# Patient Record
Sex: MALE | Race: WHITE | Employment: OTHER | URBAN - METROPOLITAN AREA
[De-identification: names, ages, dates, MRNs, and addresses within clinical notes are randomized per-mention and may not be internally consistent; named-entity substitution may affect disease eponyms.]

---

## 2017-01-23 ENCOUNTER — OFFICE VISIT (OUTPATIENT)
Dept: INTERNAL MEDICINE CLINIC | Age: 72
End: 2017-01-23

## 2017-01-23 VITALS
WEIGHT: 229 LBS | BODY MASS INDEX: 32.06 KG/M2 | SYSTOLIC BLOOD PRESSURE: 110 MMHG | HEART RATE: 70 BPM | RESPIRATION RATE: 18 BRPM | DIASTOLIC BLOOD PRESSURE: 75 MMHG | HEIGHT: 71 IN

## 2017-01-23 DIAGNOSIS — R07.89 OTHER CHEST PAIN: ICD-10-CM

## 2017-01-23 DIAGNOSIS — I10 ESSENTIAL HYPERTENSION: ICD-10-CM

## 2017-01-23 DIAGNOSIS — I25.10 CORONARY ARTERY DISEASE INVOLVING NATIVE CORONARY ARTERY OF NATIVE HEART WITHOUT ANGINA PECTORIS: ICD-10-CM

## 2017-01-23 DIAGNOSIS — E11.8 TYPE 2 DIABETES MELLITUS WITH COMPLICATION, WITHOUT LONG-TERM CURRENT USE OF INSULIN (HCC): Primary | ICD-10-CM

## 2017-01-23 DIAGNOSIS — E78.2 MIXED HYPERLIPIDEMIA: ICD-10-CM

## 2017-01-23 DIAGNOSIS — E11.8 TYPE 2 DIABETES MELLITUS WITH COMPLICATION, WITHOUT LONG-TERM CURRENT USE OF INSULIN (HCC): ICD-10-CM

## 2017-01-23 DIAGNOSIS — E11.29 TYPE 2 DIABETES MELLITUS WITH MICROALBUMINURIA, WITHOUT LONG-TERM CURRENT USE OF INSULIN (HCC): ICD-10-CM

## 2017-01-23 DIAGNOSIS — R80.9 TYPE 2 DIABETES MELLITUS WITH MICROALBUMINURIA, WITHOUT LONG-TERM CURRENT USE OF INSULIN (HCC): ICD-10-CM

## 2017-01-23 LAB
CHOLESTEROL, TOTAL: 161 MG/DL (ref 0–199)
HDLC SERPL-MCNC: 30 MG/DL (ref 40–60)
LDL CHOLESTEROL CALCULATED: 86 MG/DL
TRIGL SERPL-MCNC: 226 MG/DL (ref 0–150)
VLDLC SERPL CALC-MCNC: 45 MG/DL

## 2017-01-23 PROCEDURE — 99214 OFFICE O/P EST MOD 30 MIN: CPT | Performed by: INTERNAL MEDICINE

## 2017-01-23 PROCEDURE — 3017F COLORECTAL CA SCREEN DOC REV: CPT | Performed by: INTERNAL MEDICINE

## 2017-01-23 PROCEDURE — G8484 FLU IMMUNIZE NO ADMIN: HCPCS | Performed by: INTERNAL MEDICINE

## 2017-01-23 PROCEDURE — 1123F ACP DISCUSS/DSCN MKR DOCD: CPT | Performed by: INTERNAL MEDICINE

## 2017-01-23 PROCEDURE — G8598 ASA/ANTIPLAT THER USED: HCPCS | Performed by: INTERNAL MEDICINE

## 2017-01-23 PROCEDURE — 3045F PR MOST RECENT HEMOGLOBIN A1C LEVEL 7.0-9.0%: CPT | Performed by: INTERNAL MEDICINE

## 2017-01-23 PROCEDURE — G8419 CALC BMI OUT NRM PARAM NOF/U: HCPCS | Performed by: INTERNAL MEDICINE

## 2017-01-23 PROCEDURE — G8427 DOCREV CUR MEDS BY ELIG CLIN: HCPCS | Performed by: INTERNAL MEDICINE

## 2017-01-23 PROCEDURE — 4040F PNEUMOC VAC/ADMIN/RCVD: CPT | Performed by: INTERNAL MEDICINE

## 2017-01-23 PROCEDURE — 4004F PT TOBACCO SCREEN RCVD TLK: CPT | Performed by: INTERNAL MEDICINE

## 2017-01-23 RX ORDER — MELOXICAM 15 MG/1
15 TABLET ORAL DAILY
Qty: 90 TABLET | Refills: 0 | Status: SHIPPED | OUTPATIENT
Start: 2017-01-23 | End: 2017-08-15

## 2017-01-24 LAB
ESTIMATED AVERAGE GLUCOSE: 151.3 MG/DL
HBA1C MFR BLD: 6.9 %

## 2017-01-30 ENCOUNTER — TELEPHONE (OUTPATIENT)
Dept: INTERNAL MEDICINE CLINIC | Age: 72
End: 2017-01-30

## 2017-01-30 RX ORDER — DOXYCYCLINE HYCLATE 100 MG
100 TABLET ORAL 2 TIMES DAILY
Qty: 10 TABLET | Refills: 0 | Status: SHIPPED | OUTPATIENT
Start: 2017-01-30 | End: 2017-02-14 | Stop reason: ALTCHOICE

## 2017-02-04 PROBLEM — I50.9 ACUTE CONGESTIVE HEART FAILURE (HCC): Status: ACTIVE | Noted: 2017-02-04

## 2017-02-05 PROBLEM — I71.40 ABDOMINAL AORTIC ANEURYSM (AAA) WITHOUT RUPTURE: Status: ACTIVE | Noted: 2017-02-05

## 2017-02-07 ENCOUNTER — CARE COORDINATION (OUTPATIENT)
Dept: CARE COORDINATION | Age: 72
End: 2017-02-07

## 2017-02-07 ENCOUNTER — TELEPHONE (OUTPATIENT)
Dept: INTERNAL MEDICINE CLINIC | Age: 72
End: 2017-02-07

## 2017-02-14 ENCOUNTER — HOSPITAL ENCOUNTER (OUTPATIENT)
Dept: NUCLEAR MEDICINE | Age: 72
Discharge: HOME | End: 2017-02-14
Attending: INTERNAL MEDICINE | Admitting: INTERNAL MEDICINE

## 2017-02-14 ENCOUNTER — TELEPHONE (OUTPATIENT)
Dept: INTERNAL MEDICINE CLINIC | Age: 72
End: 2017-02-14

## 2017-02-14 VITALS
HEART RATE: 71 BPM | RESPIRATION RATE: 18 BRPM | DIASTOLIC BLOOD PRESSURE: 89 MMHG | TEMPERATURE: 98.1 F | SYSTOLIC BLOOD PRESSURE: 167 MMHG

## 2017-02-14 DIAGNOSIS — R07.89 OTHER CHEST PAIN: ICD-10-CM

## 2017-02-14 DIAGNOSIS — I10 ESSENTIAL HYPERTENSION: Primary | ICD-10-CM

## 2017-02-14 LAB
LV EF: 30 %
LVEF MODALITY: NORMAL

## 2017-02-14 RX ORDER — FUROSEMIDE 40 MG/1
40 TABLET ORAL DAILY
Qty: 30 TABLET | Refills: 0 | Status: SHIPPED | OUTPATIENT
Start: 2017-02-14 | End: 2017-03-11 | Stop reason: SDUPTHER

## 2017-02-14 RX ORDER — POTASSIUM CHLORIDE 750 MG/1
10 TABLET, FILM COATED, EXTENDED RELEASE ORAL DAILY
Qty: 30 TABLET | Refills: 0 | Status: SHIPPED | OUTPATIENT
Start: 2017-02-14 | End: 2017-03-13 | Stop reason: SDUPTHER

## 2017-02-14 ASSESSMENT — PAIN DESCRIPTION - DESCRIPTORS: DESCRIPTORS: DISCOMFORT

## 2017-02-14 ASSESSMENT — PAIN - FUNCTIONAL ASSESSMENT: PAIN_FUNCTIONAL_ASSESSMENT: 0-10

## 2017-02-21 ENCOUNTER — HOSPITAL ENCOUNTER (OUTPATIENT)
Dept: OTHER | Age: 72
Discharge: HOME | End: 2017-02-21
Attending: INTERNAL MEDICINE | Admitting: INTERNAL MEDICINE

## 2017-02-21 DIAGNOSIS — N40.0 ENLARGED PROSTATE: Primary | ICD-10-CM

## 2017-02-21 LAB
ANION GAP SERPL CALCULATED.3IONS-SCNC: 15 MMOL/L (ref 3–16)
BUN BLDV-MCNC: 10 MG/DL (ref 7–20)
CALCIUM SERPL-MCNC: 9.3 MG/DL (ref 8.3–10.6)
CHLORIDE BLD-SCNC: 97 MMOL/L (ref 99–110)
CO2: 29 MMOL/L (ref 21–32)
CREAT SERPL-MCNC: 0.9 MG/DL (ref 0.8–1.3)
GFR AFRICAN AMERICAN: >60
GFR NON-AFRICAN AMERICAN: >60
GLUCOSE BLD-MCNC: 145 MG/DL (ref 70–99)
POTASSIUM SERPL-SCNC: 4 MMOL/L (ref 3.5–5.1)
SODIUM BLD-SCNC: 141 MMOL/L (ref 136–145)

## 2017-02-27 ENCOUNTER — TELEPHONE (OUTPATIENT)
Dept: INTERNAL MEDICINE CLINIC | Age: 72
End: 2017-02-27

## 2017-02-28 ENCOUNTER — TELEPHONE (OUTPATIENT)
Dept: INTERNAL MEDICINE CLINIC | Age: 72
End: 2017-02-28

## 2017-03-13 RX ORDER — FUROSEMIDE 40 MG/1
TABLET ORAL
Qty: 30 TABLET | Refills: 0 | Status: SHIPPED | OUTPATIENT
Start: 2017-03-13 | End: 2017-04-11 | Stop reason: SDUPTHER

## 2017-03-13 RX ORDER — POTASSIUM CHLORIDE 750 MG/1
TABLET, FILM COATED, EXTENDED RELEASE ORAL
Qty: 30 TABLET | Refills: 0 | Status: SHIPPED | OUTPATIENT
Start: 2017-03-13 | End: 2017-03-31 | Stop reason: SDUPTHER

## 2017-03-17 RX ORDER — ZOLPIDEM TARTRATE 10 MG/1
TABLET ORAL
Qty: 90 TABLET | Refills: 0 | Status: SHIPPED | OUTPATIENT
Start: 2017-03-17 | End: 2017-06-12 | Stop reason: SDUPTHER

## 2017-03-31 RX ORDER — POTASSIUM CHLORIDE 750 MG/1
TABLET, FILM COATED, EXTENDED RELEASE ORAL
Qty: 30 TABLET | Refills: 0 | Status: SHIPPED | OUTPATIENT
Start: 2017-03-31 | End: 2017-05-13

## 2017-04-03 RX ORDER — METOPROLOL SUCCINATE 50 MG/1
TABLET, EXTENDED RELEASE ORAL
Qty: 180 TABLET | Refills: 0 | Status: SHIPPED | OUTPATIENT
Start: 2017-04-03 | End: 2017-07-03 | Stop reason: SDUPTHER

## 2017-04-12 RX ORDER — POTASSIUM CHLORIDE 750 MG/1
TABLET, FILM COATED, EXTENDED RELEASE ORAL
Qty: 30 TABLET | Refills: 0 | Status: SHIPPED | OUTPATIENT
Start: 2017-04-12 | End: 2017-05-13 | Stop reason: SDUPTHER

## 2017-04-12 RX ORDER — FUROSEMIDE 40 MG/1
TABLET ORAL
Qty: 30 TABLET | Refills: 0 | Status: SHIPPED | OUTPATIENT
Start: 2017-04-12 | End: 2017-05-13 | Stop reason: SDUPTHER

## 2017-05-10 ENCOUNTER — HOSPITAL ENCOUNTER (OUTPATIENT)
Dept: OTHER | Age: 72
Discharge: HOME | End: 2017-05-10
Attending: INTERNAL MEDICINE | Admitting: INTERNAL MEDICINE

## 2017-05-10 ENCOUNTER — OFFICE VISIT (OUTPATIENT)
Dept: INTERNAL MEDICINE CLINIC | Age: 72
End: 2017-05-10

## 2017-05-10 VITALS
SYSTOLIC BLOOD PRESSURE: 140 MMHG | RESPIRATION RATE: 18 BRPM | DIASTOLIC BLOOD PRESSURE: 75 MMHG | WEIGHT: 228 LBS | BODY MASS INDEX: 31.92 KG/M2 | HEIGHT: 71 IN | HEART RATE: 75 BPM

## 2017-05-10 DIAGNOSIS — R30.0 DYSURIA: Primary | ICD-10-CM

## 2017-05-10 DIAGNOSIS — I42.9 CARDIOMYOPATHY (HCC): ICD-10-CM

## 2017-05-10 DIAGNOSIS — R10.9 LEFT FLANK PAIN: ICD-10-CM

## 2017-05-10 DIAGNOSIS — R30.0 DYSURIA: ICD-10-CM

## 2017-05-10 DIAGNOSIS — I50.22 CHF NYHA CLASS II, CHRONIC, SYSTOLIC (HCC): ICD-10-CM

## 2017-05-10 LAB
BILIRUBIN, POC: NORMAL
BLOOD URINE, POC: NORMAL
CLARITY, POC: NORMAL
COLOR, POC: NORMAL
GLUCOSE URINE, POC: NORMAL
KETONES, POC: NORMAL
LEUKOCYTE EST, POC: NORMAL
NITRITE, POC: NORMAL
PH, POC: NORMAL
PROTEIN, POC: NORMAL
SPECIFIC GRAVITY, POC: NORMAL
UROBILINOGEN, POC: NORMAL

## 2017-05-10 PROCEDURE — G8417 CALC BMI ABV UP PARAM F/U: HCPCS | Performed by: INTERNAL MEDICINE

## 2017-05-10 PROCEDURE — 4004F PT TOBACCO SCREEN RCVD TLK: CPT | Performed by: INTERNAL MEDICINE

## 2017-05-10 PROCEDURE — 1123F ACP DISCUSS/DSCN MKR DOCD: CPT | Performed by: INTERNAL MEDICINE

## 2017-05-10 PROCEDURE — 99213 OFFICE O/P EST LOW 20 MIN: CPT | Performed by: INTERNAL MEDICINE

## 2017-05-10 PROCEDURE — G8598 ASA/ANTIPLAT THER USED: HCPCS | Performed by: INTERNAL MEDICINE

## 2017-05-10 PROCEDURE — 81002 URINALYSIS NONAUTO W/O SCOPE: CPT | Performed by: INTERNAL MEDICINE

## 2017-05-10 PROCEDURE — 4040F PNEUMOC VAC/ADMIN/RCVD: CPT | Performed by: INTERNAL MEDICINE

## 2017-05-10 PROCEDURE — G8427 DOCREV CUR MEDS BY ELIG CLIN: HCPCS | Performed by: INTERNAL MEDICINE

## 2017-05-10 PROCEDURE — 3017F COLORECTAL CA SCREEN DOC REV: CPT | Performed by: INTERNAL MEDICINE

## 2017-05-10 RX ORDER — VALACYCLOVIR HYDROCHLORIDE 500 MG/1
500 TABLET, FILM COATED ORAL 2 TIMES DAILY
Qty: 10 TABLET | Refills: 0 | Status: SHIPPED | OUTPATIENT
Start: 2017-05-10 | End: 2017-05-15

## 2017-05-10 RX ORDER — HYDROCODONE BITARTRATE AND ACETAMINOPHEN 5; 325 MG/1; MG/1
1 TABLET ORAL EVERY 6 HOURS PRN
Qty: 15 TABLET | Refills: 0 | Status: SHIPPED | OUTPATIENT
Start: 2017-05-10 | End: 2017-05-15 | Stop reason: SDUPTHER

## 2017-05-10 RX ORDER — CYCLOBENZAPRINE HCL 5 MG
5 TABLET ORAL EVERY 8 HOURS PRN
Qty: 30 TABLET | Refills: 0 | Status: SHIPPED | OUTPATIENT
Start: 2017-05-10 | End: 2017-05-24 | Stop reason: SDUPTHER

## 2017-05-12 LAB — URINE CULTURE, ROUTINE: NORMAL

## 2017-05-13 RX ORDER — POTASSIUM CHLORIDE 750 MG/1
10 TABLET, FILM COATED, EXTENDED RELEASE ORAL DAILY
Qty: 30 TABLET | Refills: 0 | Status: SHIPPED | OUTPATIENT
Start: 2017-05-13 | End: 2017-08-15 | Stop reason: SDUPTHER

## 2017-05-13 RX ORDER — FUROSEMIDE 40 MG/1
40 TABLET ORAL DAILY
Qty: 30 TABLET | Refills: 0 | Status: SHIPPED | OUTPATIENT
Start: 2017-05-13 | End: 2017-06-08 | Stop reason: SDUPTHER

## 2017-05-15 RX ORDER — HYDROCODONE BITARTRATE AND ACETAMINOPHEN 5; 325 MG/1; MG/1
1 TABLET ORAL EVERY 6 HOURS PRN
Qty: 15 TABLET | Refills: 0 | Status: SHIPPED | OUTPATIENT
Start: 2017-05-15 | End: 2018-06-21 | Stop reason: SDUPTHER

## 2017-05-16 DIAGNOSIS — N20.0 KIDNEY STONE: Primary | ICD-10-CM

## 2017-05-16 RX ORDER — OXYCODONE HYDROCHLORIDE AND ACETAMINOPHEN 5; 325 MG/1; MG/1
1 TABLET ORAL EVERY 6 HOURS PRN
Qty: 25 TABLET | Refills: 0 | Status: SHIPPED | OUTPATIENT
Start: 2017-05-16 | End: 2017-05-25 | Stop reason: SDUPTHER

## 2017-05-16 RX ORDER — CITALOPRAM 20 MG/1
TABLET ORAL
Qty: 90 TABLET | Refills: 0 | Status: SHIPPED | OUTPATIENT
Start: 2017-05-16 | End: 2017-08-15 | Stop reason: SDUPTHER

## 2017-05-17 ENCOUNTER — HOSPITAL ENCOUNTER (OUTPATIENT)
Dept: NON INVASIVE DIAGNOSTICS | Age: 72
Discharge: HOME | End: 2017-05-17
Attending: INTERNAL MEDICINE | Admitting: INTERNAL MEDICINE

## 2017-05-17 DIAGNOSIS — I42.9 CARDIOMYOPATHY (HCC): ICD-10-CM

## 2017-05-17 LAB
LV EF: 43 %
LVEF MODALITY: NORMAL

## 2017-05-18 ENCOUNTER — HOSPITAL ENCOUNTER (OUTPATIENT)
Dept: CT IMAGING | Age: 72
Discharge: HOME | End: 2017-05-18

## 2017-05-18 DIAGNOSIS — N20.0 KIDNEY STONE: ICD-10-CM

## 2017-05-19 RX ORDER — AMOXICILLIN 250 MG
1 CAPSULE ORAL 2 TIMES DAILY
Qty: 30 TABLET | Refills: 0 | Status: SHIPPED | OUTPATIENT
Start: 2017-05-19 | End: 2017-08-15

## 2017-05-22 ENCOUNTER — TELEPHONE (OUTPATIENT)
Dept: CARDIOLOGY CLINIC | Age: 72
End: 2017-05-22

## 2017-05-24 ENCOUNTER — OFFICE VISIT (OUTPATIENT)
Dept: CARDIOLOGY CLINIC | Age: 72
End: 2017-05-24

## 2017-05-24 VITALS
DIASTOLIC BLOOD PRESSURE: 72 MMHG | HEIGHT: 70 IN | BODY MASS INDEX: 32.64 KG/M2 | SYSTOLIC BLOOD PRESSURE: 126 MMHG | HEART RATE: 80 BPM | WEIGHT: 228 LBS | OXYGEN SATURATION: 96 %

## 2017-05-24 DIAGNOSIS — I25.10 CORONARY ARTERY DISEASE INVOLVING NATIVE CORONARY ARTERY OF NATIVE HEART WITHOUT ANGINA PECTORIS: Primary | ICD-10-CM

## 2017-05-24 DIAGNOSIS — E78.2 MIXED HYPERLIPIDEMIA: ICD-10-CM

## 2017-05-24 PROCEDURE — 99214 OFFICE O/P EST MOD 30 MIN: CPT | Performed by: INTERNAL MEDICINE

## 2017-05-24 PROCEDURE — G8417 CALC BMI ABV UP PARAM F/U: HCPCS | Performed by: INTERNAL MEDICINE

## 2017-05-24 PROCEDURE — G8427 DOCREV CUR MEDS BY ELIG CLIN: HCPCS | Performed by: INTERNAL MEDICINE

## 2017-05-24 PROCEDURE — 4040F PNEUMOC VAC/ADMIN/RCVD: CPT | Performed by: INTERNAL MEDICINE

## 2017-05-24 PROCEDURE — G8598 ASA/ANTIPLAT THER USED: HCPCS | Performed by: INTERNAL MEDICINE

## 2017-05-24 PROCEDURE — 4004F PT TOBACCO SCREEN RCVD TLK: CPT | Performed by: INTERNAL MEDICINE

## 2017-05-24 PROCEDURE — 1123F ACP DISCUSS/DSCN MKR DOCD: CPT | Performed by: INTERNAL MEDICINE

## 2017-05-24 PROCEDURE — 3017F COLORECTAL CA SCREEN DOC REV: CPT | Performed by: INTERNAL MEDICINE

## 2017-05-25 RX ORDER — OXYCODONE HYDROCHLORIDE AND ACETAMINOPHEN 5; 325 MG/1; MG/1
1 TABLET ORAL EVERY 6 HOURS PRN
Qty: 25 TABLET | Refills: 0 | Status: SHIPPED | OUTPATIENT
Start: 2017-05-25 | End: 2017-08-15

## 2017-06-08 RX ORDER — GLIMEPIRIDE 4 MG/1
TABLET ORAL
Qty: 90 TABLET | Refills: 0 | Status: SHIPPED | OUTPATIENT
Start: 2017-06-08 | End: 2017-09-08 | Stop reason: SDUPTHER

## 2017-06-08 RX ORDER — CYCLOBENZAPRINE HCL 5 MG
TABLET ORAL
Qty: 30 TABLET | Refills: 0 | Status: SHIPPED | OUTPATIENT
Start: 2017-06-08 | End: 2017-08-15

## 2017-06-13 RX ORDER — OMEPRAZOLE 20 MG/1
CAPSULE, DELAYED RELEASE ORAL
Qty: 90 CAPSULE | Refills: 0 | Status: SHIPPED | OUTPATIENT
Start: 2017-06-13 | End: 2017-09-08 | Stop reason: SDUPTHER

## 2017-06-13 RX ORDER — ZOLPIDEM TARTRATE 10 MG/1
TABLET ORAL
Qty: 90 TABLET | Refills: 0 | Status: SHIPPED | OUTPATIENT
Start: 2017-06-13 | End: 2017-09-01 | Stop reason: SDUPTHER

## 2017-06-26 RX ORDER — LISINOPRIL 40 MG/1
TABLET ORAL
Qty: 90 TABLET | Refills: 0 | Status: SHIPPED | OUTPATIENT
Start: 2017-06-26 | End: 2017-09-25 | Stop reason: SDUPTHER

## 2017-07-03 RX ORDER — PRAVASTATIN SODIUM 80 MG/1
TABLET ORAL
Qty: 90 TABLET | Refills: 0 | Status: SHIPPED | OUTPATIENT
Start: 2017-07-03 | End: 2017-10-03 | Stop reason: SDUPTHER

## 2017-07-03 RX ORDER — METOPROLOL SUCCINATE 50 MG/1
TABLET, EXTENDED RELEASE ORAL
Qty: 180 TABLET | Refills: 0 | Status: SHIPPED | OUTPATIENT
Start: 2017-07-03 | End: 2017-10-03 | Stop reason: SDUPTHER

## 2017-07-10 RX ORDER — POTASSIUM CHLORIDE 750 MG/1
TABLET, FILM COATED, EXTENDED RELEASE ORAL
Qty: 30 TABLET | Refills: 0 | Status: SHIPPED | OUTPATIENT
Start: 2017-07-10 | End: 2017-08-10 | Stop reason: SDUPTHER

## 2017-07-13 ENCOUNTER — TELEPHONE (OUTPATIENT)
Dept: INTERNAL MEDICINE CLINIC | Age: 72
End: 2017-07-13

## 2017-08-14 RX ORDER — POTASSIUM CHLORIDE 750 MG/1
TABLET, FILM COATED, EXTENDED RELEASE ORAL
Qty: 30 TABLET | Refills: 0 | Status: SHIPPED | OUTPATIENT
Start: 2017-08-14 | End: 2017-08-15 | Stop reason: SDUPTHER

## 2017-08-15 ENCOUNTER — OFFICE VISIT (OUTPATIENT)
Dept: INTERNAL MEDICINE CLINIC | Age: 72
End: 2017-08-15

## 2017-08-15 VITALS
SYSTOLIC BLOOD PRESSURE: 110 MMHG | RESPIRATION RATE: 18 BRPM | BODY MASS INDEX: 31.22 KG/M2 | HEIGHT: 71 IN | DIASTOLIC BLOOD PRESSURE: 75 MMHG | WEIGHT: 223 LBS | HEART RATE: 70 BPM

## 2017-08-15 DIAGNOSIS — E11.8 TYPE 2 DIABETES MELLITUS WITH COMPLICATION, WITHOUT LONG-TERM CURRENT USE OF INSULIN (HCC): ICD-10-CM

## 2017-08-15 DIAGNOSIS — N42.9 PROSTATE DISEASE: ICD-10-CM

## 2017-08-15 DIAGNOSIS — I10 ESSENTIAL HYPERTENSION: ICD-10-CM

## 2017-08-15 DIAGNOSIS — I71.40 ABDOMINAL AORTIC ANEURYSM (AAA) WITHOUT RUPTURE: ICD-10-CM

## 2017-08-15 DIAGNOSIS — I25.5 ISCHEMIC CARDIOMYOPATHY: ICD-10-CM

## 2017-08-15 DIAGNOSIS — Z12.11 COLON CANCER SCREENING: ICD-10-CM

## 2017-08-15 DIAGNOSIS — E78.2 MIXED HYPERLIPIDEMIA: ICD-10-CM

## 2017-08-15 DIAGNOSIS — I25.10 CORONARY ARTERY DISEASE INVOLVING NATIVE CORONARY ARTERY OF NATIVE HEART WITHOUT ANGINA PECTORIS: Primary | ICD-10-CM

## 2017-08-15 LAB
A/G RATIO: 2.5 (ref 1.1–2.2)
ALBUMIN SERPL-MCNC: 4.7 G/DL (ref 3.4–5)
ALP BLD-CCNC: 97 U/L (ref 40–129)
ALT SERPL-CCNC: 12 U/L (ref 10–40)
ANION GAP SERPL CALCULATED.3IONS-SCNC: 16 MMOL/L (ref 3–16)
AST SERPL-CCNC: 17 U/L (ref 15–37)
BASOPHILS ABSOLUTE: 0 K/UL (ref 0–0.2)
BASOPHILS RELATIVE PERCENT: 0.5 %
BILIRUB SERPL-MCNC: 1.1 MG/DL (ref 0–1)
BILIRUBIN, POC: NORMAL
BLOOD URINE, POC: NORMAL
BUN BLDV-MCNC: 10 MG/DL (ref 7–20)
CALCIUM SERPL-MCNC: 9.8 MG/DL (ref 8.3–10.6)
CHLORIDE BLD-SCNC: 97 MMOL/L (ref 99–110)
CHOLESTEROL, TOTAL: 157 MG/DL (ref 0–199)
CLARITY, POC: NORMAL
CO2: 24 MMOL/L (ref 21–32)
COLOR, POC: NORMAL
CREAT SERPL-MCNC: 0.8 MG/DL (ref 0.8–1.3)
EOSINOPHILS ABSOLUTE: 0.1 K/UL (ref 0–0.6)
EOSINOPHILS RELATIVE PERCENT: 1 %
GFR AFRICAN AMERICAN: >60
GFR NON-AFRICAN AMERICAN: >60
GLOBULIN: 1.9 G/DL
GLUCOSE BLD-MCNC: 148 MG/DL (ref 70–99)
GLUCOSE URINE, POC: NORMAL
HCT VFR BLD CALC: 43.9 % (ref 40.5–52.5)
HDLC SERPL-MCNC: 28 MG/DL (ref 40–60)
HEMOGLOBIN: 14.5 G/DL (ref 13.5–17.5)
KETONES, POC: NORMAL
LDL CHOLESTEROL CALCULATED: 81 MG/DL
LEUKOCYTE EST, POC: NORMAL
LYMPHOCYTES ABSOLUTE: 1.6 K/UL (ref 1–5.1)
LYMPHOCYTES RELATIVE PERCENT: 19.8 %
MCH RBC QN AUTO: 29.5 PG (ref 26–34)
MCHC RBC AUTO-ENTMCNC: 33 G/DL (ref 31–36)
MCV RBC AUTO: 89.5 FL (ref 80–100)
MONOCYTES ABSOLUTE: 0.5 K/UL (ref 0–1.3)
MONOCYTES RELATIVE PERCENT: 6.3 %
NEUTROPHILS ABSOLUTE: 5.8 K/UL (ref 1.7–7.7)
NEUTROPHILS RELATIVE PERCENT: 72.4 %
NITRITE, POC: NORMAL
PDW BLD-RTO: 14.7 % (ref 12.4–15.4)
PH, POC: NORMAL
PLATELET # BLD: 249 K/UL (ref 135–450)
PMV BLD AUTO: 8.2 FL (ref 5–10.5)
POTASSIUM SERPL-SCNC: 4.4 MMOL/L (ref 3.5–5.1)
PROSTATE SPECIFIC ANTIGEN: 0.65 NG/ML (ref 0–4)
PROTEIN, POC: NORMAL
RBC # BLD: 4.9 M/UL (ref 4.2–5.9)
SODIUM BLD-SCNC: 137 MMOL/L (ref 136–145)
SPECIFIC GRAVITY, POC: NORMAL
TOTAL PROTEIN: 6.6 G/DL (ref 6.4–8.2)
TRIGL SERPL-MCNC: 241 MG/DL (ref 0–150)
UROBILINOGEN, POC: NORMAL
VLDLC SERPL CALC-MCNC: 48 MG/DL
WBC # BLD: 7.9 K/UL (ref 4–11)

## 2017-08-15 PROCEDURE — 4004F PT TOBACCO SCREEN RCVD TLK: CPT | Performed by: INTERNAL MEDICINE

## 2017-08-15 PROCEDURE — 3017F COLORECTAL CA SCREEN DOC REV: CPT | Performed by: INTERNAL MEDICINE

## 2017-08-15 PROCEDURE — 1123F ACP DISCUSS/DSCN MKR DOCD: CPT | Performed by: INTERNAL MEDICINE

## 2017-08-15 PROCEDURE — 4040F PNEUMOC VAC/ADMIN/RCVD: CPT | Performed by: INTERNAL MEDICINE

## 2017-08-15 PROCEDURE — G8598 ASA/ANTIPLAT THER USED: HCPCS | Performed by: INTERNAL MEDICINE

## 2017-08-15 PROCEDURE — 99214 OFFICE O/P EST MOD 30 MIN: CPT | Performed by: INTERNAL MEDICINE

## 2017-08-15 PROCEDURE — G8417 CALC BMI ABV UP PARAM F/U: HCPCS | Performed by: INTERNAL MEDICINE

## 2017-08-15 PROCEDURE — 3045F PR MOST RECENT HEMOGLOBIN A1C LEVEL 7.0-9.0%: CPT | Performed by: INTERNAL MEDICINE

## 2017-08-15 PROCEDURE — 81002 URINALYSIS NONAUTO W/O SCOPE: CPT | Performed by: INTERNAL MEDICINE

## 2017-08-15 PROCEDURE — G8428 CUR MEDS NOT DOCUMENT: HCPCS | Performed by: INTERNAL MEDICINE

## 2017-08-15 RX ORDER — POTASSIUM CHLORIDE 750 MG/1
10 TABLET, FILM COATED, EXTENDED RELEASE ORAL DAILY
Qty: 90 TABLET | Refills: 0 | Status: SHIPPED | OUTPATIENT
Start: 2017-08-15 | End: 2017-11-13 | Stop reason: SDUPTHER

## 2017-08-15 RX ORDER — CITALOPRAM 20 MG/1
20 TABLET ORAL DAILY
Qty: 90 TABLET | Refills: 0 | Status: SHIPPED | OUTPATIENT
Start: 2017-08-15 | End: 2017-11-13 | Stop reason: SDUPTHER

## 2017-08-15 ASSESSMENT — PATIENT HEALTH QUESTIONNAIRE - PHQ9
SUM OF ALL RESPONSES TO PHQ QUESTIONS 1-9: 0
2. FEELING DOWN, DEPRESSED OR HOPELESS: 0
1. LITTLE INTEREST OR PLEASURE IN DOING THINGS: 0
SUM OF ALL RESPONSES TO PHQ9 QUESTIONS 1 & 2: 0

## 2017-08-15 NOTE — PROGRESS NOTES
There is a large size severe fixed defect involving the apex, apical    segments and mid anteroseptum consistent with prior infarct.    - There is no inducible ischemia.    - Left ventricular cavity is dilated.    - Anteroseptum, apical segments and apex are akinetic.    - Left ventricular systolic function is severely reduced with ejection    fraction of 30 %. Assessment:      1. Coronary artery disease involving native coronary artery of native heart without angina pectoris     2. Abdominal aortic aneurysm (AAA) without rupture ([address]     3. Mixed hyperlipidemia     4. Essential hypertension     5. Type 2 diabetes mellitus with complication, without long-term current use of insulin (HCC)  Diabetic Foot Exam    COMPREHENSIVE METABOLIC PANEL    CBC Auto Differential    Hemoglobin A1c    POCT urinalysis dipstick    Microalbumin / Creatinine Urine Ratio    Lipid panel   6. Ischemic cardiomyopathy     7. Prostate disease  PSA PROSTATIC SPECIFIC ANTIGEN           Plan:       DM- 2- well controlled with metformin and amaryl. Dropped amaryl 4 mg in am, 2 mg at night  No low sugars further. Last A1c at 6.9  Checks once daily      HTN- well controlled. On metoprolol, lisinopril and on lasix     Hyperlipidemia- stable on statins- recent increase in statin dose noted. Need repeat testing    CAD with h.o stent - equivocal stress 2017 . Continue ASA, statins      Ischemic cardiomyopathy , systolic CHF -   Currently on lasix , metoprolol, ACEI      COPD- stable, should start back  using oxygen   Continue albuterol , continues to smoke . Advised to quit    Iron def anemia- resolved. off supplements.       Cut down smoking , still smoking 1 pack per day       [facility] - s/p excision     Make appt with ophthal  For eye exam

## 2017-08-16 LAB
CREATININE URINE: 177.9 MG/DL (ref 39–259)
ESTIMATED AVERAGE GLUCOSE: 168.6 MG/DL
HBA1C MFR BLD: 7.5 %
MICROALBUMIN UR-MCNC: 6.7 MG/DL
MICROALBUMIN/CREAT UR-RTO: 37.7 MG/G (ref 0–30)

## 2017-08-21 ENCOUNTER — NURSE ONLY (OUTPATIENT)
Dept: INTERNAL MEDICINE CLINIC | Age: 72
End: 2017-08-21

## 2017-08-21 DIAGNOSIS — Z12.11 SCREENING FOR COLON CANCER: Primary | ICD-10-CM

## 2017-08-21 DIAGNOSIS — Z12.11 COLON CANCER SCREENING: ICD-10-CM

## 2017-08-21 LAB
CONTROL: NORMAL
HEMOCCULT STL QL: NORMAL

## 2017-08-21 PROCEDURE — 82274 ASSAY TEST FOR BLOOD FECAL: CPT | Performed by: INTERNAL MEDICINE

## 2017-09-01 RX ORDER — ZOLPIDEM TARTRATE 10 MG/1
TABLET ORAL
Qty: 90 TABLET | Refills: 0 | Status: SHIPPED | OUTPATIENT
Start: 2017-09-01 | End: 2017-12-04 | Stop reason: SDUPTHER

## 2017-09-08 RX ORDER — OMEPRAZOLE 20 MG/1
CAPSULE, DELAYED RELEASE ORAL
Qty: 90 CAPSULE | Refills: 0 | Status: SHIPPED | OUTPATIENT
Start: 2017-09-08 | End: 2017-11-28 | Stop reason: SDUPTHER

## 2017-09-08 RX ORDER — GLIMEPIRIDE 4 MG/1
TABLET ORAL
Qty: 90 TABLET | Refills: 0 | Status: SHIPPED | OUTPATIENT
Start: 2017-09-08 | End: 2017-11-28 | Stop reason: SDUPTHER

## 2017-10-04 RX ORDER — METOPROLOL SUCCINATE 50 MG/1
TABLET, EXTENDED RELEASE ORAL
Qty: 180 TABLET | Refills: 0 | Status: SHIPPED | OUTPATIENT
Start: 2017-10-04 | End: 2017-11-28 | Stop reason: SDUPTHER

## 2017-10-04 RX ORDER — LISINOPRIL 40 MG/1
TABLET ORAL
Qty: 90 TABLET | Refills: 0 | Status: SHIPPED | OUTPATIENT
Start: 2017-10-04 | End: 2017-11-28 | Stop reason: SDUPTHER

## 2017-10-04 RX ORDER — FUROSEMIDE 40 MG/1
TABLET ORAL
Qty: 90 TABLET | Refills: 0 | Status: SHIPPED | OUTPATIENT
Start: 2017-10-04 | End: 2017-11-28 | Stop reason: SDUPTHER

## 2017-10-04 RX ORDER — PRAVASTATIN SODIUM 80 MG/1
TABLET ORAL
Qty: 90 TABLET | Refills: 0 | Status: SHIPPED | OUTPATIENT
Start: 2017-10-04 | End: 2017-11-28 | Stop reason: SDUPTHER

## 2017-10-24 ENCOUNTER — NURSE ONLY (OUTPATIENT)
Dept: INTERNAL MEDICINE CLINIC | Age: 72
End: 2017-10-24

## 2017-10-24 DIAGNOSIS — Z23 NEED FOR INFLUENZA VACCINATION: Primary | ICD-10-CM

## 2017-10-24 PROCEDURE — G0008 ADMIN INFLUENZA VIRUS VAC: HCPCS | Performed by: INTERNAL MEDICINE

## 2017-10-24 PROCEDURE — 90662 IIV NO PRSV INCREASED AG IM: CPT | Performed by: INTERNAL MEDICINE

## 2017-11-13 RX ORDER — POTASSIUM CHLORIDE 750 MG/1
10 TABLET, FILM COATED, EXTENDED RELEASE ORAL DAILY
Qty: 90 TABLET | Refills: 0 | Status: SHIPPED | OUTPATIENT
Start: 2017-11-13 | End: 2018-02-16 | Stop reason: SDUPTHER

## 2017-11-13 RX ORDER — CITALOPRAM 20 MG/1
20 TABLET ORAL DAILY
Qty: 90 TABLET | Refills: 0 | Status: SHIPPED | OUTPATIENT
Start: 2017-11-13 | End: 2018-02-14 | Stop reason: SDUPTHER

## 2017-11-15 ENCOUNTER — TELEPHONE (OUTPATIENT)
Dept: INTERNAL MEDICINE CLINIC | Age: 72
End: 2017-11-15

## 2017-11-15 RX ORDER — CLOTRIMAZOLE AND BETAMETHASONE DIPROPIONATE 10; .64 MG/G; MG/G
CREAM TOPICAL
Qty: 45 G | Refills: 0 | Status: SHIPPED | OUTPATIENT
Start: 2017-11-15 | End: 2019-08-13

## 2017-11-28 ENCOUNTER — OFFICE VISIT (OUTPATIENT)
Dept: INTERNAL MEDICINE CLINIC | Age: 72
End: 2017-11-28

## 2017-11-28 VITALS
HEIGHT: 71 IN | SYSTOLIC BLOOD PRESSURE: 125 MMHG | RESPIRATION RATE: 18 BRPM | BODY MASS INDEX: 30.52 KG/M2 | HEART RATE: 70 BPM | WEIGHT: 218 LBS | DIASTOLIC BLOOD PRESSURE: 75 MMHG

## 2017-11-28 DIAGNOSIS — I25.10 CORONARY ARTERY DISEASE INVOLVING NATIVE CORONARY ARTERY OF NATIVE HEART WITHOUT ANGINA PECTORIS: ICD-10-CM

## 2017-11-28 DIAGNOSIS — E11.8 TYPE 2 DIABETES MELLITUS WITH COMPLICATION, WITHOUT LONG-TERM CURRENT USE OF INSULIN (HCC): Primary | ICD-10-CM

## 2017-11-28 DIAGNOSIS — I50.22 CHF (CONGESTIVE HEART FAILURE), NYHA CLASS II, CHRONIC, SYSTOLIC (HCC): ICD-10-CM

## 2017-11-28 DIAGNOSIS — Z11.59 ENCOUNTER FOR HEPATITIS C SCREENING TEST FOR LOW RISK PATIENT: ICD-10-CM

## 2017-11-28 DIAGNOSIS — I10 ESSENTIAL HYPERTENSION: ICD-10-CM

## 2017-11-28 DIAGNOSIS — E78.2 MIXED HYPERLIPIDEMIA: ICD-10-CM

## 2017-11-28 PROBLEM — I50.9 ACUTE CONGESTIVE HEART FAILURE (HCC): Status: RESOLVED | Noted: 2017-02-04 | Resolved: 2017-11-28

## 2017-11-28 PROCEDURE — 4004F PT TOBACCO SCREEN RCVD TLK: CPT | Performed by: INTERNAL MEDICINE

## 2017-11-28 PROCEDURE — 4040F PNEUMOC VAC/ADMIN/RCVD: CPT | Performed by: INTERNAL MEDICINE

## 2017-11-28 PROCEDURE — 1123F ACP DISCUSS/DSCN MKR DOCD: CPT | Performed by: INTERNAL MEDICINE

## 2017-11-28 PROCEDURE — 3045F PR MOST RECENT HEMOGLOBIN A1C LEVEL 7.0-9.0%: CPT | Performed by: INTERNAL MEDICINE

## 2017-11-28 PROCEDURE — G8598 ASA/ANTIPLAT THER USED: HCPCS | Performed by: INTERNAL MEDICINE

## 2017-11-28 PROCEDURE — 3017F COLORECTAL CA SCREEN DOC REV: CPT | Performed by: INTERNAL MEDICINE

## 2017-11-28 PROCEDURE — G8417 CALC BMI ABV UP PARAM F/U: HCPCS | Performed by: INTERNAL MEDICINE

## 2017-11-28 PROCEDURE — G8427 DOCREV CUR MEDS BY ELIG CLIN: HCPCS | Performed by: INTERNAL MEDICINE

## 2017-11-28 PROCEDURE — G8484 FLU IMMUNIZE NO ADMIN: HCPCS | Performed by: INTERNAL MEDICINE

## 2017-11-28 PROCEDURE — 99214 OFFICE O/P EST MOD 30 MIN: CPT | Performed by: INTERNAL MEDICINE

## 2017-11-28 RX ORDER — GLIMEPIRIDE 4 MG/1
4 TABLET ORAL
Qty: 90 TABLET | Refills: 0 | Status: SHIPPED | OUTPATIENT
Start: 2017-11-28 | End: 2018-03-12 | Stop reason: SDUPTHER

## 2017-11-28 RX ORDER — PRAVASTATIN SODIUM 80 MG/1
80 TABLET ORAL DAILY
Qty: 90 TABLET | Refills: 0 | Status: SHIPPED | OUTPATIENT
Start: 2017-11-28 | End: 2018-03-15 | Stop reason: SDUPTHER

## 2017-11-28 RX ORDER — OMEPRAZOLE 20 MG/1
20 CAPSULE, DELAYED RELEASE ORAL DAILY
Qty: 90 CAPSULE | Refills: 0 | Status: SHIPPED | OUTPATIENT
Start: 2017-11-28 | End: 2018-02-16 | Stop reason: SDUPTHER

## 2017-11-28 RX ORDER — METOPROLOL SUCCINATE 50 MG/1
50 TABLET, EXTENDED RELEASE ORAL DAILY
Qty: 90 TABLET | Refills: 0 | Status: SHIPPED | OUTPATIENT
Start: 2017-11-28 | End: 2018-03-12 | Stop reason: SDUPTHER

## 2017-11-28 RX ORDER — FUROSEMIDE 40 MG/1
40 TABLET ORAL DAILY
Qty: 90 TABLET | Refills: 0 | Status: SHIPPED | OUTPATIENT
Start: 2017-11-28 | End: 2018-03-15 | Stop reason: SDUPTHER

## 2017-11-28 RX ORDER — PREDNISONE 10 MG/1
20 TABLET ORAL DAILY
Qty: 20 TABLET | Refills: 0 | Status: SHIPPED | OUTPATIENT
Start: 2017-11-28 | End: 2017-12-08

## 2017-11-28 RX ORDER — LISINOPRIL 40 MG/1
40 TABLET ORAL DAILY
Qty: 90 TABLET | Refills: 0 | Status: SHIPPED | OUTPATIENT
Start: 2017-11-28 | End: 2018-03-15 | Stop reason: SDUPTHER

## 2017-11-28 NOTE — PROGRESS NOTES
Subjective:      Patient ID: Vana Councilman is a 81 y.o. male. HPI     81 y.o.  male with known history of DM Type 2, OA, CAD, htn, hyperlipidemia, CHF,  depression, COPD  Here for regular f/w    DM - Doing fairly well. Sugars stable , no more than 140's. Compliant with diet and meds mostly . No  low sugars noted     COPD - stable Continues to smoke . Uses Inhaler occasionally . CAD with no recent chest pain     CHF - stable on lasix and meds, seen Dr. Camilo Shah   Reports no symptoms , wt loss noted with lasix and feels better    Has low back pain     Nocturnal hypoxemia - unable to sleep at night , sleeps in  day time ( using oxygen at bed time). But wakes up frequently once every hr. Not using  ambien often     Reports palmar rash with skin breaking and itching. Used triamcilone with no benefit    gerd on ppi      Current Outpatient Prescriptions   Medication Sig Dispense Refill    predniSONE (DELTASONE) 10 MG tablet Take 2 tablets by mouth daily for 10 days 20 tablet 0    clotrimazole-betamethasone (LOTRISONE) 1-0.05 % cream Apply topically 2 times daily.  45 g 0    potassium chloride (KLOR-CON) 10 MEQ extended release tablet TAKE 1 TABLET BY MOUTH DAILY 90 tablet 0    citalopram (CELEXA) 20 MG tablet TAKE 1 TABLET BY MOUTH DAILY 90 tablet 0    lisinopril (PRINIVIL;ZESTRIL) 40 MG tablet TAKE ONE TABLET BY MOUTH DAILY 90 tablet 0    metoprolol succinate (TOPROL XL) 50 MG extended release tablet TAKE ONE TABLET BY MOUTH 2 TIMES DAILY 180 tablet 0    pravastatin (PRAVACHOL) 80 MG tablet TAKE ONE TABLET BY MOUTH DAILY 90 tablet 0    furosemide (LASIX) 40 MG tablet TAKE 1 TABLET BY MOUTH DAILY 90 tablet 0    omeprazole (PRILOSEC) 20 MG delayed release capsule TAKE ONE CAPSULE BY MOUTH DAILY 90 capsule 0    glimepiride (AMARYL) 4 MG tablet TAKE 1 TABLET BY MOUTH EACH MORNING BEFORE BREAKFAST 90 tablet 0    zolpidem (AMBIEN) 10 MG tablet TAKE ONE TABLET BY MOUTH AT BEDTIME AS NEEDED FOR SLEEP 90 tablet

## 2017-12-05 RX ORDER — ZOLPIDEM TARTRATE 10 MG/1
TABLET ORAL
Qty: 90 TABLET | Refills: 0 | Status: SHIPPED | OUTPATIENT
Start: 2017-12-05 | End: 2018-03-15 | Stop reason: SDUPTHER

## 2017-12-13 ENCOUNTER — TELEPHONE (OUTPATIENT)
Dept: INTERNAL MEDICINE CLINIC | Age: 72
End: 2017-12-13

## 2017-12-13 RX ORDER — PREDNISONE 20 MG/1
20 TABLET ORAL DAILY
Qty: 5 TABLET | Refills: 0 | Status: SHIPPED | OUTPATIENT
Start: 2017-12-13 | End: 2017-12-18

## 2017-12-14 ENCOUNTER — TELEPHONE (OUTPATIENT)
Dept: INTERNAL MEDICINE CLINIC | Age: 72
End: 2017-12-14

## 2017-12-15 ENCOUNTER — TELEPHONE (OUTPATIENT)
Dept: INTERNAL MEDICINE CLINIC | Age: 72
End: 2017-12-15

## 2017-12-26 ENCOUNTER — TELEPHONE (OUTPATIENT)
Dept: INTERNAL MEDICINE CLINIC | Age: 72
End: 2017-12-26

## 2017-12-26 NOTE — TELEPHONE ENCOUNTER
----- Message from Edu Garcia MD sent at 12/26/2017 10:04 AM EST -----  Contact: pt wife # [phone]  I sent  ----- Message -----  From: Aly Patrick  Sent: 12/26/2017   9:39 AM  To: Edu Garcia MD    Bid #120 g?  ----- Message -----  From: Edu Garcia MD  Sent: 12/26/2017   7:42 AM  To: Aly Patrick    Send prescription    ----- Message -----  From: Aly Patrick  Sent: 12/15/2017   9:21 AM  To: Edu Garcia MD    Out of eucresa samples. ----- Message -----  From: Edu Garcia MD  Sent: 12/14/2017  10:34 PM  To: Aly Patrick    Yes still see her    Give samples of eucresa    ----- Message -----  From: Aly Patrick  Sent: 12/14/2017  12:46 PM  To: Edu Garcia MD    Pt spouse states Dr Jen Esparza said it was eczema, and there wasn't a whole lot they could do for that except keep him comfortable. Wanted to know if you wanted pt to go back and see her?  ----- Message -----  From: Edu Garcia MD  Sent: 12/14/2017  12:21 PM  To: [address] [place] [address] to see Dr Antelmo Walters    ----- Message -----  From: Aly Patrick  Sent: 12/14/2017   9:20 AM  To: Edu Garcia MD    Pt's wife states pt has already seen a skin dr Tiwari Members it was dr Jie Trevizo  ----- Message -----  From: Nikki Ramirez  Sent: 12/13/2017   3:17 PM  To: Aly Patrick        ----- Message -----  From: Edu Garcia MD  Sent: 12/13/2017   3:15 PM  To: [address] [facility] to repeat prednsione 20 mg daily x 5 days  Would recommend skin MD    ----- Message -----  From: Jared Francois  Sent: 12/13/2017  10:10 AM  To: Edu Garcia MD    Pt wife called and said pt is still having issues with the rash on his hand.  Pt wife said it was gone while pt was taking predniSONE (DELTASONE) 10 MG tablet, wanted to know if you could call pt in another script for it since it is flaring up again    Last Visit: 11/28/17  Future Visit:-------    Pharmacy: [place], [address] [phone] - F [phone]

## 2017-12-27 ENCOUNTER — TELEPHONE (OUTPATIENT)
Dept: INTERNAL MEDICINE CLINIC | Age: 72
End: 2017-12-27

## 2017-12-27 RX ORDER — FLUOCINONIDE 0.5 MG/G
OINTMENT TOPICAL
Qty: 120 G | Refills: 1 | Status: SHIPPED | OUTPATIENT
Start: 2017-12-27 | End: 2018-01-03

## 2018-01-16 ENCOUNTER — TELEPHONE (OUTPATIENT)
Dept: INTERNAL MEDICINE CLINIC | Age: 73
End: 2018-01-16

## 2018-01-16 RX ORDER — DOXYCYCLINE HYCLATE 100 MG
100 TABLET ORAL 2 TIMES DAILY
Qty: 10 TABLET | Refills: 0 | Status: SHIPPED | OUTPATIENT
Start: 2018-01-16 | End: 2018-01-21

## 2018-01-16 NOTE — TELEPHONE ENCOUNTER
----- Message from Andrew Castro MD sent at 1/16/2018 12:28 PM EST -----  Contact: Pt Wife Erendira Gomez [phone] also [phone]  Doxy 100 bid x 5 days  Is he having any sob    ----- Message -----  From: Walter Valderrama  Sent: 1/16/2018  10:11 AM  To: Andrew Castro MD    Has been coughing for a week now with mucus that's yellowish, sweating, achy, gets chilly then hot. Wants to know if there is something that you can call in for him to take.    [address]    Last Visit:11/28/17  Future Visit: N/A

## 2018-01-19 ENCOUNTER — HOSPITAL ENCOUNTER (OUTPATIENT)
Dept: OTHER | Age: 73
Discharge: HOME | End: 2018-01-19
Attending: INTERNAL MEDICINE | Admitting: INTERNAL MEDICINE

## 2018-01-19 ENCOUNTER — TELEPHONE (OUTPATIENT)
Dept: INTERNAL MEDICINE CLINIC | Age: 73
End: 2018-01-19

## 2018-01-19 DIAGNOSIS — R05.9 COUGH: ICD-10-CM

## 2018-01-19 DIAGNOSIS — R05.9 COUGH: Primary | ICD-10-CM

## 2018-01-22 ENCOUNTER — TELEPHONE (OUTPATIENT)
Dept: INTERNAL MEDICINE CLINIC | Age: 73
End: 2018-01-22

## 2018-01-22 RX ORDER — HYDROXYZINE HYDROCHLORIDE 10 MG/1
10 TABLET, FILM COATED ORAL 3 TIMES DAILY PRN
Qty: 15 TABLET | Refills: 0 | Status: SHIPPED | OUTPATIENT
Start: 2018-01-22 | End: 2018-03-15 | Stop reason: ALTCHOICE

## 2018-01-22 RX ORDER — DOXYCYCLINE HYCLATE 100 MG
100 TABLET ORAL 2 TIMES DAILY
Qty: 10 TABLET | Refills: 0 | Status: SHIPPED | OUTPATIENT
Start: 2018-01-22 | End: 2018-01-27

## 2018-02-15 RX ORDER — CITALOPRAM 20 MG/1
20 TABLET ORAL DAILY
Qty: 90 TABLET | Refills: 0 | Status: SHIPPED | OUTPATIENT
Start: 2018-02-15 | End: 2018-03-15 | Stop reason: SDUPTHER

## 2018-02-16 RX ORDER — OMEPRAZOLE 20 MG/1
20 CAPSULE, DELAYED RELEASE ORAL DAILY
Qty: 90 CAPSULE | Refills: 0 | Status: SHIPPED | OUTPATIENT
Start: 2018-02-16 | End: 2018-03-15 | Stop reason: SDUPTHER

## 2018-02-16 RX ORDER — POTASSIUM CHLORIDE 750 MG/1
10 TABLET, FILM COATED, EXTENDED RELEASE ORAL DAILY
Qty: 90 TABLET | Refills: 0 | Status: SHIPPED | OUTPATIENT
Start: 2018-02-16 | End: 2018-03-15 | Stop reason: SDUPTHER

## 2018-03-12 RX ORDER — GLIMEPIRIDE 4 MG/1
4 TABLET ORAL
Qty: 90 TABLET | Refills: 1 | Status: SHIPPED | OUTPATIENT
Start: 2018-03-12 | End: 2018-08-13 | Stop reason: SDUPTHER

## 2018-03-12 RX ORDER — METOPROLOL SUCCINATE 50 MG/1
50 TABLET, EXTENDED RELEASE ORAL DAILY
Qty: 90 TABLET | Refills: 1 | Status: SHIPPED | OUTPATIENT
Start: 2018-03-12 | End: 2018-08-13 | Stop reason: SDUPTHER

## 2018-03-15 ENCOUNTER — OFFICE VISIT (OUTPATIENT)
Dept: INTERNAL MEDICINE CLINIC | Age: 73
End: 2018-03-15

## 2018-03-15 VITALS
WEIGHT: 222 LBS | RESPIRATION RATE: 18 BRPM | DIASTOLIC BLOOD PRESSURE: 75 MMHG | SYSTOLIC BLOOD PRESSURE: 125 MMHG | HEIGHT: 71 IN | BODY MASS INDEX: 31.08 KG/M2 | HEART RATE: 70 BPM

## 2018-03-15 DIAGNOSIS — E78.2 MIXED HYPERLIPIDEMIA: ICD-10-CM

## 2018-03-15 DIAGNOSIS — E11.8 TYPE 2 DIABETES MELLITUS WITH COMPLICATION, WITHOUT LONG-TERM CURRENT USE OF INSULIN (HCC): ICD-10-CM

## 2018-03-15 DIAGNOSIS — I25.10 CORONARY ARTERY DISEASE INVOLVING NATIVE CORONARY ARTERY OF NATIVE HEART WITHOUT ANGINA PECTORIS: ICD-10-CM

## 2018-03-15 DIAGNOSIS — I71.40 ABDOMINAL AORTIC ANEURYSM (AAA) WITHOUT RUPTURE: ICD-10-CM

## 2018-03-15 DIAGNOSIS — E11.8 TYPE 2 DIABETES MELLITUS WITH COMPLICATION, WITHOUT LONG-TERM CURRENT USE OF INSULIN (HCC): Primary | ICD-10-CM

## 2018-03-15 DIAGNOSIS — I10 ESSENTIAL HYPERTENSION: ICD-10-CM

## 2018-03-15 DIAGNOSIS — Z11.59 ENCOUNTER FOR HEPATITIS C SCREENING TEST FOR LOW RISK PATIENT: ICD-10-CM

## 2018-03-15 PROCEDURE — G8417 CALC BMI ABV UP PARAM F/U: HCPCS | Performed by: INTERNAL MEDICINE

## 2018-03-15 PROCEDURE — 1123F ACP DISCUSS/DSCN MKR DOCD: CPT | Performed by: INTERNAL MEDICINE

## 2018-03-15 PROCEDURE — 4004F PT TOBACCO SCREEN RCVD TLK: CPT | Performed by: INTERNAL MEDICINE

## 2018-03-15 PROCEDURE — 81002 URINALYSIS NONAUTO W/O SCOPE: CPT | Performed by: INTERNAL MEDICINE

## 2018-03-15 PROCEDURE — G8427 DOCREV CUR MEDS BY ELIG CLIN: HCPCS | Performed by: INTERNAL MEDICINE

## 2018-03-15 PROCEDURE — G8598 ASA/ANTIPLAT THER USED: HCPCS | Performed by: INTERNAL MEDICINE

## 2018-03-15 PROCEDURE — 3017F COLORECTAL CA SCREEN DOC REV: CPT | Performed by: INTERNAL MEDICINE

## 2018-03-15 PROCEDURE — 99214 OFFICE O/P EST MOD 30 MIN: CPT | Performed by: INTERNAL MEDICINE

## 2018-03-15 PROCEDURE — 3046F HEMOGLOBIN A1C LEVEL >9.0%: CPT | Performed by: INTERNAL MEDICINE

## 2018-03-15 PROCEDURE — 4040F PNEUMOC VAC/ADMIN/RCVD: CPT | Performed by: INTERNAL MEDICINE

## 2018-03-15 PROCEDURE — G8482 FLU IMMUNIZE ORDER/ADMIN: HCPCS | Performed by: INTERNAL MEDICINE

## 2018-03-15 RX ORDER — POTASSIUM CHLORIDE 750 MG/1
10 TABLET, FILM COATED, EXTENDED RELEASE ORAL DAILY
Qty: 90 TABLET | Refills: 0 | Status: SHIPPED | OUTPATIENT
Start: 2018-03-15 | End: 2018-08-13 | Stop reason: SDUPTHER

## 2018-03-15 RX ORDER — CITALOPRAM 20 MG/1
20 TABLET ORAL DAILY
Qty: 90 TABLET | Refills: 0 | Status: ON HOLD | OUTPATIENT
Start: 2018-03-15 | End: 2018-07-31

## 2018-03-15 RX ORDER — LISINOPRIL 40 MG/1
40 TABLET ORAL DAILY
Qty: 90 TABLET | Refills: 0 | Status: SHIPPED | OUTPATIENT
Start: 2018-03-15 | End: 2018-06-27 | Stop reason: SDUPTHER

## 2018-03-15 RX ORDER — FUROSEMIDE 40 MG/1
40 TABLET ORAL DAILY
Qty: 90 TABLET | Refills: 0 | Status: SHIPPED | OUTPATIENT
Start: 2018-03-15 | End: 2018-12-04 | Stop reason: SDUPTHER

## 2018-03-15 RX ORDER — OMEPRAZOLE 20 MG/1
20 CAPSULE, DELAYED RELEASE ORAL DAILY
Qty: 90 CAPSULE | Refills: 0 | Status: SHIPPED | OUTPATIENT
Start: 2018-03-15 | End: 2018-08-02 | Stop reason: SDUPTHER

## 2018-03-15 RX ORDER — PRAVASTATIN SODIUM 80 MG/1
80 TABLET ORAL DAILY
Qty: 90 TABLET | Refills: 0 | Status: SHIPPED | OUTPATIENT
Start: 2018-03-15 | End: 2018-07-17 | Stop reason: SDUPTHER

## 2018-03-15 RX ORDER — ZOLPIDEM TARTRATE 10 MG/1
10 TABLET ORAL NIGHTLY PRN
Qty: 90 TABLET | Refills: 0 | Status: SHIPPED | OUTPATIENT
Start: 2018-03-15 | End: 2018-06-11 | Stop reason: SDUPTHER

## 2018-03-15 NOTE — PROGRESS NOTES
palms - suspect eczema ,improved with  local steroids  Add       [facility] - s/p excision     Make appt with ophthal  For eye exam

## 2018-03-16 LAB
A/G RATIO: 2.3 (ref 1.1–2.2)
ALBUMIN SERPL-MCNC: 4.8 G/DL (ref 3.4–5)
ALP BLD-CCNC: 95 U/L (ref 40–129)
ALT SERPL-CCNC: 12 U/L (ref 10–40)
ANION GAP SERPL CALCULATED.3IONS-SCNC: 15 MMOL/L (ref 3–16)
AST SERPL-CCNC: 19 U/L (ref 15–37)
BASOPHILS ABSOLUTE: 0.1 K/UL (ref 0–0.2)
BASOPHILS RELATIVE PERCENT: 0.5 %
BILIRUB SERPL-MCNC: 0.8 MG/DL (ref 0–1)
BUN BLDV-MCNC: 13 MG/DL (ref 7–20)
CALCIUM SERPL-MCNC: 9.6 MG/DL (ref 8.3–10.6)
CHLORIDE BLD-SCNC: 97 MMOL/L (ref 99–110)
CHOLESTEROL, TOTAL: 168 MG/DL (ref 0–199)
CO2: 24 MMOL/L (ref 21–32)
CREAT SERPL-MCNC: 0.9 MG/DL (ref 0.8–1.3)
CREATININE URINE: 88.1 MG/DL (ref 39–259)
EOSINOPHILS ABSOLUTE: 0.1 K/UL (ref 0–0.6)
EOSINOPHILS RELATIVE PERCENT: 0.9 %
ESTIMATED AVERAGE GLUCOSE: 142.7 MG/DL
GFR AFRICAN AMERICAN: >60
GFR NON-AFRICAN AMERICAN: >60
GLOBULIN: 2.1 G/DL
GLUCOSE BLD-MCNC: 130 MG/DL (ref 70–99)
HBA1C MFR BLD: 6.6 %
HCT VFR BLD CALC: 43.1 % (ref 40.5–52.5)
HDLC SERPL-MCNC: 30 MG/DL (ref 40–60)
HEMOGLOBIN: 14.9 G/DL (ref 13.5–17.5)
HEPATITIS C ANTIBODY INTERPRETATION: NORMAL
LDL CHOLESTEROL CALCULATED: ABNORMAL MG/DL
LDL CHOLESTEROL DIRECT: 97 MG/DL
LYMPHOCYTES ABSOLUTE: 2 K/UL (ref 1–5.1)
LYMPHOCYTES RELATIVE PERCENT: 19.2 %
MCH RBC QN AUTO: 30.4 PG (ref 26–34)
MCHC RBC AUTO-ENTMCNC: 34.5 G/DL (ref 31–36)
MCV RBC AUTO: 88.3 FL (ref 80–100)
MICROALBUMIN UR-MCNC: 2.4 MG/DL
MICROALBUMIN/CREAT UR-RTO: 27.2 MG/G (ref 0–30)
MONOCYTES ABSOLUTE: 0.6 K/UL (ref 0–1.3)
MONOCYTES RELATIVE PERCENT: 5.5 %
NEUTROPHILS ABSOLUTE: 7.8 K/UL (ref 1.7–7.7)
NEUTROPHILS RELATIVE PERCENT: 73.9 %
PDW BLD-RTO: 14.7 % (ref 12.4–15.4)
PLATELET # BLD: 284 K/UL (ref 135–450)
PMV BLD AUTO: 8.5 FL (ref 5–10.5)
POTASSIUM SERPL-SCNC: 4.1 MMOL/L (ref 3.5–5.1)
RBC # BLD: 4.88 M/UL (ref 4.2–5.9)
SODIUM BLD-SCNC: 136 MMOL/L (ref 136–145)
TOTAL PROTEIN: 6.9 G/DL (ref 6.4–8.2)
TRIGL SERPL-MCNC: 320 MG/DL (ref 0–150)
VLDLC SERPL CALC-MCNC: ABNORMAL MG/DL
WBC # BLD: 10.6 K/UL (ref 4–11)

## 2018-04-30 RX ORDER — TIZANIDINE HYDROCHLORIDE 2 MG/1
2 CAPSULE, GELATIN COATED ORAL 2 TIMES DAILY PRN
Qty: 20 CAPSULE | Refills: 0 | Status: SHIPPED | OUTPATIENT
Start: 2018-04-30 | End: 2018-05-04 | Stop reason: SDUPTHER

## 2018-05-04 RX ORDER — TIZANIDINE 2 MG/1
TABLET ORAL
Qty: 20 TABLET | Refills: 0 | Status: SHIPPED | OUTPATIENT
Start: 2018-05-04 | End: 2018-06-11 | Stop reason: SDUPTHER

## 2018-05-18 ENCOUNTER — OFFICE VISIT (OUTPATIENT)
Dept: INTERNAL MEDICINE CLINIC | Age: 73
End: 2018-05-18

## 2018-05-18 VITALS
RESPIRATION RATE: 18 BRPM | DIASTOLIC BLOOD PRESSURE: 75 MMHG | SYSTOLIC BLOOD PRESSURE: 125 MMHG | HEIGHT: 71 IN | HEART RATE: 70 BPM | WEIGHT: 215 LBS | BODY MASS INDEX: 30.1 KG/M2

## 2018-05-18 DIAGNOSIS — G89.29 CHRONIC MIDLINE LOW BACK PAIN WITHOUT SCIATICA: Primary | ICD-10-CM

## 2018-05-18 DIAGNOSIS — R10.12 LEFT UPPER QUADRANT PAIN: ICD-10-CM

## 2018-05-18 DIAGNOSIS — M54.50 CHRONIC MIDLINE LOW BACK PAIN WITHOUT SCIATICA: Primary | ICD-10-CM

## 2018-05-18 PROCEDURE — 4040F PNEUMOC VAC/ADMIN/RCVD: CPT | Performed by: INTERNAL MEDICINE

## 2018-05-18 PROCEDURE — 3017F COLORECTAL CA SCREEN DOC REV: CPT | Performed by: INTERNAL MEDICINE

## 2018-05-18 PROCEDURE — G8417 CALC BMI ABV UP PARAM F/U: HCPCS | Performed by: INTERNAL MEDICINE

## 2018-05-18 PROCEDURE — G8598 ASA/ANTIPLAT THER USED: HCPCS | Performed by: INTERNAL MEDICINE

## 2018-05-18 PROCEDURE — G8427 DOCREV CUR MEDS BY ELIG CLIN: HCPCS | Performed by: INTERNAL MEDICINE

## 2018-05-18 PROCEDURE — 1123F ACP DISCUSS/DSCN MKR DOCD: CPT | Performed by: INTERNAL MEDICINE

## 2018-05-18 PROCEDURE — 99213 OFFICE O/P EST LOW 20 MIN: CPT | Performed by: INTERNAL MEDICINE

## 2018-05-18 PROCEDURE — 4004F PT TOBACCO SCREEN RCVD TLK: CPT | Performed by: INTERNAL MEDICINE

## 2018-05-18 RX ORDER — MELOXICAM 15 MG/1
15 TABLET ORAL DAILY
Qty: 7 TABLET | Refills: 0 | Status: SHIPPED | OUTPATIENT
Start: 2018-05-18 | End: 2018-08-29

## 2018-05-18 ASSESSMENT — ENCOUNTER SYMPTOMS: BACK PAIN: 1

## 2018-05-25 ENCOUNTER — TELEPHONE (OUTPATIENT)
Dept: INTERNAL MEDICINE CLINIC | Age: 73
End: 2018-05-25

## 2018-05-25 DIAGNOSIS — M54.50 ACUTE MIDLINE LOW BACK PAIN WITHOUT SCIATICA: Primary | ICD-10-CM

## 2018-05-25 RX ORDER — TRAMADOL HYDROCHLORIDE 50 MG/1
50 TABLET ORAL EVERY 8 HOURS PRN
Qty: 12 TABLET | Refills: 0 | Status: SHIPPED | OUTPATIENT
Start: 2018-05-25 | End: 2018-05-30 | Stop reason: SDUPTHER

## 2018-05-29 ENCOUNTER — HOSPITAL ENCOUNTER (OUTPATIENT)
Dept: OTHER | Age: 73
Discharge: HOME | End: 2018-05-29
Attending: INTERNAL MEDICINE | Admitting: INTERNAL MEDICINE

## 2018-05-29 DIAGNOSIS — M54.50 ACUTE MIDLINE LOW BACK PAIN WITHOUT SCIATICA: ICD-10-CM

## 2018-05-30 DIAGNOSIS — M54.50 ACUTE MIDLINE LOW BACK PAIN WITHOUT SCIATICA: ICD-10-CM

## 2018-05-31 RX ORDER — TRAMADOL HYDROCHLORIDE 50 MG/1
50 TABLET ORAL EVERY 8 HOURS PRN
Qty: 20 TABLET | Refills: 0 | Status: SHIPPED | OUTPATIENT
Start: 2018-05-31 | End: 2018-06-14 | Stop reason: SDUPTHER

## 2018-06-11 DIAGNOSIS — F51.01 PRIMARY INSOMNIA: Primary | ICD-10-CM

## 2018-06-11 RX ORDER — METHOCARBAMOL 500 MG/1
500 TABLET, FILM COATED ORAL 3 TIMES DAILY
Qty: 15 TABLET | Refills: 0 | Status: SHIPPED | OUTPATIENT
Start: 2018-06-11 | End: 2018-06-16

## 2018-06-11 RX ORDER — ZOLPIDEM TARTRATE 10 MG/1
10 TABLET ORAL NIGHTLY PRN
Qty: 90 TABLET | Refills: 0 | Status: SHIPPED | OUTPATIENT
Start: 2018-06-11 | End: 2018-09-10 | Stop reason: SDUPTHER

## 2018-06-14 DIAGNOSIS — M54.50 ACUTE MIDLINE LOW BACK PAIN WITHOUT SCIATICA: ICD-10-CM

## 2018-06-14 RX ORDER — TRAMADOL HYDROCHLORIDE 50 MG/1
50 TABLET ORAL EVERY 8 HOURS PRN
Qty: 60 TABLET | Refills: 0 | OUTPATIENT
Start: 2018-06-14 | End: 2018-07-14

## 2018-06-18 ENCOUNTER — HOSPITAL ENCOUNTER (OUTPATIENT)
Dept: MRI IMAGING | Age: 73
Discharge: HOME | End: 2018-06-18
Attending: INTERNAL MEDICINE | Admitting: INTERNAL MEDICINE

## 2018-06-18 DIAGNOSIS — M54.50 LOW BACK PAIN: ICD-10-CM

## 2018-06-18 DIAGNOSIS — M54.50 ACUTE MIDLINE LOW BACK PAIN WITHOUT SCIATICA: ICD-10-CM

## 2018-06-19 ENCOUNTER — TELEPHONE (OUTPATIENT)
Dept: INTERNAL MEDICINE CLINIC | Age: 73
End: 2018-06-19

## 2018-06-22 ENCOUNTER — TELEPHONE (OUTPATIENT)
Dept: INTERNAL MEDICINE CLINIC | Age: 73
End: 2018-06-22

## 2018-06-28 DIAGNOSIS — M54.41 ACUTE MIDLINE LOW BACK PAIN WITH BILATERAL SCIATICA: ICD-10-CM

## 2018-06-28 DIAGNOSIS — M54.42 ACUTE MIDLINE LOW BACK PAIN WITH BILATERAL SCIATICA: ICD-10-CM

## 2018-06-28 RX ORDER — LISINOPRIL 40 MG/1
40 TABLET ORAL DAILY
Qty: 90 TABLET | Refills: 0 | Status: SHIPPED | OUTPATIENT
Start: 2018-06-28 | End: 2018-08-13 | Stop reason: SDUPTHER

## 2018-06-28 RX ORDER — HYDROCODONE BITARTRATE AND ACETAMINOPHEN 5; 325 MG/1; MG/1
1 TABLET ORAL EVERY 6 HOURS PRN
Qty: 28 TABLET | Refills: 0 | Status: SHIPPED | OUTPATIENT
Start: 2018-06-28 | End: 2018-07-05 | Stop reason: SDUPTHER

## 2018-07-05 DIAGNOSIS — M54.42 ACUTE MIDLINE LOW BACK PAIN WITH BILATERAL SCIATICA: ICD-10-CM

## 2018-07-05 DIAGNOSIS — M54.41 ACUTE MIDLINE LOW BACK PAIN WITH BILATERAL SCIATICA: ICD-10-CM

## 2018-07-05 RX ORDER — HYDROCODONE BITARTRATE AND ACETAMINOPHEN 5; 325 MG/1; MG/1
1 TABLET ORAL EVERY 6 HOURS PRN
Qty: 28 TABLET | Refills: 0 | Status: SHIPPED | OUTPATIENT
Start: 2018-07-05 | End: 2018-07-13 | Stop reason: SDUPTHER

## 2018-07-13 DIAGNOSIS — M54.41 ACUTE MIDLINE LOW BACK PAIN WITH BILATERAL SCIATICA: ICD-10-CM

## 2018-07-13 DIAGNOSIS — M54.42 ACUTE MIDLINE LOW BACK PAIN WITH BILATERAL SCIATICA: ICD-10-CM

## 2018-07-13 RX ORDER — HYDROCODONE BITARTRATE AND ACETAMINOPHEN 5; 325 MG/1; MG/1
1 TABLET ORAL EVERY 6 HOURS PRN
Qty: 28 TABLET | Refills: 0 | Status: SHIPPED | OUTPATIENT
Start: 2018-07-13 | End: 2018-07-20 | Stop reason: SDUPTHER

## 2018-07-16 ENCOUNTER — OFFICE VISIT (OUTPATIENT)
Dept: ORTHOPEDIC SURGERY | Age: 73
End: 2018-07-16

## 2018-07-16 ENCOUNTER — PRE-EVALUATION (OUTPATIENT)
Dept: ORTHOPEDIC SURGERY | Age: 73
End: 2018-07-16

## 2018-07-16 VITALS
WEIGHT: 215 LBS | SYSTOLIC BLOOD PRESSURE: 196 MMHG | BODY MASS INDEX: 30.1 KG/M2 | HEART RATE: 67 BPM | DIASTOLIC BLOOD PRESSURE: 94 MMHG | HEIGHT: 71 IN

## 2018-07-16 DIAGNOSIS — M51.36 LUMBAR DEGENERATIVE DISC DISEASE: Primary | ICD-10-CM

## 2018-07-16 DIAGNOSIS — M51.36 DDD (DEGENERATIVE DISC DISEASE), LUMBAR: ICD-10-CM

## 2018-07-16 PROCEDURE — 99213 OFFICE O/P EST LOW 20 MIN: CPT | Performed by: ORTHOPAEDIC SURGERY

## 2018-07-16 PROCEDURE — 3017F COLORECTAL CA SCREEN DOC REV: CPT | Performed by: ORTHOPAEDIC SURGERY

## 2018-07-16 PROCEDURE — 4004F PT TOBACCO SCREEN RCVD TLK: CPT | Performed by: ORTHOPAEDIC SURGERY

## 2018-07-16 PROCEDURE — 1101F PT FALLS ASSESS-DOCD LE1/YR: CPT | Performed by: ORTHOPAEDIC SURGERY

## 2018-07-16 PROCEDURE — 1123F ACP DISCUSS/DSCN MKR DOCD: CPT | Performed by: ORTHOPAEDIC SURGERY

## 2018-07-16 PROCEDURE — 4040F PNEUMOC VAC/ADMIN/RCVD: CPT | Performed by: ORTHOPAEDIC SURGERY

## 2018-07-16 PROCEDURE — APPNB30 APP NON BILLABLE TIME 0-30 MINS: Performed by: PHYSICIAN ASSISTANT

## 2018-07-16 PROCEDURE — G8417 CALC BMI ABV UP PARAM F/U: HCPCS | Performed by: ORTHOPAEDIC SURGERY

## 2018-07-16 PROCEDURE — G8427 DOCREV CUR MEDS BY ELIG CLIN: HCPCS | Performed by: ORTHOPAEDIC SURGERY

## 2018-07-16 PROCEDURE — G8598 ASA/ANTIPLAT THER USED: HCPCS | Performed by: ORTHOPAEDIC SURGERY

## 2018-07-16 NOTE — PROGRESS NOTES
History of present illness:   Mr. Prosper Bruno  is a pleasant 72 y.o. male with a PMH of diabetes, Rheumatic fever, HTN, heart disease, skin cancer, and CAD kindly referred by Dr. Almas Khanna for consultation regarding his LBP. He states his pain began insidiously many years ago. His pain has steadily increased since then, but worst over the past 1 month. He rates his back pain 9/10 and leg pain 7-8/10. The leg pain radiates down the anterior aspect of his leg to his right foot intermittently. His back pain is more severe and constant than his leg pain. He denies numbness and tingling in his legs. He denies weakness of his legs and denies bowel or bladder dysfunction. He states he can sit for a maximum of 30 minutes and stand for a maximum of 30 minutes. The pain moderately disrupts his sleep. He has tried physical therapy in the distant past.  He takes Norco.    Past medical history:  His past medical history has been reviewed and is significant for diabetes, Rheumatic fever, HTN, heart disease, skin cancer, and CAD. Past surgical history:  His past surgical history has been reviewed and is non-contributory to his present illness. His medications and allergies were reviewed. Social history:  His social history has been reviewed and he smokes 1 ppd, 40 pack year. Review of symptoms:  Patient's review of symptoms was reviewed and is significant for back pain and negative for recent weight loss, fatigue, chills, visual disturbances, blood in stool or urine, recent infection, chest pain, or shortness of breath. Physical examination:  Mr. Red Son most recent vitals:  Vitals  BP: (!) 196/94  Pulse: 67  Height: 5' 11\" (180.3 cm)  Weight: 215 lb (97.5 kg)  Body mass index is 29.99 kg/m². He is well-developed and well-nourished, is in obvious pain and alert and oriented to person, place, and time. He demonstrates appropriate mood and affect. His skin is warm and dry.  His gait is normal.

## 2018-07-17 RX ORDER — PRAVASTATIN SODIUM 80 MG/1
80 TABLET ORAL DAILY
Qty: 90 TABLET | Refills: 0 | Status: SHIPPED | OUTPATIENT
Start: 2018-07-17 | End: 2018-08-13 | Stop reason: SDUPTHER

## 2018-07-18 ENCOUNTER — TELEPHONE (OUTPATIENT)
Dept: ORTHOPEDIC SURGERY | Age: 73
End: 2018-07-18

## 2018-07-18 NOTE — TELEPHONE ENCOUNTER
Called and pt states that he has not been scheduled for BLAIR. He states he does not want and injection at all that he would like to have sx.

## 2018-07-20 DIAGNOSIS — M54.41 ACUTE MIDLINE LOW BACK PAIN WITH BILATERAL SCIATICA: ICD-10-CM

## 2018-07-20 DIAGNOSIS — M54.42 ACUTE MIDLINE LOW BACK PAIN WITH BILATERAL SCIATICA: ICD-10-CM

## 2018-07-20 RX ORDER — HYDROCODONE BITARTRATE AND ACETAMINOPHEN 5; 325 MG/1; MG/1
1 TABLET ORAL EVERY 6 HOURS PRN
Qty: 28 TABLET | Refills: 0 | Status: SHIPPED | OUTPATIENT
Start: 2018-07-20 | End: 2018-07-28 | Stop reason: SDUPTHER

## 2018-07-24 ENCOUNTER — TELEPHONE (OUTPATIENT)
Dept: ORTHOPEDIC SURGERY | Age: 73
End: 2018-07-24

## 2018-07-24 NOTE — TELEPHONE ENCOUNTER
Patients wife left a voicemail that he is not able to make the trip and now wants the sx. Please call to give her info and she has questions.

## 2018-07-25 ENCOUNTER — TELEPHONE (OUTPATIENT)
Dept: ORTHOPEDIC SURGERY | Age: 73
End: 2018-07-25

## 2018-07-25 NOTE — TELEPHONE ENCOUNTER
Patient spouse Joseph Shukla has some questions   Patient has a appt tomorrow to discuss sx, they were told he has to sign sx consent forms and then can be scheduled at tomorrow appt    Joseph Gayla wants to know how soon they would be able to schedule- will it be months out?      Pls call Joseph Shukla at [phone] or can try [phone]

## 2018-07-26 ENCOUNTER — OFFICE VISIT (OUTPATIENT)
Dept: ORTHOPEDIC SURGERY | Age: 73
End: 2018-07-26

## 2018-07-26 VITALS
DIASTOLIC BLOOD PRESSURE: 81 MMHG | SYSTOLIC BLOOD PRESSURE: 94 MMHG | BODY MASS INDEX: 30.1 KG/M2 | WEIGHT: 215 LBS | HEIGHT: 71 IN | HEART RATE: 69 BPM

## 2018-07-26 DIAGNOSIS — M51.16 LUMBAR DISC HERNIATION WITH RADICULOPATHY: Primary | ICD-10-CM

## 2018-07-26 PROCEDURE — 3017F COLORECTAL CA SCREEN DOC REV: CPT | Performed by: ORTHOPAEDIC SURGERY

## 2018-07-26 PROCEDURE — 99213 OFFICE O/P EST LOW 20 MIN: CPT | Performed by: ORTHOPAEDIC SURGERY

## 2018-07-26 PROCEDURE — 1101F PT FALLS ASSESS-DOCD LE1/YR: CPT | Performed by: ORTHOPAEDIC SURGERY

## 2018-07-26 PROCEDURE — G8598 ASA/ANTIPLAT THER USED: HCPCS | Performed by: ORTHOPAEDIC SURGERY

## 2018-07-26 PROCEDURE — G8427 DOCREV CUR MEDS BY ELIG CLIN: HCPCS | Performed by: ORTHOPAEDIC SURGERY

## 2018-07-26 PROCEDURE — 4040F PNEUMOC VAC/ADMIN/RCVD: CPT | Performed by: ORTHOPAEDIC SURGERY

## 2018-07-26 PROCEDURE — 4004F PT TOBACCO SCREEN RCVD TLK: CPT | Performed by: ORTHOPAEDIC SURGERY

## 2018-07-26 PROCEDURE — 1123F ACP DISCUSS/DSCN MKR DOCD: CPT | Performed by: ORTHOPAEDIC SURGERY

## 2018-07-26 PROCEDURE — G8417 CALC BMI ABV UP PARAM F/U: HCPCS | Performed by: ORTHOPAEDIC SURGERY

## 2018-07-26 RX ORDER — METHYLPREDNISOLONE 16 MG/1
TABLET ORAL
Qty: 12 TABLET | Refills: 0 | Status: ON HOLD | OUTPATIENT
Start: 2018-07-26 | End: 2018-07-31

## 2018-07-27 ENCOUNTER — TELEPHONE (OUTPATIENT)
Dept: ORTHOPEDIC SURGERY | Age: 73
End: 2018-07-27

## 2018-07-28 DIAGNOSIS — M54.41 ACUTE MIDLINE LOW BACK PAIN WITH BILATERAL SCIATICA: ICD-10-CM

## 2018-07-28 DIAGNOSIS — M54.42 ACUTE MIDLINE LOW BACK PAIN WITH BILATERAL SCIATICA: ICD-10-CM

## 2018-07-30 RX ORDER — HYDROCODONE BITARTRATE AND ACETAMINOPHEN 5; 325 MG/1; MG/1
1 TABLET ORAL EVERY 6 HOURS PRN
Qty: 28 TABLET | Refills: 0 | Status: SHIPPED | OUTPATIENT
Start: 2018-07-30 | End: 2018-08-07 | Stop reason: SDUPTHER

## 2018-07-30 NOTE — H&P
11/15/17   Marisela Black MD   glucose blood VI test strips (JORGE ALBERTO CONTOUR TEST) strip Once daily. DX:E11.9 12/10/15   Marisela Black MD   nitroGLYCERIN (NITROSTAT) 0.4 MG SL tablet Place 1 tablet under the tongue every 5 minutes as needed for Chest pain. 4/20/15   Marisela Black MD   albuterol (PROVENTIL HFA;VENTOLIN HFA) 108 (90 BASE) MCG/ACT inhaler Inhale 2 puffs into the lungs every 6 hours as needed for Wheezing. 4/1/15   Marisela Black MD   aspirin 81 MG EC tablet Take 81 mg by mouth daily. Indications: stopped for surgery    Historical Provider, MD       Vitals: Stable       PHYSICAL EXAM:  HENT: Airway patent and reviewed  Cardiovascular: Normal rate, regular rhythm, normal heart sounds. Pulmonary/Chest: No wheezes. No rhonchi. No rales. Abdominal: Soft. Bowel sounds are normal. No distension. ASA CLASS:         []   I. Normal, healthy adult           [x]   II.  Mild systemic disease            []   III. Severe systemic disease      Sedation plan:   [x]  Local              []  Minimal                  []  General anesthesia    Patient's condition acceptable for planned procedure/sedation. Post Procedure Plan   Return to same level of care   ______________________     The risks and benefits as well as alternatives to the procedure have been discussed with the patient and or family. The patient and or next of kin understands and agrees to proceed.     Lacey Kendall M.D.

## 2018-07-31 ENCOUNTER — HOSPITAL ENCOUNTER (OUTPATIENT)
Age: 73
Setting detail: OUTPATIENT SURGERY
Discharge: HOME | End: 2018-07-31
Attending: PHYSICAL MEDICINE & REHABILITATION | Admitting: PHYSICAL MEDICINE & REHABILITATION
Payer: MEDICARE

## 2018-07-31 ENCOUNTER — TELEPHONE (OUTPATIENT)
Dept: ORTHOPEDIC SURGERY | Age: 73
End: 2018-07-31

## 2018-07-31 VITALS
HEART RATE: 70 BPM | OXYGEN SATURATION: 98 % | RESPIRATION RATE: 16 BRPM | SYSTOLIC BLOOD PRESSURE: 159 MMHG | TEMPERATURE: 97 F | BODY MASS INDEX: 30.1 KG/M2 | WEIGHT: 215 LBS | HEIGHT: 71 IN | DIASTOLIC BLOOD PRESSURE: 78 MMHG

## 2018-07-31 LAB
GLUCOSE BLD-MCNC: 174 MG/DL (ref 70–99)
PERFORMED ON: ABNORMAL

## 2018-07-31 PROCEDURE — 3600000012 HC SURGERY LEVEL 2 ADDTL 15MIN: Performed by: PHYSICAL MEDICINE & REHABILITATION

## 2018-07-31 PROCEDURE — 6360000002 HC RX W HCPCS: Performed by: PHYSICAL MEDICINE & REHABILITATION

## 2018-07-31 PROCEDURE — 6360000004 HC RX CONTRAST MEDICATION: Performed by: PHYSICAL MEDICINE & REHABILITATION

## 2018-07-31 PROCEDURE — 7100000010 HC PHASE II RECOVERY - FIRST 15 MIN: Performed by: PHYSICAL MEDICINE & REHABILITATION

## 2018-07-31 PROCEDURE — 2500000003 HC RX 250 WO HCPCS: Performed by: PHYSICAL MEDICINE & REHABILITATION

## 2018-07-31 PROCEDURE — 2709999900 HC NON-CHARGEABLE SUPPLY: Performed by: PHYSICAL MEDICINE & REHABILITATION

## 2018-07-31 PROCEDURE — 3600000002 HC SURGERY LEVEL 2 BASE: Performed by: PHYSICAL MEDICINE & REHABILITATION

## 2018-07-31 RX ORDER — ZOLPIDEM TARTRATE 10 MG/1
TABLET ORAL NIGHTLY PRN
COMMUNITY
End: 2018-12-04 | Stop reason: ALTCHOICE

## 2018-07-31 RX ORDER — LIDOCAINE HYDROCHLORIDE 10 MG/ML
INJECTION, SOLUTION EPIDURAL; INFILTRATION; INTRACAUDAL; PERINEURAL PRN
Status: DISCONTINUED | OUTPATIENT
Start: 2018-07-31 | End: 2018-07-31 | Stop reason: HOSPADM

## 2018-07-31 RX ORDER — METHYLPREDNISOLONE ACETATE 40 MG/ML
INJECTION, SUSPENSION INTRA-ARTICULAR; INTRALESIONAL; INTRAMUSCULAR; SOFT TISSUE PRN
Status: DISCONTINUED | OUTPATIENT
Start: 2018-07-31 | End: 2018-07-31 | Stop reason: HOSPADM

## 2018-07-31 ASSESSMENT — PAIN - FUNCTIONAL ASSESSMENT: PAIN_FUNCTIONAL_ASSESSMENT: 0-10

## 2018-07-31 ASSESSMENT — PAIN SCALES - GENERAL: PAINLEVEL_OUTOF10: 0

## 2018-07-31 ASSESSMENT — PAIN DESCRIPTION - DESCRIPTORS: DESCRIPTORS: STABBING

## 2018-08-02 RX ORDER — OMEPRAZOLE 20 MG/1
20 CAPSULE, DELAYED RELEASE ORAL DAILY
Qty: 90 CAPSULE | Refills: 0 | Status: SHIPPED | OUTPATIENT
Start: 2018-08-02 | End: 2018-08-13 | Stop reason: SDUPTHER

## 2018-08-06 ENCOUNTER — TELEPHONE (OUTPATIENT)
Dept: ORTHOPEDIC SURGERY | Age: 73
End: 2018-08-06

## 2018-08-06 NOTE — TELEPHONE ENCOUNTER
Called and spoke to patient's wife. BLAIR and oral steroids did not help. They would like to schedule for surgery. I scheduled patient on 9/7/18. Dr. Kirk Ely you give me surgery name and levels?

## 2018-08-07 DIAGNOSIS — M54.41 ACUTE MIDLINE LOW BACK PAIN WITH BILATERAL SCIATICA: ICD-10-CM

## 2018-08-07 DIAGNOSIS — M54.42 ACUTE MIDLINE LOW BACK PAIN WITH BILATERAL SCIATICA: ICD-10-CM

## 2018-08-08 ENCOUNTER — TELEPHONE (OUTPATIENT)
Dept: ORTHOPEDIC SURGERY | Age: 73
End: 2018-08-08

## 2018-08-09 DIAGNOSIS — M54.42 ACUTE MIDLINE LOW BACK PAIN WITH BILATERAL SCIATICA: ICD-10-CM

## 2018-08-09 DIAGNOSIS — M54.41 ACUTE MIDLINE LOW BACK PAIN WITH BILATERAL SCIATICA: ICD-10-CM

## 2018-08-09 RX ORDER — HYDROCODONE BITARTRATE AND ACETAMINOPHEN 5; 325 MG/1; MG/1
TABLET ORAL
Qty: 28 TABLET | Refills: 0 | Status: SHIPPED | OUTPATIENT
Start: 2018-08-09 | End: 2018-08-20 | Stop reason: SDUPTHER

## 2018-08-09 RX ORDER — HYDROCODONE BITARTRATE AND ACETAMINOPHEN 5; 325 MG/1; MG/1
TABLET ORAL
Qty: 120 TABLET | Refills: 0 | Status: CANCELLED | OUTPATIENT
Start: 2018-08-09 | End: 2018-09-08

## 2018-08-09 NOTE — TELEPHONE ENCOUNTER
----- Message from Swathi Mike MD sent at 8/9/2018 12:13 PM EDT -----  Contact: wife  Check oarrs and give  ----- Message -----  From: Erendira Figueroa  Sent: 8/9/2018  10:16 AM  To: Swathi Mike MD    You have been refilling the norco for only 7 days at a time. Do you want to refill for 30 days, that will given enough till his surgery.    ----- Message -----  From: Kerri Fraire  Sent: 8/9/2018   9:34 AM  To: Swathi Mike MD    Requesting refill of hydrocodone-has none left  [facility] [phone]  Critical access hospital Surgery scheduled for 9/7 and H&P 8/31

## 2018-08-13 DIAGNOSIS — M54.41 ACUTE MIDLINE LOW BACK PAIN WITH BILATERAL SCIATICA: ICD-10-CM

## 2018-08-13 DIAGNOSIS — F51.01 PRIMARY INSOMNIA: ICD-10-CM

## 2018-08-13 DIAGNOSIS — M54.42 ACUTE MIDLINE LOW BACK PAIN WITH BILATERAL SCIATICA: ICD-10-CM

## 2018-08-13 RX ORDER — CITALOPRAM 20 MG/1
20 TABLET ORAL DAILY
Qty: 90 TABLET | Refills: 0 | Status: ON HOLD | OUTPATIENT
Start: 2018-08-13 | End: 2018-10-08

## 2018-08-13 RX ORDER — OMEPRAZOLE 20 MG/1
20 CAPSULE, DELAYED RELEASE ORAL DAILY
Qty: 90 CAPSULE | Refills: 0 | Status: SHIPPED | OUTPATIENT
Start: 2018-08-13 | End: 2018-08-29 | Stop reason: SDUPTHER

## 2018-08-13 RX ORDER — POTASSIUM CHLORIDE 750 MG/1
10 TABLET, FILM COATED, EXTENDED RELEASE ORAL DAILY
Qty: 90 TABLET | Refills: 0 | Status: SHIPPED | OUTPATIENT
Start: 2018-08-13 | End: 2018-11-07 | Stop reason: SDUPTHER

## 2018-08-13 RX ORDER — OMEPRAZOLE 20 MG/1
20 CAPSULE, DELAYED RELEASE ORAL DAILY
Qty: 90 CAPSULE | Refills: 0 | Status: SHIPPED | OUTPATIENT
Start: 2018-08-13 | End: 2019-01-31 | Stop reason: SDUPTHER

## 2018-08-13 RX ORDER — GLIMEPIRIDE 4 MG/1
4 TABLET ORAL
Qty: 90 TABLET | Refills: 0 | Status: SHIPPED | OUTPATIENT
Start: 2018-08-13 | End: 2018-12-07 | Stop reason: SDUPTHER

## 2018-08-13 RX ORDER — PRAVASTATIN SODIUM 80 MG/1
80 TABLET ORAL DAILY
Qty: 90 TABLET | Refills: 0 | Status: SHIPPED | OUTPATIENT
Start: 2018-08-13 | End: 2018-08-30 | Stop reason: SDUPTHER

## 2018-08-13 RX ORDER — METOPROLOL SUCCINATE 50 MG/1
50 TABLET, EXTENDED RELEASE ORAL DAILY
Qty: 90 TABLET | Refills: 0 | Status: SHIPPED | OUTPATIENT
Start: 2018-08-13 | End: 2018-08-30 | Stop reason: SDUPTHER

## 2018-08-13 RX ORDER — LISINOPRIL 40 MG/1
40 TABLET ORAL DAILY
Qty: 90 TABLET | Refills: 0 | Status: SHIPPED | OUTPATIENT
Start: 2018-08-13 | End: 2018-08-30 | Stop reason: SDUPTHER

## 2018-08-13 RX ORDER — HYDROCODONE BITARTRATE AND ACETAMINOPHEN 5; 325 MG/1; MG/1
TABLET ORAL
Qty: 28 TABLET | Refills: 0 | OUTPATIENT
Start: 2018-08-13

## 2018-08-13 RX ORDER — ZOLPIDEM TARTRATE 10 MG/1
TABLET ORAL
Qty: 90 TABLET | Refills: 0 | OUTPATIENT
Start: 2018-08-13

## 2018-08-20 DIAGNOSIS — M54.42 ACUTE MIDLINE LOW BACK PAIN WITH BILATERAL SCIATICA: ICD-10-CM

## 2018-08-20 DIAGNOSIS — M54.41 ACUTE MIDLINE LOW BACK PAIN WITH BILATERAL SCIATICA: ICD-10-CM

## 2018-08-22 RX ORDER — HYDROCODONE BITARTRATE AND ACETAMINOPHEN 5; 325 MG/1; MG/1
TABLET ORAL
Qty: 28 TABLET | Refills: 0 | Status: SHIPPED | OUTPATIENT
Start: 2018-08-22 | End: 2018-08-30 | Stop reason: SDUPTHER

## 2018-08-29 PROBLEM — Z01.810 PREOPERATIVE CARDIOVASCULAR EXAMINATION: Status: ACTIVE | Noted: 2018-08-29

## 2018-08-30 ENCOUNTER — OFFICE VISIT (OUTPATIENT)
Dept: CARDIOLOGY CLINIC | Age: 73
End: 2018-08-30

## 2018-08-30 ENCOUNTER — OFFICE VISIT (OUTPATIENT)
Dept: INTERNAL MEDICINE CLINIC | Age: 73
End: 2018-08-30

## 2018-08-30 VITALS
RESPIRATION RATE: 18 BRPM | DIASTOLIC BLOOD PRESSURE: 75 MMHG | HEART RATE: 70 BPM | BODY MASS INDEX: 30.52 KG/M2 | SYSTOLIC BLOOD PRESSURE: 115 MMHG | HEIGHT: 71 IN | WEIGHT: 218 LBS

## 2018-08-30 VITALS
WEIGHT: 218 LBS | OXYGEN SATURATION: 96 % | HEIGHT: 70 IN | SYSTOLIC BLOOD PRESSURE: 112 MMHG | HEART RATE: 78 BPM | DIASTOLIC BLOOD PRESSURE: 62 MMHG | BODY MASS INDEX: 31.21 KG/M2

## 2018-08-30 DIAGNOSIS — M54.41 ACUTE MIDLINE LOW BACK PAIN WITH BILATERAL SCIATICA: ICD-10-CM

## 2018-08-30 DIAGNOSIS — E11.8 TYPE 2 DIABETES MELLITUS WITH COMPLICATION, WITHOUT LONG-TERM CURRENT USE OF INSULIN (HCC): ICD-10-CM

## 2018-08-30 DIAGNOSIS — Z01.810 PREOPERATIVE CARDIOVASCULAR EXAMINATION: Primary | ICD-10-CM

## 2018-08-30 DIAGNOSIS — M54.42 ACUTE MIDLINE LOW BACK PAIN WITH BILATERAL SCIATICA: ICD-10-CM

## 2018-08-30 DIAGNOSIS — Z01.818 PRE-OP EXAM: Primary | ICD-10-CM

## 2018-08-30 DIAGNOSIS — I25.10 CORONARY ARTERY DISEASE INVOLVING NATIVE CORONARY ARTERY OF NATIVE HEART WITHOUT ANGINA PECTORIS: ICD-10-CM

## 2018-08-30 DIAGNOSIS — Z01.818 PRE-OP EXAM: ICD-10-CM

## 2018-08-30 DIAGNOSIS — E78.2 MIXED HYPERLIPIDEMIA: ICD-10-CM

## 2018-08-30 DIAGNOSIS — I10 ESSENTIAL HYPERTENSION: ICD-10-CM

## 2018-08-30 DIAGNOSIS — I71.40 ABDOMINAL AORTIC ANEURYSM (AAA) WITHOUT RUPTURE: ICD-10-CM

## 2018-08-30 DIAGNOSIS — M51.36 DDD (DEGENERATIVE DISC DISEASE), LUMBAR: ICD-10-CM

## 2018-08-30 PROCEDURE — 3044F HG A1C LEVEL LT 7.0%: CPT | Performed by: INTERNAL MEDICINE

## 2018-08-30 PROCEDURE — 4004F PT TOBACCO SCREEN RCVD TLK: CPT | Performed by: INTERNAL MEDICINE

## 2018-08-30 PROCEDURE — 99214 OFFICE O/P EST MOD 30 MIN: CPT | Performed by: INTERNAL MEDICINE

## 2018-08-30 PROCEDURE — G8598 ASA/ANTIPLAT THER USED: HCPCS | Performed by: INTERNAL MEDICINE

## 2018-08-30 PROCEDURE — G8427 DOCREV CUR MEDS BY ELIG CLIN: HCPCS | Performed by: INTERNAL MEDICINE

## 2018-08-30 PROCEDURE — 1101F PT FALLS ASSESS-DOCD LE1/YR: CPT | Performed by: INTERNAL MEDICINE

## 2018-08-30 PROCEDURE — G8417 CALC BMI ABV UP PARAM F/U: HCPCS | Performed by: INTERNAL MEDICINE

## 2018-08-30 PROCEDURE — 3017F COLORECTAL CA SCREEN DOC REV: CPT | Performed by: INTERNAL MEDICINE

## 2018-08-30 PROCEDURE — 4040F PNEUMOC VAC/ADMIN/RCVD: CPT | Performed by: INTERNAL MEDICINE

## 2018-08-30 PROCEDURE — 2022F DILAT RTA XM EVC RTNOPTHY: CPT | Performed by: INTERNAL MEDICINE

## 2018-08-30 PROCEDURE — 1123F ACP DISCUSS/DSCN MKR DOCD: CPT | Performed by: INTERNAL MEDICINE

## 2018-08-30 PROCEDURE — 93000 ELECTROCARDIOGRAM COMPLETE: CPT | Performed by: INTERNAL MEDICINE

## 2018-08-30 RX ORDER — PRAVASTATIN SODIUM 80 MG/1
80 TABLET ORAL DAILY
Qty: 90 TABLET | Refills: 3 | Status: SHIPPED | OUTPATIENT
Start: 2018-08-30 | End: 2019-10-03 | Stop reason: SDUPTHER

## 2018-08-30 RX ORDER — LISINOPRIL 40 MG/1
40 TABLET ORAL DAILY
Qty: 90 TABLET | Refills: 3 | Status: SHIPPED | OUTPATIENT
Start: 2018-08-30 | End: 2019-09-24 | Stop reason: SDUPTHER

## 2018-08-30 RX ORDER — METOPROLOL SUCCINATE 50 MG/1
50 TABLET, EXTENDED RELEASE ORAL DAILY
Qty: 90 TABLET | Refills: 3 | Status: SHIPPED | OUTPATIENT
Start: 2018-08-30 | End: 2019-09-09 | Stop reason: SDUPTHER

## 2018-08-30 RX ORDER — NICOTINE 21 MG/24HR
1 PATCH, TRANSDERMAL 24 HOURS TRANSDERMAL EVERY 24 HOURS
Qty: 15 PATCH | Refills: 0 | Status: ON HOLD | OUTPATIENT
Start: 2018-08-30 | End: 2018-10-08

## 2018-08-30 RX ORDER — HYDROCODONE BITARTRATE AND ACETAMINOPHEN 5; 325 MG/1; MG/1
TABLET ORAL
Qty: 28 TABLET | Refills: 0 | Status: SHIPPED | OUTPATIENT
Start: 2018-08-30 | End: 2018-09-11 | Stop reason: SDUPTHER

## 2018-08-30 ASSESSMENT — PATIENT HEALTH QUESTIONNAIRE - PHQ9
SUM OF ALL RESPONSES TO PHQ QUESTIONS 1-9: 0
SUM OF ALL RESPONSES TO PHQ9 QUESTIONS 1 & 2: 0
2. FEELING DOWN, DEPRESSED OR HOPELESS: 0
1. LITTLE INTEREST OR PLEASURE IN DOING THINGS: 0
SUM OF ALL RESPONSES TO PHQ QUESTIONS 1-9: 0

## 2018-08-30 NOTE — PROGRESS NOTES
[facility]   Cardiac Consultation    Referring Provider:  Mary Jo Walker MD     Chief Complaint   Patient presents with    Cardiac Clearance     back surgery on 09/07/2018 by Dr. Shama Rush Coronary Artery Disease    Hyperlipidemia    Hypertension    Discuss Labs     03/15/2018    Fatigue     pain meds make tired      Subjective: Mr. Michelle Vee presents today for cardiology follow up of CAD, HTN, HLD and preoperative evaluation for upcoming back surgery; only c/o back pain    History of Present Illness:   Mr. Michelle Vee is a 79yo male with a history of CAD s/p MI with 4 stents placed in 2001, HTN, HLD, DM and COPD. He remains 1 ppd smoker >40 years no desire to quit. Note ECHO 5/6/14 with EF of 55%. Most recent EKG 2/4/17 showed NSR; anteroseptal infarct' left axis; LAFB; LVH; and nonspecific ST change (no change from 11/16 study). Most recent ECHO 5/17/17 showed EF of 40-45%; AK of the apical-septal segment; mild AI; grade I DD with normal filling pressures. Most recent [place] 2/14/17 showed large size severe fixed defect involving the apex, apical  segments and mid anteroseptum c/w prior infarct; no ischemia; LV cavity is dilated. - Anteroseptum, apical segments and apex are akinetic; LVEF=30% (no major change from 5/14). Most recent EKG 8/30/18 NSR anterior and inferior infarct pattern; IVCD; nonspecific ST change (no change since 4/15). Today he reports he is feeling OK. States he does get MCALLISTER at times but overall does fine in daily activities. He currently denies any active chest pain, SOB, palpitations, dizziness, edema, or orthopnea/PND. He is schedule to undergo back surgery with Dr Stefanie Hernandez 9/7/18. His wife is present at exam today. He has not been taking his lasix due to frequent bathroom trips. Past Medical History:   has a past medical history of Anxiety; Back pain; CAD (coronary artery disease);  Cancer [facility]); CHF (congestive heart failure) ([facility] COPD (chronic obstructive pulmonary disease) ([facility] Depression; Diabetes mellitus ([facility] Heart disease; Hernia; History of blood transfusion; Hyperlipidemia; Hypertension; Osteoarthritis; Prolonged emergence from general anesthesia; Rheumatic fever; and Type II or unspecified type diabetes mellitus without mention of complication, not stated as uncontrolled. Surgical History:   has a past surgical history that includes Circumcision; Cardiac surgery; Dental surgery; and pr njx dx/ther sbst intrlmnr crv/thrc w/img gdn (Right, 7/31/2018). Social History:  He is  and lives with his wife. They have 1 son and 5 grandchildren. He  reports that he has been smoking Cigarettes for 40+ years. He has a 60 pack-year smoking history. He does not have any smokeless tobacco history on file. He reports that he does not drink alcohol or use illicit drugs. Family History:  family history includes Diabetes in his father; Heart Disease in his mother. Home Medications:  Prior to Admission medications    Medication Sig Start Date End Date Taking?  Authorizing Provider   Multiple Vitamins-Minerals (THERAPEUTIC MULTIVITAMIN-MINERALS) tablet Take 1 tablet by mouth daily   Yes Historical Provider, MD   mupirocin (BACTROBAN) 2 % ointment Apply 2cm to each nostril twice a day for 5 days prior to surgery 8/24/18 8/31/18 Yes RAGHU Hamilton   potassium chloride (KLOR-CON) 10 MEQ extended release tablet TAKE 1 TABLET BY MOUTH DAILY 8/13/18  Yes Jose Antonio Cevallos MD   omeprazole (PRILOSEC) 20 MG delayed release capsule TAKE 1 CAPSULE BY MOUTH DAILY 8/13/18  Yes Jose Antonio Cevallos MD   pravastatin (PRAVACHOL) 80 MG tablet TAKE 1 TABLET BY MOUTH DAILY 8/13/18  Yes Jose Antonio Cevallos MD   lisinopril (PRINIVIL;ZESTRIL) 40 MG tablet TAKE 1 TABLET BY MOUTH DAILY 8/13/18  Yes Jose Antonio Cevallos MD   glimepiride (AMARYL) 4 MG tablet Take 1 tablet by mouth every morning (before breakfast) 8/13/18  Yes Jose Antonio Cevallos MD   metoprolol succinate (L) 03/15/2018    HDL 28 (L) 08/15/2017    HDL 30 (L) 01/23/2017     Lab Results   Component Value Date    LDLCALC see below 03/15/2018    [address] 08/15/2017    LDLCALC 86 01/23/2017     Lab Results   Component Value Date    LABVLDL see below 03/15/2018    LABVLDL 48 08/15/2017    LABVLDL 45 01/23/2017     Lab Results   Component Value Date    CHOLHDLRATIO 6.0 (H) 05/12/2016    CHOLHDLRATIO 5.2 (H) 04/20/2015       Assessment:     1. Hypertension: Stable and well controlled (112/62mmHg) and will continue present medical regimen. 2. Hyperlipidemia:  Last lipids  3/15/18 results I personally reviewed in epic. Overall good numbers except for baseline low HDL and higher TG. Has admitted to poor diet. Recheck now and adjust accordingly. 3. CAD (coronary artery disease) Stable and will continue present medical regimen. There are no concerning symptoms for angina currently. Most recent ECHO 5/17/17 showed EF of 40-45%. There is akinesis of the apical-septal segment; mild AI; grade I DD with normal filling pressures. Most recent [facility] 2/14/17 showed large size severe fixed defect involving the apex, apical  segments and mid anteroseptum c/w prior infarct; no ischemia; LV cavity is dilated. - Anteroseptum, apical segments and apex are akinetic; LVEF=30% (no major change from 5/14). Plan:  1. Discussed importance of smoking cessation but no desire to quit. 2. He is clinically intermediate cardiac risk for intermediate risk type of surgery. He is stable clinically. OK to proceed as planned from cardiac standpoint. OK to stop aspirin 1 week prior to surgery. 3 .check lipids fasting  4. Follow up with me in 6 months    Cost of prescription medications and patient compliance have been reviewed with patient. All questions answered. Thank you for allowing me to participate in the care of this individual.    Christiano Lin.  Jimi Ramires M.D., [address] [facility]

## 2018-08-30 NOTE — PROGRESS NOTES
Surgical History:   Procedure Laterality Date    CARDIAC SURGERY      stents x4    CIRCUMCISION      DENTAL SURGERY      [place] NJX DX/THER SBST INTRLMNR CRV/THRC W/IMG GDN Right 7/31/2018    RIGHT LUMBAR FIVE SIX EPIDURAL STEROID INJECTION SITE CONFIRMED BY FLUOROSCOPY performed by Yenifer Delaney MD at [place] [address]     Family History   Problem Relation Age of Onset    Heart Disease Mother     Diabetes Father      Social History     Social History    Marital status:      Spouse name: N/A    Number of children: N/A    Years of education: N/A     Social History Main Topics    Smoking status: Current Every Day Smoker     Packs/day: 1.00     Years: 40.00     Types: Cigarettes    Smokeless tobacco: Never Used    Alcohol use No    Drug use: No    Sexual activity: Yes     Partners: Female     Other Topics Concern    Not on file     Social History Narrative    No narrative on file     Current Outpatient Prescriptions   Medication Sig Dispense Refill    nicotine (NICODERM CQ) 14 MG/24HR Place 1 patch onto the skin every 24 hours 15 patch 0    pravastatin (PRAVACHOL) 80 MG tablet Take 1 tablet by mouth daily 90 tablet 3    lisinopril (PRINIVIL;ZESTRIL) 40 MG tablet Take 1 tablet by mouth daily 90 tablet 3    metoprolol succinate (TOPROL XL) 50 MG extended release tablet Take 1 tablet by mouth daily 90 tablet 3    Multiple Vitamins-Minerals (THERAPEUTIC MULTIVITAMIN-MINERALS) tablet Take 1 tablet by mouth daily      mupirocin (BACTROBAN) 2 % ointment Apply 2cm to each nostril twice a day for 5 days prior to surgery 1 Tube 0    potassium chloride (KLOR-CON) 10 MEQ extended release tablet TAKE 1 TABLET BY MOUTH DAILY 90 tablet 0    omeprazole (PRILOSEC) 20 MG delayed release capsule TAKE 1 CAPSULE BY MOUTH DAILY 90 capsule 0    glimepiride (AMARYL) 4 MG tablet Take 1 tablet by mouth every morning (before breakfast) 90 tablet 0    citalopram (CELEXA) 20 MG tablet TAKE 1 TABLET BY MOUTH DAILY

## 2018-08-30 NOTE — PATIENT INSTRUCTIONS
He is clinically Intermediate cardiac risk for low risk procedure. He is stable clinically. OK to proceed as planned from cardiac standpoint.   OK to stop aspirin 1 week prior to surgery

## 2018-08-31 LAB
A/G RATIO: 2.5 (ref 1.1–2.2)
ALBUMIN SERPL-MCNC: 4.8 G/DL (ref 3.4–5)
ALP BLD-CCNC: 95 U/L (ref 40–129)
ALT SERPL-CCNC: 12 U/L (ref 10–40)
ANION GAP SERPL CALCULATED.3IONS-SCNC: 16 MMOL/L (ref 3–16)
AST SERPL-CCNC: 15 U/L (ref 15–37)
BASOPHILS ABSOLUTE: 0 K/UL (ref 0–0.2)
BASOPHILS RELATIVE PERCENT: 0.3 %
BILIRUB SERPL-MCNC: 1.1 MG/DL (ref 0–1)
BUN BLDV-MCNC: 9 MG/DL (ref 7–20)
CALCIUM SERPL-MCNC: 10 MG/DL (ref 8.3–10.6)
CHLORIDE BLD-SCNC: 96 MMOL/L (ref 99–110)
CO2: 25 MMOL/L (ref 21–32)
CREAT SERPL-MCNC: 0.7 MG/DL (ref 0.8–1.3)
EOSINOPHILS ABSOLUTE: 0.1 K/UL (ref 0–0.6)
EOSINOPHILS RELATIVE PERCENT: 1.3 %
ESTIMATED AVERAGE GLUCOSE: 145.6 MG/DL
GFR AFRICAN AMERICAN: >60
GFR NON-AFRICAN AMERICAN: >60
GLOBULIN: 1.9 G/DL
GLUCOSE BLD-MCNC: 88 MG/DL (ref 70–99)
HBA1C MFR BLD: 6.7 %
HCT VFR BLD CALC: 41.9 % (ref 40.5–52.5)
HEMOGLOBIN: 14.4 G/DL (ref 13.5–17.5)
LYMPHOCYTES ABSOLUTE: 1.7 K/UL (ref 1–5.1)
LYMPHOCYTES RELATIVE PERCENT: 17.6 %
MCH RBC QN AUTO: 30.4 PG (ref 26–34)
MCHC RBC AUTO-ENTMCNC: 34.2 G/DL (ref 31–36)
MCV RBC AUTO: 88.9 FL (ref 80–100)
MONOCYTES ABSOLUTE: 0.6 K/UL (ref 0–1.3)
MONOCYTES RELATIVE PERCENT: 6.7 %
NEUTROPHILS ABSOLUTE: 7.2 K/UL (ref 1.7–7.7)
NEUTROPHILS RELATIVE PERCENT: 74.1 %
PDW BLD-RTO: 15.5 % (ref 12.4–15.4)
PLATELET # BLD: 272 K/UL (ref 135–450)
PMV BLD AUTO: 7.7 FL (ref 5–10.5)
POTASSIUM SERPL-SCNC: 4.7 MMOL/L (ref 3.5–5.1)
RBC # BLD: 4.72 M/UL (ref 4.2–5.9)
SODIUM BLD-SCNC: 137 MMOL/L (ref 136–145)
TOTAL PROTEIN: 6.7 G/DL (ref 6.4–8.2)
WBC # BLD: 9.7 K/UL (ref 4–11)

## 2018-09-04 DIAGNOSIS — F51.01 PRIMARY INSOMNIA: ICD-10-CM

## 2018-09-04 RX ORDER — ZOLPIDEM TARTRATE 10 MG/1
TABLET ORAL
Qty: 90 TABLET | Refills: 0 | OUTPATIENT
Start: 2018-09-04

## 2018-09-04 NOTE — TELEPHONE ENCOUNTER
----- Message from Radha Fraire sent at 9/4/2018  1:12 PM EDT -----  Contact: daniel Contreras called requesting his h&p be faxed over  [phone] appt- 8-30-18-lr

## 2018-09-06 ENCOUNTER — ANESTHESIA EVENT (OUTPATIENT)
Dept: OPERATING ROOM | Age: 73
End: 2018-09-06
Payer: MEDICARE

## 2018-09-07 ENCOUNTER — ANESTHESIA (OUTPATIENT)
Dept: OPERATING ROOM | Age: 73
End: 2018-09-07
Payer: MEDICARE

## 2018-09-07 ENCOUNTER — TELEPHONE (OUTPATIENT)
Dept: ORTHOPEDIC SURGERY | Age: 73
End: 2018-09-07

## 2018-09-07 DIAGNOSIS — M54.42 ACUTE MIDLINE LOW BACK PAIN WITH BILATERAL SCIATICA: ICD-10-CM

## 2018-09-07 DIAGNOSIS — M54.41 ACUTE MIDLINE LOW BACK PAIN WITH BILATERAL SCIATICA: ICD-10-CM

## 2018-09-07 NOTE — TELEPHONE ENCOUNTER
----- Message from Gianni Fraire sent at 9/7/2018  2:14 PM EDT -----  Contact: pt- [phone]  He needs a refill on his hydrocodone called into [facility] at [phone] appt - [phone]

## 2018-09-07 NOTE — TELEPHONE ENCOUNTER
----- Message from Victor Manuel Pink MD sent at 9/7/2018 11:28 AM EDT -----  Please schedule  Jason Pulling for medial branch blocks with [facility]. I cancelled his surgery.  thanks

## 2018-09-10 DIAGNOSIS — F51.01 PRIMARY INSOMNIA: ICD-10-CM

## 2018-09-10 RX ORDER — ZOLPIDEM TARTRATE 10 MG/1
TABLET ORAL
Qty: 90 TABLET | Refills: 0 | Status: SHIPPED | OUTPATIENT
Start: 2018-09-10 | End: 2018-12-04 | Stop reason: SDUPTHER

## 2018-09-11 DIAGNOSIS — M54.42 ACUTE MIDLINE LOW BACK PAIN WITH BILATERAL SCIATICA: ICD-10-CM

## 2018-09-11 DIAGNOSIS — M54.41 ACUTE MIDLINE LOW BACK PAIN WITH BILATERAL SCIATICA: ICD-10-CM

## 2018-09-11 RX ORDER — HYDROCODONE BITARTRATE AND ACETAMINOPHEN 5; 325 MG/1; MG/1
1 TABLET ORAL EVERY 6 HOURS PRN
Qty: 28 TABLET | Refills: 0 | OUTPATIENT
Start: 2018-09-11 | End: 2018-09-18

## 2018-09-13 ENCOUNTER — OFFICE VISIT (OUTPATIENT)
Dept: ORTHOPEDIC SURGERY | Age: 73
End: 2018-09-13

## 2018-09-13 VITALS
DIASTOLIC BLOOD PRESSURE: 80 MMHG | HEIGHT: 71 IN | HEART RATE: 90 BPM | SYSTOLIC BLOOD PRESSURE: 144 MMHG | WEIGHT: 215 LBS | BODY MASS INDEX: 30.1 KG/M2

## 2018-09-13 DIAGNOSIS — M47.816 LUMBAR FACET ARTHROPATHY: ICD-10-CM

## 2018-09-13 DIAGNOSIS — M47.816 LUMBAR SPONDYLOSIS: ICD-10-CM

## 2018-09-13 DIAGNOSIS — M51.36 DDD (DEGENERATIVE DISC DISEASE), LUMBAR: Primary | ICD-10-CM

## 2018-09-13 PROCEDURE — 1101F PT FALLS ASSESS-DOCD LE1/YR: CPT | Performed by: PHYSICIAN ASSISTANT

## 2018-09-13 PROCEDURE — 99214 OFFICE O/P EST MOD 30 MIN: CPT | Performed by: PHYSICIAN ASSISTANT

## 2018-09-13 PROCEDURE — G8417 CALC BMI ABV UP PARAM F/U: HCPCS | Performed by: PHYSICIAN ASSISTANT

## 2018-09-13 PROCEDURE — 3017F COLORECTAL CA SCREEN DOC REV: CPT | Performed by: PHYSICIAN ASSISTANT

## 2018-09-13 PROCEDURE — 1123F ACP DISCUSS/DSCN MKR DOCD: CPT | Performed by: PHYSICIAN ASSISTANT

## 2018-09-13 PROCEDURE — 4040F PNEUMOC VAC/ADMIN/RCVD: CPT | Performed by: PHYSICIAN ASSISTANT

## 2018-09-13 PROCEDURE — 4004F PT TOBACCO SCREEN RCVD TLK: CPT | Performed by: PHYSICIAN ASSISTANT

## 2018-09-13 PROCEDURE — G8427 DOCREV CUR MEDS BY ELIG CLIN: HCPCS | Performed by: PHYSICIAN ASSISTANT

## 2018-09-13 PROCEDURE — G8598 ASA/ANTIPLAT THER USED: HCPCS | Performed by: PHYSICIAN ASSISTANT

## 2018-09-13 RX ORDER — HYDROCODONE BITARTRATE AND ACETAMINOPHEN 5; 325 MG/1; MG/1
1 TABLET ORAL EVERY 6 HOURS PRN
Qty: 120 TABLET | Refills: 0 | Status: SHIPPED | OUTPATIENT
Start: 2018-09-13 | End: 2018-10-19 | Stop reason: SDUPTHER

## 2018-09-13 NOTE — PROGRESS NOTES
disease     Hernia     History of blood transfusion     Hyperlipidemia     Hypertension     Osteoarthritis     Prolonged emergence from general anesthesia     Rheumatic fever     Type II or unspecified type diabetes mellitus without mention of complication, not stated as uncontrolled         REVIEW OF SYSTEMS:   CONSTITUTIONAL: Denies unexplained weight loss, fevers, chills or fatigue  NEUROLOGIC: Denies tremors or seizures         PHYSICAL EXAM:    Vitals: Blood pressure (!) 144/80, pulse 90, height 5' 10.98\" (1.803 m), weight 215 lb (97.5 kg). GENERAL EXAM:  · General Apparence: Patient is adequately groomed with no evidence of malnutrition. · Orientation: The patient is oriented to time, place and person. · Mood & Affect:The patient's mood and affect are appropriate    · Lymphatic: The lymphatic examination bilaterally reveals all areas to be without enlargement or induration  · Sensation: Sensation is intact without deficit  LUMBAR/SACRAL EXAMINATION:  · Inspection: Local inspection shows no step-off or bruising. Lumbar alignment is normal.  Sagittal and Coronal balance is neutral.      · Palpation:   No evidence of tenderness at the midline. No tenderness bilaterally at the paraspinal or trochanters. There is no step-off or paraspinal spasm. · Range of Motion:   30° of flexion, 10° extension more pain with extension  · Strength:   Strength testing is 5/5 in all muscle groups tested. · Special Tests:   Straight leg raise and crossed SLR negative. Leg length and pelvis level.  0 out of 5 Paige's signs. · Skin: There are no rashes, ulcerations or lesions. · Reflexes: Reflexes are symmetrically 1+ at the patellar and ankle tendons. Clonus absent bilaterally at the feet. · Gait & station: Mildly antalgic unassisted  · Additional Examinations:   ·   · RIGHT LOWER EXTREMITY: Inspection/examination of the right lower extremity does not show any tenderness, deformity or injury.  Range of motion is full. There is no gross instability. There are no rashes, ulcerations or lesions. Strength and tone are normal.  · LEFT LOWER EXTREMITY:  Inspection/examination of the left lower extremity does not show any tenderness, deformity or injury. Range of motion is full. There is no gross instability. There are no rashes, ulcerations or lesions. Strength and tone are normal.    Diagnostic Testing:   Lumbar MRI scan report independently reviewed 6/18/2018 showing 6 lumbar vertebral bodies with multilevel DDD/spondylosis, multilevel facet arthropathy/synovitis. Moderate to severe right foraminal stenosis L4 5. Varying degrees of central and foraminal narrowing        Impression:  1) Subacute/chronic LBP--r/out facet pain  2) Multilevel DDD/spondylosis, facet arthropathy  3) improved right sciatica following BLAIR, right foraminal stenosis L4 5  4) Sx consult--Dr. Madhuri Bar rec: MBB  5) DM, dep, CHF, CAD      Plan:  1) Bilateral L3, L4, L5 MBB #1 to evaluate for contributing facet pain. Procedure risk and benefits discussed. 2) F/u after above w/pain diary.   We discussed subsequent radiofrequency neurotomy if concordant            Leni Hernandze [facility]

## 2018-09-13 NOTE — LETTER
[address] [place] [address] [address] and Sports Medicine    Please Schedule the following with: Dr. Kimball Spine    Date:  09/13/18     Patient: Rosa Marie     YOB: 1945    Patient Home Phone: [phone] (home)    Diagnosis:  Multilevel lumbar DDD M51.36, spondylosis M47.816, facet arthropathy M12.88, facet synovitis M65.9    []LT     [x]RT     []MEGHAN     []Midline    Levels:  L3, L4, L5 #1    []Cervical BLAIR 66790, 71354  [x]L-MBB 77112, 78755  []SI Joint 63282   []C-FACET 39183, 87103, 84964  []L-FACET 37057, 71775  []Interlaminar BLAIR 95767     []HIP 00173    []C-MBB 12266, 53955, 23839  []Transforaminal BLAIR [id number]  []Neurotomy 17364, H651175, 81808    Attending Physician: Vandana Carballo    Injection Schedule for: 9/18/18 AT 1245PM    At: [facility]    First Insurance:MEDICARE                           Pre-cert #:  Second Insurance:                 Pre-cert #:    Comments:  7/31/18 Right L5-S1 TX BLAIR #1--Relief of R sciatica      [x] Blood Thinner:  ASA            [x]Diabetic           []Antibiotic:               []Glaucoma:    [] Current Open Wounds, Lacerations or Sores     Allergies:    Allergies   Allergen Reactions    Latex Rash    Methadone Other (See Comments)     Pt collapsed and was hospitalized for 11 days; organ shutdown    Morphine Rash     Skin peeling off    Wellbutrin [Bupropion Hcl]      Headache; constant pins and needles sensation       Past Medical History:   Diagnosis Date    Anxiety     Back pain     CAD (coronary artery disease)     Cancer (HCC)     skin    CHF (congestive heart failure) (HCC)     [facility] [address]     Depression     Diabetes mellitus ([facility] [address]     Heart disease     Hernia     History of blood transfusion     Hyperlipidemia     Hypertension     Osteoarthritis     Prolonged emergence from general anesthesia     Rheumatic fever     Type II or unspecified type diabetes mellitus without mention of complication, not stated as uncontrolled           Current Outpatient Prescriptions:     HYDROcodone-acetaminophen (NORCO) 5-325 MG per tablet, Take 1 tablet by mouth every 6 hours as needed for Pain for up to 30 days. ., Disp: 120 tablet, Rfl: 0    zolpidem (AMBIEN) 10 MG tablet, Take 1 tablet by mouth nightly as needed for Sleep, Disp: 90 tablet, Rfl: 0

## 2018-09-18 ENCOUNTER — HOSPITAL ENCOUNTER (OUTPATIENT)
Age: 73
Setting detail: OUTPATIENT SURGERY
Discharge: HOME | End: 2018-09-18
Attending: PHYSICAL MEDICINE & REHABILITATION | Admitting: PHYSICAL MEDICINE & REHABILITATION
Payer: MEDICARE

## 2018-09-18 VITALS
HEIGHT: 71 IN | SYSTOLIC BLOOD PRESSURE: 121 MMHG | RESPIRATION RATE: 18 BRPM | WEIGHT: 215 LBS | DIASTOLIC BLOOD PRESSURE: 69 MMHG | TEMPERATURE: 97.6 F | OXYGEN SATURATION: 97 % | HEART RATE: 70 BPM | BODY MASS INDEX: 30.1 KG/M2

## 2018-09-18 LAB
GLUCOSE BLD-MCNC: 101 MG/DL (ref 70–99)
PERFORMED ON: ABNORMAL

## 2018-09-18 PROCEDURE — 2500000003 HC RX 250 WO HCPCS: Performed by: PHYSICAL MEDICINE & REHABILITATION

## 2018-09-18 PROCEDURE — 7100000011 HC PHASE II RECOVERY - ADDTL 15 MIN: Performed by: PHYSICAL MEDICINE & REHABILITATION

## 2018-09-18 PROCEDURE — 2709999900 HC NON-CHARGEABLE SUPPLY: Performed by: PHYSICAL MEDICINE & REHABILITATION

## 2018-09-18 PROCEDURE — 3600000002 HC SURGERY LEVEL 2 BASE: Performed by: PHYSICAL MEDICINE & REHABILITATION

## 2018-09-18 PROCEDURE — 7100000010 HC PHASE II RECOVERY - FIRST 15 MIN: Performed by: PHYSICAL MEDICINE & REHABILITATION

## 2018-09-18 PROCEDURE — 3600000012 HC SURGERY LEVEL 2 ADDTL 15MIN: Performed by: PHYSICAL MEDICINE & REHABILITATION

## 2018-09-18 RX ORDER — BUPIVACAINE HYDROCHLORIDE 7.5 MG/ML
INJECTION, SOLUTION EPIDURAL; RETROBULBAR PRN
Status: DISCONTINUED | OUTPATIENT
Start: 2018-09-18 | End: 2018-09-18 | Stop reason: HOSPADM

## 2018-09-18 ASSESSMENT — PAIN - FUNCTIONAL ASSESSMENT: PAIN_FUNCTIONAL_ASSESSMENT: 0-10

## 2018-09-18 ASSESSMENT — PAIN DESCRIPTION - DESCRIPTORS: DESCRIPTORS: ACHING;STABBING

## 2018-09-18 ASSESSMENT — PAIN SCALES - GENERAL: PAINLEVEL_OUTOF10: 0

## 2018-09-18 NOTE — PROGRESS NOTES
Discharge instructions/pain diary given to pt and verbalized understanding, states pain is at a tolerable level, no numbness or weakness noted, pt ambulated out to car without complications

## 2018-09-26 ENCOUNTER — TELEPHONE (OUTPATIENT)
Dept: ORTHOPEDIC SURGERY | Age: 73
End: 2018-09-26

## 2018-09-26 NOTE — LETTER
[address] [place] [address] [address] and Sports Medicine    Please Schedule the following with: Dr. Vicenta Kee    Date:  09/26/18     Patient: Salomon Torres     YOB: 1945    Patient Home Phone: [phone] (home)    Diagnosis:  Lumbar facet arthropathy M12.88, facet synovitis M65.9, lumbar spondylosis M47.816    []LT     []RT     [x]MEGHAN     []Midline    Levels: L3 L4 L5      []Cervical BLAIR 71768, 08498  []L-MBB [id number], 61867  []SI Joint 83530   []C-FACET 05469, 75087, 25272  []L-FACET [id number], [id number]  []Interlaminar BLAIR 65203     []HIP 19131    []C-MBB 73194, [id number], [id number]  []Transforaminal BLAIR B408954  [x]Neurotomy [id number], R2185254, 40340    Attending Physician: Sravanthi Huynh    Injection Schedule for: At: [facility]    First Insurance:  MEDICARE                          Pre-cert #:  Second Insurance:                 Pre-cert #:    Comments:IV sedation    8 Right L5-S1 TX BLAIR #1  9/18/18 Bilateral L3, L4, and L5 MBB #1-DX RELIEF       [x] Blood Thinner: ASA                [x]Diabetic           []Antibiotic:               []Glaucoma:    [] Current Open Wounds, Lacerations or Sores     Allergies:    Allergies   Allergen Reactions    Latex Rash    Methadone Other (See Comments)     Pt collapsed and was hospitalized for 11 days; organ shutdown    Morphine Rash     Skin peeling off    Wellbutrin [Bupropion Hcl]      Headache; constant pins and needles sensation       Past Medical History:   Diagnosis Date    Anxiety     Back pain     CAD (coronary artery disease)     Cancer (HCC)     skin    CHF (congestive heart failure) (HCC)     [facility] [facility]     Depression     Diabetes mellitus [facility] [facility]     Heart disease     Hernia     History of blood transfusion     Hyperlipidemia     Hypertension     Osteoarthritis     Prolonged emergence from general anesthesia     Rheumatic fever     Type II or unspecified type diabetes mellitus without mention of complication, not stated as uncontrolled           Current Outpatient Prescriptions:     HYDROcodone-acetaminophen (NORCO) 5-325 MG per tablet, Take 1 tablet by mouth every 6 hours as needed for Pain for up to 30 days. ., Disp: 120 tablet, Rfl: 0    zolpidem (AMBIEN) 10 MG tablet, Take 1 tablet by mouth nightly as needed for Sleep, Disp: 90 tablet, Rfl: 0    pravastatin (PRAVACHOL) 80 MG tablet, Take 1 tablet by mouth daily, Disp: 90 tablet, Rfl: 3    lisinopril (PRINIVIL;ZESTRIL) 40 MG tablet, Take 1 tablet by mouth daily, Disp: 90 tablet, Rfl: 3    metoprolol succinate (TOPROL XL) 50 MG extended release tablet, Take 1 tablet by mouth daily, Disp: 90 tablet, Rfl: 3    nicotine (NICODERM CQ) 14 MG/24HR, Place 1 patch onto the skin every 24 hours, Disp: 15 patch, Rfl: 0    Multiple Vitamins-Minerals (THERAPEUTIC MULTIVITAMIN-MINERALS) tablet, Take 1 tablet by mouth daily, Disp: , Rfl:     potassium chloride (KLOR-CON) 10 MEQ extended release tablet, TAKE 1 TABLET BY MOUTH DAILY, Disp: 90 tablet, Rfl: 0    omeprazole (PRILOSEC) 20 MG delayed release capsule, TAKE 1 CAPSULE BY MOUTH DAILY, Disp: 90 capsule, Rfl: 0    glimepiride (AMARYL) 4 MG tablet, Take 1 tablet by mouth every morning (before breakfast), Disp: 90 tablet, Rfl: 0    citalopram (CELEXA) 20 MG tablet, TAKE 1 TABLET BY MOUTH DAILY, Disp: 90 tablet, Rfl: 0    zolpidem (AMBIEN) 10 MG tablet, Take by mouth nightly as needed for Sleep. ., Disp: , Rfl:     diclofenac sodium 1 % GEL, Apply 2 g topically 3 times daily, Disp: 3 Tube, Rfl: 1    furosemide (LASIX) 40 MG tablet, Take 1 tablet by mouth daily, Disp: 90 tablet, Rfl: 0    metFORMIN (GLUCOPHAGE) 500 MG tablet, One tab in am, 2 at pm (Patient taking differently: One tab in am, 1 at pm), Disp: 360 tablet, Rfl: 0    clotrimazole-betamethasone (LOTRISONE) 1-0.05 % cream, Apply topically 2 times daily. , Disp: 45 g, Rfl: 0    glucose blood VI test strips (JORGE ALBERTO CONTOUR TEST) strip, Once daily. DX: E11.9, Disp: 100 each, Rfl: 11    nitroGLYCERIN (NITROSTAT) 0.4 MG SL tablet, Place 1 tablet under the tongue every 5 minutes as needed for Chest pain., Disp: 25 tablet, Rfl: 3    albuterol (PROVENTIL HFA;VENTOLIN HFA) 108 (90 BASE) MCG/ACT inhaler, Inhale 2 puffs into the lungs every 6 hours as needed for Wheezing., Disp: 1 Inhaler, Rfl: 2    aspirin 81 MG EC tablet, Take 81 mg by mouth daily.  Indications: stopped for surgery, Disp: , Rfl:

## 2018-09-28 PROBLEM — Z01.810 PREOPERATIVE CARDIOVASCULAR EXAMINATION: Status: RESOLVED | Noted: 2018-08-29 | Resolved: 2018-09-28

## 2018-10-01 ENCOUNTER — TELEPHONE (OUTPATIENT)
Dept: ORTHOPEDIC SURGERY | Age: 73
End: 2018-10-01

## 2018-10-01 NOTE — TELEPHONE ENCOUNTER
DOS  10/8/2018  CPT   58937.50   15160.50  65928   NPR   DX  M12.88    M65.9    M47.816  OP SX AUTH NPR FOR THIS PLAN  BILATERAL   LEVELS   L3  L4  L5   PROCEDURE   RFA NEUROTOMY  DR. Aicha Hernandez: MEDICARE

## 2018-10-08 ENCOUNTER — HOSPITAL ENCOUNTER (OUTPATIENT)
Age: 73
Setting detail: OUTPATIENT SURGERY
Discharge: HOME | End: 2018-10-08
Attending: PHYSICAL MEDICINE & REHABILITATION | Admitting: PHYSICAL MEDICINE & REHABILITATION
Payer: MEDICARE

## 2018-10-08 VITALS
BODY MASS INDEX: 30.1 KG/M2 | HEIGHT: 71 IN | WEIGHT: 215 LBS | DIASTOLIC BLOOD PRESSURE: 81 MMHG | TEMPERATURE: 97.7 F | RESPIRATION RATE: 13 BRPM | HEART RATE: 69 BPM | SYSTOLIC BLOOD PRESSURE: 152 MMHG | OXYGEN SATURATION: 96 %

## 2018-10-08 LAB
GLUCOSE BLD-MCNC: 108 MG/DL (ref 70–99)
PERFORMED ON: ABNORMAL

## 2018-10-08 PROCEDURE — 6360000002 HC RX W HCPCS: Performed by: PHYSICAL MEDICINE & REHABILITATION

## 2018-10-08 PROCEDURE — 2580000003 HC RX 258

## 2018-10-08 PROCEDURE — 7100000010 HC PHASE II RECOVERY - FIRST 15 MIN: Performed by: PHYSICAL MEDICINE & REHABILITATION

## 2018-10-08 PROCEDURE — 3600000012 HC SURGERY LEVEL 2 ADDTL 15MIN: Performed by: PHYSICAL MEDICINE & REHABILITATION

## 2018-10-08 PROCEDURE — 99153 MOD SED SAME PHYS/QHP EA: CPT | Performed by: PHYSICAL MEDICINE & REHABILITATION

## 2018-10-08 PROCEDURE — 2709999900 HC NON-CHARGEABLE SUPPLY: Performed by: PHYSICAL MEDICINE & REHABILITATION

## 2018-10-08 PROCEDURE — 99152 MOD SED SAME PHYS/QHP 5/>YRS: CPT | Performed by: PHYSICAL MEDICINE & REHABILITATION

## 2018-10-08 PROCEDURE — 3600000002 HC SURGERY LEVEL 2 BASE: Performed by: PHYSICAL MEDICINE & REHABILITATION

## 2018-10-08 PROCEDURE — 2500000003 HC RX 250 WO HCPCS

## 2018-10-08 PROCEDURE — 7100000011 HC PHASE II RECOVERY - ADDTL 15 MIN: Performed by: PHYSICAL MEDICINE & REHABILITATION

## 2018-10-08 RX ORDER — SODIUM CHLORIDE, SODIUM LACTATE, POTASSIUM CHLORIDE, CALCIUM CHLORIDE 600; 310; 30; 20 MG/100ML; MG/100ML; MG/100ML; MG/100ML
INJECTION, SOLUTION INTRAVENOUS CONTINUOUS
Status: DISCONTINUED | OUTPATIENT
Start: 2018-10-08 | End: 2018-10-08 | Stop reason: DRUGHIGH

## 2018-10-08 RX ORDER — FENTANYL CITRATE 50 UG/ML
INJECTION, SOLUTION INTRAMUSCULAR; INTRAVENOUS PRN
Status: DISCONTINUED | OUTPATIENT
Start: 2018-10-08 | End: 2018-10-08 | Stop reason: HOSPADM

## 2018-10-08 RX ORDER — FENTANYL CITRATE 50 UG/ML
INJECTION, SOLUTION INTRAMUSCULAR; INTRAVENOUS
Status: DISCONTINUED
Start: 2018-10-08 | End: 2018-10-08 | Stop reason: HOSPADM

## 2018-10-08 RX ORDER — MIDAZOLAM HYDROCHLORIDE 1 MG/ML
INJECTION INTRAMUSCULAR; INTRAVENOUS
Status: DISCONTINUED
Start: 2018-10-08 | End: 2018-10-08 | Stop reason: HOSPADM

## 2018-10-08 RX ORDER — MIDAZOLAM HYDROCHLORIDE 5 MG/ML
INJECTION INTRAMUSCULAR; INTRAVENOUS PRN
Status: DISCONTINUED | OUTPATIENT
Start: 2018-10-08 | End: 2018-10-08 | Stop reason: HOSPADM

## 2018-10-08 RX ORDER — LIDOCAINE HYDROCHLORIDE 10 MG/ML
INJECTION, SOLUTION EPIDURAL; INFILTRATION; INTRACAUDAL; PERINEURAL
Status: COMPLETED
Start: 2018-10-08 | End: 2018-10-08

## 2018-10-08 RX ORDER — SODIUM CHLORIDE, SODIUM LACTATE, POTASSIUM CHLORIDE, CALCIUM CHLORIDE 600; 310; 30; 20 MG/100ML; MG/100ML; MG/100ML; MG/100ML
INJECTION, SOLUTION INTRAVENOUS
Status: COMPLETED
Start: 2018-10-08 | End: 2018-10-08

## 2018-10-08 RX ORDER — SODIUM CHLORIDE, SODIUM LACTATE, POTASSIUM CHLORIDE, CALCIUM CHLORIDE 600; 310; 30; 20 MG/100ML; MG/100ML; MG/100ML; MG/100ML
INJECTION, SOLUTION INTRAVENOUS ONCE
Status: DISCONTINUED | OUTPATIENT
Start: 2018-10-08 | End: 2018-10-08 | Stop reason: HOSPADM

## 2018-10-08 RX ADMIN — LIDOCAINE HYDROCHLORIDE 0.1 ML: 10 INJECTION, SOLUTION EPIDURAL; INFILTRATION; INTRACAUDAL; PERINEURAL at 09:34

## 2018-10-08 RX ADMIN — SODIUM CHLORIDE, SODIUM LACTATE, POTASSIUM CHLORIDE, CALCIUM CHLORIDE: 600; 310; 30; 20 INJECTION, SOLUTION INTRAVENOUS at 09:35

## 2018-10-08 RX ADMIN — SODIUM CHLORIDE, POTASSIUM CHLORIDE, SODIUM LACTATE AND CALCIUM CHLORIDE: 600; 310; 30; 20 INJECTION, SOLUTION INTRAVENOUS at 09:35

## 2018-10-08 ASSESSMENT — PAIN - FUNCTIONAL ASSESSMENT: PAIN_FUNCTIONAL_ASSESSMENT: 0-10

## 2018-10-08 ASSESSMENT — PAIN DESCRIPTION - DESCRIPTORS: DESCRIPTORS: DULL;CONSTANT

## 2018-10-08 NOTE — H&P
Provider, MD   diclofenac sodium 1 % GEL Apply 2 g topically 3 times daily 5/18/18   Yolanda Ordonez MD   furosemide (LASIX) 40 MG tablet Take 1 tablet by mouth daily 3/15/18   Yolanda Ordonez MD   metFORMIN (GLUCOPHAGE) 500 MG tablet One tab in am, 2 at pm  Patient taking differently: One tab in am, 1 at pm 3/15/18   Yolanda Ordonez MD   clotrimazole-betamethasone (LOTRISONE) 1-0.05 % cream Apply topically 2 times daily. 11/15/17   Yolanda Ordonez MD   glucose blood VI test strips (JORGE ALBERTO CONTOUR TEST) strip Once daily. DX:E11.9 12/10/15   Yolanda Ordonez MD   nitroGLYCERIN (NITROSTAT) 0.4 MG SL tablet Place 1 tablet under the tongue every 5 minutes as needed for Chest pain. 4/20/15   Yolanda Ordonez MD   albuterol (PROVENTIL HFA;VENTOLIN HFA) 108 (90 BASE) MCG/ACT inhaler Inhale 2 puffs into the lungs every 6 hours as needed for Wheezing. 4/1/15   Yolanda Ordonez MD   aspirin 81 MG EC tablet Take 81 mg by mouth daily. Indications: stopped for surgery    Historical Provider, MD       Vitals: Stable     PHYSICAL EXAM:  HENT: Airway patent and reviewed  Cardiovascular: Normal rate, regular rhythm, normal heart sounds. Pulmonary/Chest: No wheezes. No rhonchi. No rales. Abdominal: Soft. Bowel sounds are normal. No distension. MALLAMPATI:           []   I. Complete visualization of the soft palate           [x]   II. Complete visualization of the uvula            []   III. Visualization of only the base of the uvula           []   IV. Soft palate is not visible     ASA CLASS:         []   I. Normal, healthy adult           [x]   II.  Mild systemic disease            []   III. Severe systemic disease      Sedation plan:   [x]  Local              []  Minimal                  []  General anesthesia    Patient's condition acceptable for planned procedure/sedation.    Post Procedure Plan   Return to same level of care   ______________________     The risks and benefits as well as

## 2018-10-19 DIAGNOSIS — M54.42 ACUTE MIDLINE LOW BACK PAIN WITH BILATERAL SCIATICA: ICD-10-CM

## 2018-10-19 DIAGNOSIS — M54.41 ACUTE MIDLINE LOW BACK PAIN WITH BILATERAL SCIATICA: ICD-10-CM

## 2018-10-19 RX ORDER — HYDROCODONE BITARTRATE AND ACETAMINOPHEN 5; 325 MG/1; MG/1
1 TABLET ORAL EVERY 6 HOURS PRN
Qty: 120 TABLET | Refills: 0 | Status: SHIPPED | OUTPATIENT
Start: 2018-10-19 | End: 2018-11-18

## 2018-11-09 RX ORDER — CITALOPRAM 20 MG/1
20 TABLET ORAL DAILY
Qty: 90 TABLET | Refills: 0 | Status: SHIPPED | OUTPATIENT
Start: 2018-11-09 | End: 2018-12-04 | Stop reason: SDUPTHER

## 2018-11-09 RX ORDER — POTASSIUM CHLORIDE 750 MG/1
10 TABLET, FILM COATED, EXTENDED RELEASE ORAL DAILY
Qty: 90 TABLET | Refills: 0 | Status: SHIPPED | OUTPATIENT
Start: 2018-11-09 | End: 2019-01-17 | Stop reason: SDUPTHER

## 2018-11-26 DIAGNOSIS — M15.9 PRIMARY OSTEOARTHRITIS INVOLVING MULTIPLE JOINTS: ICD-10-CM

## 2018-11-26 DIAGNOSIS — M15.9 PRIMARY OSTEOARTHRITIS INVOLVING MULTIPLE JOINTS: Primary | ICD-10-CM

## 2018-11-26 RX ORDER — HYDROCODONE BITARTRATE AND ACETAMINOPHEN 5; 325 MG/1; MG/1
TABLET ORAL
Qty: 120 TABLET | Refills: 0 | Status: SHIPPED | OUTPATIENT
Start: 2018-11-26 | End: 2018-11-26 | Stop reason: SDUPTHER

## 2018-11-26 RX ORDER — HYDROCODONE BITARTRATE AND ACETAMINOPHEN 5; 325 MG/1; MG/1
1 TABLET ORAL EVERY 6 HOURS PRN
Qty: 120 TABLET | Refills: 0 | Status: SHIPPED | OUTPATIENT
Start: 2018-11-26 | End: 2018-12-04 | Stop reason: ALTCHOICE

## 2018-11-26 NOTE — TELEPHONE ENCOUNTER
----- Message from Cesar Garcia sent at 11/26/2018  2:51 PM EST -----  Contact: Patient spouse [phone]  Patient needs a refill on HYDROcodone-acetaminophen ([address]) 5-325 MG per tablet sent to [address] [place] [address]. Last seen 9/10/18, next appt 12/4/18.

## 2018-12-04 ENCOUNTER — OFFICE VISIT (OUTPATIENT)
Dept: INTERNAL MEDICINE CLINIC | Age: 73
End: 2018-12-04

## 2018-12-04 VITALS — BODY MASS INDEX: 32.06 KG/M2 | HEIGHT: 71 IN | WEIGHT: 229 LBS

## 2018-12-04 DIAGNOSIS — Z12.11 SCREENING FOR COLORECTAL CANCER: ICD-10-CM

## 2018-12-04 DIAGNOSIS — I71.40 ABDOMINAL AORTIC ANEURYSM (AAA) WITHOUT RUPTURE: ICD-10-CM

## 2018-12-04 DIAGNOSIS — I10 ESSENTIAL HYPERTENSION: ICD-10-CM

## 2018-12-04 DIAGNOSIS — F17.200 TOBACCO USE DISORDER: ICD-10-CM

## 2018-12-04 DIAGNOSIS — F51.01 PRIMARY INSOMNIA: ICD-10-CM

## 2018-12-04 DIAGNOSIS — Z00.00 MEDICARE ANNUAL WELLNESS VISIT, INITIAL: Primary | ICD-10-CM

## 2018-12-04 DIAGNOSIS — Z12.12 SCREENING FOR COLORECTAL CANCER: ICD-10-CM

## 2018-12-04 DIAGNOSIS — Z13.6 SCREENING FOR CARDIOVASCULAR CONDITION: ICD-10-CM

## 2018-12-04 DIAGNOSIS — M15.9 PRIMARY OSTEOARTHRITIS INVOLVING MULTIPLE JOINTS: ICD-10-CM

## 2018-12-04 DIAGNOSIS — Z00.00 ROUTINE GENERAL MEDICAL EXAMINATION AT A HEALTH CARE FACILITY: ICD-10-CM

## 2018-12-04 DIAGNOSIS — Z00.00 MEDICARE ANNUAL WELLNESS VISIT, INITIAL: ICD-10-CM

## 2018-12-04 DIAGNOSIS — L89.310: ICD-10-CM

## 2018-12-04 DIAGNOSIS — I25.10 CORONARY ARTERY DISEASE INVOLVING NATIVE CORONARY ARTERY OF NATIVE HEART WITHOUT ANGINA PECTORIS: ICD-10-CM

## 2018-12-04 DIAGNOSIS — Z23 NEED FOR INFLUENZA VACCINATION: ICD-10-CM

## 2018-12-04 DIAGNOSIS — Z87.891 PERSONAL HISTORY OF TOBACCO USE: ICD-10-CM

## 2018-12-04 DIAGNOSIS — E11.8 TYPE 2 DIABETES MELLITUS WITH COMPLICATION, WITHOUT LONG-TERM CURRENT USE OF INSULIN (HCC): ICD-10-CM

## 2018-12-04 DIAGNOSIS — E78.2 MIXED HYPERLIPIDEMIA: ICD-10-CM

## 2018-12-04 LAB
A/G RATIO: 2.8 (ref 1.1–2.2)
ALBUMIN SERPL-MCNC: 4.7 G/DL (ref 3.4–5)
ALP BLD-CCNC: 87 U/L (ref 40–129)
ALT SERPL-CCNC: 10 U/L (ref 10–40)
ANION GAP SERPL CALCULATED.3IONS-SCNC: 15 MMOL/L (ref 3–16)
AST SERPL-CCNC: 13 U/L (ref 15–37)
BASOPHILS ABSOLUTE: 0 K/UL (ref 0–0.2)
BASOPHILS RELATIVE PERCENT: 0.5 %
BILIRUB SERPL-MCNC: 0.5 MG/DL (ref 0–1)
BILIRUBIN, POC: NORMAL
BLOOD URINE, POC: NORMAL
BUN BLDV-MCNC: 11 MG/DL (ref 7–20)
CALCIUM SERPL-MCNC: 9.7 MG/DL (ref 8.3–10.6)
CHLORIDE BLD-SCNC: 99 MMOL/L (ref 99–110)
CHOLESTEROL, TOTAL: 147 MG/DL (ref 0–199)
CLARITY, POC: NORMAL
CO2: 25 MMOL/L (ref 21–32)
COLOR, POC: NORMAL
CREAT SERPL-MCNC: 0.7 MG/DL (ref 0.8–1.3)
CREATININE URINE: 63 MG/DL (ref 39–259)
EOSINOPHILS ABSOLUTE: 0.2 K/UL (ref 0–0.6)
EOSINOPHILS RELATIVE PERCENT: 2 %
GFR AFRICAN AMERICAN: >60
GFR NON-AFRICAN AMERICAN: >60
GLOBULIN: 1.7 G/DL
GLUCOSE BLD-MCNC: 130 MG/DL (ref 70–99)
GLUCOSE URINE, POC: NORMAL
HCT VFR BLD CALC: 41.5 % (ref 40.5–52.5)
HDLC SERPL-MCNC: 32 MG/DL (ref 40–60)
HEMOGLOBIN: 13.7 G/DL (ref 13.5–17.5)
KETONES, POC: NORMAL
LDL CHOLESTEROL CALCULATED: 72 MG/DL
LEUKOCYTE EST, POC: NORMAL
LYMPHOCYTES ABSOLUTE: 1.8 K/UL (ref 1–5.1)
LYMPHOCYTES RELATIVE PERCENT: 22.9 %
MCH RBC QN AUTO: 29 PG (ref 26–34)
MCHC RBC AUTO-ENTMCNC: 33 G/DL (ref 31–36)
MCV RBC AUTO: 88 FL (ref 80–100)
MICROALBUMIN UR-MCNC: 1.8 MG/DL
MICROALBUMIN/CREAT UR-RTO: 28.6 MG/G (ref 0–30)
MONOCYTES ABSOLUTE: 0.5 K/UL (ref 0–1.3)
MONOCYTES RELATIVE PERCENT: 7 %
NEUTROPHILS ABSOLUTE: 5.3 K/UL (ref 1.7–7.7)
NEUTROPHILS RELATIVE PERCENT: 67.6 %
NITRITE, POC: NORMAL
PDW BLD-RTO: 14.1 % (ref 12.4–15.4)
PH, POC: NORMAL
PLATELET # BLD: 255 K/UL (ref 135–450)
PMV BLD AUTO: 8.1 FL (ref 5–10.5)
POTASSIUM SERPL-SCNC: 4.7 MMOL/L (ref 3.5–5.1)
PROSTATE SPECIFIC ANTIGEN: 0.72 NG/ML (ref 0–4)
PROTEIN, POC: NORMAL
RBC # BLD: 4.71 M/UL (ref 4.2–5.9)
SODIUM BLD-SCNC: 139 MMOL/L (ref 136–145)
SPECIFIC GRAVITY, POC: NORMAL
TOTAL PROTEIN: 6.4 G/DL (ref 6.4–8.2)
TRIGL SERPL-MCNC: 213 MG/DL (ref 0–150)
TSH REFLEX: 0.33 UIU/ML (ref 0.27–4.2)
URIC ACID, SERUM: 5.7 MG/DL (ref 3.5–7.2)
UROBILINOGEN, POC: NORMAL
VLDLC SERPL CALC-MCNC: 43 MG/DL
WBC # BLD: 7.8 K/UL (ref 4–11)

## 2018-12-04 PROCEDURE — G8598 ASA/ANTIPLAT THER USED: HCPCS | Performed by: INTERNAL MEDICINE

## 2018-12-04 PROCEDURE — 3044F HG A1C LEVEL LT 7.0%: CPT | Performed by: INTERNAL MEDICINE

## 2018-12-04 PROCEDURE — G0446 INTENS BEHAVE THER CARDIO DX: HCPCS | Performed by: INTERNAL MEDICINE

## 2018-12-04 PROCEDURE — G0008 ADMIN INFLUENZA VIRUS VAC: HCPCS | Performed by: INTERNAL MEDICINE

## 2018-12-04 PROCEDURE — 4040F PNEUMOC VAC/ADMIN/RCVD: CPT | Performed by: INTERNAL MEDICINE

## 2018-12-04 PROCEDURE — 90662 IIV NO PRSV INCREASED AG IM: CPT | Performed by: INTERNAL MEDICINE

## 2018-12-04 PROCEDURE — G0438 PPPS, INITIAL VISIT: HCPCS | Performed by: INTERNAL MEDICINE

## 2018-12-04 PROCEDURE — G8482 FLU IMMUNIZE ORDER/ADMIN: HCPCS | Performed by: INTERNAL MEDICINE

## 2018-12-04 PROCEDURE — 81002 URINALYSIS NONAUTO W/O SCOPE: CPT | Performed by: INTERNAL MEDICINE

## 2018-12-04 RX ORDER — ZOLPIDEM TARTRATE 10 MG/1
10 TABLET ORAL NIGHTLY PRN
Qty: 90 TABLET | Refills: 0 | Status: SHIPPED | OUTPATIENT
Start: 2018-12-04 | End: 2019-03-01 | Stop reason: SDUPTHER

## 2018-12-04 RX ORDER — CITALOPRAM 20 MG/1
20 TABLET ORAL DAILY
Qty: 90 TABLET | Refills: 0 | Status: SHIPPED | OUTPATIENT
Start: 2018-12-04 | End: 2019-02-11 | Stop reason: SDUPTHER

## 2018-12-04 RX ORDER — FUROSEMIDE 40 MG/1
40 TABLET ORAL DAILY
Qty: 90 TABLET | Refills: 1 | Status: SHIPPED | OUTPATIENT
Start: 2018-12-04 | End: 2019-04-10 | Stop reason: SDUPTHER

## 2018-12-04 ASSESSMENT — PATIENT HEALTH QUESTIONNAIRE - PHQ9
SUM OF ALL RESPONSES TO PHQ QUESTIONS 1-9: 0
SUM OF ALL RESPONSES TO PHQ QUESTIONS 1-9: 0
SUM OF ALL RESPONSES TO PHQ QUESTIONS 1-9: 1
SUM OF ALL RESPONSES TO PHQ QUESTIONS 1-9: 1

## 2018-12-04 ASSESSMENT — ANXIETY QUESTIONNAIRES
GAD7 TOTAL SCORE: 0
GAD7 TOTAL SCORE: 0

## 2018-12-04 ASSESSMENT — LIFESTYLE VARIABLES: HOW OFTEN DO YOU HAVE A DRINK CONTAINING ALCOHOL: 0

## 2018-12-04 NOTE — PROGRESS NOTES
Medicare Annual Wellness Visit  Name: Alex Treadwell Date: 2018   MRN: [id number] Sex: Male   Age: 72 y.o. Ethnicity: Non-/Non    : 1945 Race: [place] is here for Medicare AWV    Screenings for behavioral, psychosocial and functional/safety risks, and cognitive dysfunction are all negative except as indicated below. These results, as well as other patient data from the [address] form, are documented in Flowsheets linked to this Encounter. Since last time, pt activity is limited with back issues. Sp epidural     Allergies   Allergen Reactions    Latex Rash    Methadone Other (See Comments)     Pt collapsed and was hospitalized for 11 days; organ shutdown    Morphine Rash     Skin peeling off    Wellbutrin [Bupropion Hcl]      Headache; constant pins and needles sensation       Prior to Visit Medications    Medication Sig Taking? Authorizing Provider   furosemide (LASIX) 40 MG tablet Take 1 tablet by mouth daily Yes Matty Adhikari MD   metFORMIN (GLUCOPHAGE) 500 MG tablet One tab in am, 1 at pm Yes Matty Adhikari MD   HYDROcodone-acetaminophen (NORCO) 5-325 MG per tablet Take 1 tablet by mouth every 6 hours as needed for Pain for up to 30 days. Manoj Horn MD   citalopram (CELEXA) 20 MG tablet TAKE 1 TABLET BY MOUTH DAILY  Matty Adhikari MD   potassium chloride (KLOR-CON) 10 MEQ extended release tablet TAKE 1 TABLET BY MOUTH DAILY  Matty Adhikari MD   zolpidem (AMBIEN) 10 MG tablet Take 1 tablet by mouth nightly as needed for Sleep  Matty Adhikari MD   pravastatin (PRAVACHOL) 80 MG tablet Take 1 tablet by mouth daily  David Walker MD   lisinopril (PRINIVIL;ZESTRIL) 40 MG tablet Take 1 tablet by mouth daily  David Walker MD   metoprolol succinate (TOPROL XL) 50 MG extended release tablet Take 1 tablet by mouth daily  David Walker MD   Multiple Vitamins-Minerals (THERAPEUTIC time    Hearing/Vision:  Hearing/Vision  Do you or your family notice any trouble with your hearing?: No  Do you have difficulty driving, watching TV, or doing any of your daily activities because of your eyesight?: No  Have you had an eye exam within the past year?: (!) No  Hearing/Vision Interventions:  · Vision concerns:  patient encouraged to make appointment with his/her eye specialist    Personalized Preventive Plan   Current Health Maintenance Status  Immunization History   Administered Date(s) Administered    Influenza Virus Vaccine 10/01/2013, 12/11/2014    Influenza, High Dose (Fluzone 65 yrs and older) 10/24/2017    Pneumococcal 13-valent Conjugate (Yuezelc48) 05/12/2016    Pneumococcal Polysaccharide (Hpaatxblb25) 10/15/2011    Tdap (Boostrix, Adacel) 07/31/2012        Health Maintenance   Topic Date Due    Diabetic retinal exam  01/03/1957    Shingles Vaccine (1 of 2 - 2 Dose Series) 01/03/1997    Low dose CT lung screening  05/06/2015    Pneumococcal low/med risk (2 of 2 - PPSV23) 05/12/2017    Colon Cancer Screen FIT/FOBT  08/21/2018    Flu vaccine (1) 09/01/2018    Diabetic foot exam  03/15/2019    Diabetic microalbuminuria test  03/15/2019    Lipid screen  03/15/2019    A1C test (Diabetic or Prediabetic)  08/30/2019    Potassium monitoring  08/30/2019    Creatinine monitoring  08/30/2019    DTaP/Tdap/Td vaccine (2 - Td) 07/31/2022    Hepatitis C screen  Completed     Recommendations for Preventive Services Due: see orders and patient instructions/AVS.  .   Recommended screening schedule for the next 5-10 years is provided to the patient in written form: see Patient Instructions/AVS.

## 2018-12-04 NOTE — PATIENT INSTRUCTIONS
Learning About Healthy Weight  What is a healthy weight? A healthy weight is the weight at which you feel good about yourself and have energy for work and play. It's also one that lowers your risk for health problems. What can you do to stay at a healthy weight? It can be hard to stay at a healthy weight, especially when fast food, vending-machine snacks, and processed foods are so easy to find. And with your busy lifestyle, activity may be low on your list of things to do. But staying at a healthy weight may be easier than you think. Here are some dos and don'ts for staying at a healthy weight:  Do eat healthy foods  The kinds of foods you eat have a big impact on both your weight and your health. Reaching and staying at a healthy weight is not about going on a diet. It's about making healthier food choices every day and changing your diet for good. Healthy eating means eating a variety of foods so that you get all the nutrients you need. Your body needs protein, carbohydrate, and fats for energy. They keep your heart beating, your brain active, and your muscles working. On most days, try to eat from each food group. This means eating a variety of:  · Whole grains, such as whole wheat breads and pastas. · Fruits and vegetables. · Dairy products, such as low-fat milk, yogurt, and cheese. · Lean proteins, such as all types of fish, chicken without the skin, and beans. Don't have too much or too little of one thing. All foods, if eaten in moderation, can be part of healthy eating. Even sweets can be okay. If your favorite foods are high in fat, salt, sugar, or calories, limit how often you eat them. Eat smaller servings, or look for healthy substitutes. Do watch what you eat  Many people eat more than their bodies need. Part of staying at a healthy weight means learning how much food you really need from day to day and not eating more than that.  Even with healthy foods, eating too much can make you stop dieting, you also may overeat to make up for what you missed. Most people who diet end up gaining back the pounds they lost--and more. Remember that healthy bodies come in lots of shapes and sizes. Everyone can get healthier by eating better and being more active. Where can you learn more? Go to https://chpepiceweb.Wishdates. org and sign in to your [employer] account. Enter 960 [id number] in the AVIA box to learn more about \"Learning About Healthy Weight. \"     If you do not have an account, please click on the \"Sign Up Now\" link. Current as of: September 10, 2017  Content Version: 11.8  © 3840-0768 [employer]. Care instructions adapted under license by [facility] ([facility]). If you have questions about a medical condition or this instruction, always ask your healthcare professional. [place] [address] any warranty or liability for your use of this information. Abnormal Weight Gain: Care Instructions  Your Care Instructions    There are two types of weight gain--normal and abnormal. Normal weight gain is usually caused by eating too much or exercising too little. It can also happen as you get older. But abnormal weight gain has other causes. It can be caused by a problem with your thyroid gland, called hypothyroidism. Or it can be caused by a problem with your adrenal glands, called Cushing's syndrome. Or your body could be holding too much fluid because of kidney, liver, or heart problems. In some cases, a medicine you take can cause you to gain weight. You can work with your doctor to find out the cause of your weight gain. You will probably need tests to do this. Follow-up care is a key part of your treatment and safety. Be sure to make and go to all appointments, and call your doctor if you are having problems. It's also a good idea to know your test results and keep a list of the medicines you take. How can you care for yourself at home?   · Weigh yourself at the same time every day. It's best to do it first thing in the morning after you empty your bladder. Be sure to always wear the same amount of clothing. · Write down any changes in your weight and the possible causes. Discuss these with your doctor. · Your doctor may want you to change your diet and exercise habits. A good way to lose weight is to reduce calories and increase exercise. · Walking is an easy way to get exercise. Try to walk a little longer every day. You also may want to swim, bike, or do other activities. · Ask your doctor if you should see a dietitian. This is a person who can help you plan meals that work best for your lifestyle. · If your doctor prescribed medicines, take them exactly as prescribed. Call your doctor if you think you are having a problem with your medicine. You will get more details on the specific medicines your doctor prescribes. When should you call for help? Watch closely for changes in your health, and be sure to contact your doctor if:    · You do not get better as expected.     · You continue to gain weight. Where can you learn more? Go to https://Puuilo.enModus. org and sign in to your [employer] account. Enter A175 in the [employer] box to learn more about \"Abnormal Weight Gain: Care Instructions. \"     If you do not have an account, please click on the \"Sign Up Now\" link. Current as of: June 26, 2018  Content Version: 11.8  © 0429-8218 [employer]. Care instructions adapted under license by [facility] ([facility]). If you have questions about a medical condition or this instruction, always ask your healthcare professional. [address] any warranty or liability for your use of this information. Deciding About Using Medicines To Quit Smoking  Deciding About Using Medicines To Quit Smoking  What are the medicines you can use? Your doctor may prescribe varenicline (Chantix) or bupropion (Zyban). These medicines can help you cope with cravings for tobacco. They are pills that don't contain nicotine. You also can use nicotine replacement products. These do contain nicotine. There are many types. · Gum and lozenges slowly release nicotine into your mouth. · Patches stick to your skin. They slowly release nicotine into your bloodstream.  · An inhaler has a carrion that contains nicotine. You breathe in a puff of nicotine vapor through your mouth and throat. · Nasal spray releases a mist that contains nicotine. What are key points about this decision? · Using medicines can double your chances of quitting smoking. They can ease cravings and withdrawal symptoms. · Getting counseling along with using medicine can raise your chances of quitting even more. · If you smoke fewer than 5 cigarettes a day, you may not need medicines to help you quit smoking. · These medicines have less nicotine than cigarettes. And by itself, nicotine is not nearly as harmful as smoking. The tars, carbon monoxide, and other toxic chemicals in tobacco cause the harmful effects. · The side effects of nicotine replacement products depend on the type of product. For example, a patch can make your skin red and itchy. Medicines in pill form can make you sick to your stomach. They can also cause dry mouth and trouble sleeping. For most people, the side effects are not bad enough to make them stop using the products. Why might you choose to use medicines to quit smoking? · You have tried on your own to stop smoking, but you were not able to stop. · You smoke more than 5 cigarettes a day. · You want to increase your chances of quitting smoking. · You want to reduce your cravings and withdrawal symptoms. · You feel the benefits of medicine outweigh the side effects. Why might you choose not to use medicine? · You want to try quitting on your own by stopping all at once (\"cold turkey\").   · You want to cut back slowly on the number

## 2018-12-05 LAB
ESTIMATED AVERAGE GLUCOSE: 148.5 MG/DL
HBA1C MFR BLD: 6.8 %

## 2018-12-07 ENCOUNTER — TELEPHONE (OUTPATIENT)
Dept: INTERNAL MEDICINE CLINIC | Age: 73
End: 2018-12-07

## 2018-12-07 RX ORDER — GLIMEPIRIDE 4 MG/1
TABLET ORAL
Qty: 90 TABLET | Refills: 0 | Status: SHIPPED | OUTPATIENT
Start: 2018-12-07 | End: 2019-03-01 | Stop reason: SDUPTHER

## 2018-12-13 ENCOUNTER — OFFICE VISIT (OUTPATIENT)
Dept: ORTHOPEDIC SURGERY | Age: 73
End: 2018-12-13
Payer: MEDICARE

## 2018-12-13 VITALS
HEIGHT: 71 IN | SYSTOLIC BLOOD PRESSURE: 143 MMHG | DIASTOLIC BLOOD PRESSURE: 76 MMHG | HEART RATE: 75 BPM | WEIGHT: 229.06 LBS | BODY MASS INDEX: 32.07 KG/M2

## 2018-12-13 DIAGNOSIS — M51.36 DDD (DEGENERATIVE DISC DISEASE), LUMBAR: Primary | ICD-10-CM

## 2018-12-13 DIAGNOSIS — M47.816 LUMBAR FACET ARTHROPATHY: ICD-10-CM

## 2018-12-13 PROCEDURE — 1123F ACP DISCUSS/DSCN MKR DOCD: CPT | Performed by: PHYSICAL MEDICINE & REHABILITATION

## 2018-12-13 PROCEDURE — G8598 ASA/ANTIPLAT THER USED: HCPCS | Performed by: PHYSICAL MEDICINE & REHABILITATION

## 2018-12-13 PROCEDURE — 99214 OFFICE O/P EST MOD 30 MIN: CPT | Performed by: PHYSICAL MEDICINE & REHABILITATION

## 2018-12-13 PROCEDURE — G8427 DOCREV CUR MEDS BY ELIG CLIN: HCPCS | Performed by: PHYSICAL MEDICINE & REHABILITATION

## 2018-12-13 PROCEDURE — 1101F PT FALLS ASSESS-DOCD LE1/YR: CPT | Performed by: PHYSICAL MEDICINE & REHABILITATION

## 2018-12-13 PROCEDURE — G8482 FLU IMMUNIZE ORDER/ADMIN: HCPCS | Performed by: PHYSICAL MEDICINE & REHABILITATION

## 2018-12-13 PROCEDURE — 3017F COLORECTAL CA SCREEN DOC REV: CPT | Performed by: PHYSICAL MEDICINE & REHABILITATION

## 2018-12-13 PROCEDURE — 4040F PNEUMOC VAC/ADMIN/RCVD: CPT | Performed by: PHYSICAL MEDICINE & REHABILITATION

## 2018-12-13 PROCEDURE — G8417 CALC BMI ABV UP PARAM F/U: HCPCS | Performed by: PHYSICAL MEDICINE & REHABILITATION

## 2018-12-13 PROCEDURE — 4004F PT TOBACCO SCREEN RCVD TLK: CPT | Performed by: PHYSICAL MEDICINE & REHABILITATION

## 2018-12-13 NOTE — LETTER
 Type II or unspecified type diabetes mellitus without mention of complication, not stated as uncontrolled           Current Outpatient Prescriptions:     glimepiride (AMARYL) 4 MG tablet, TAKE 1 TABLET BY MOUTH EVERY MORNING BEFORE BREAKFAST, Disp: 90 tablet, Rfl: 0    furosemide (LASIX) 40 MG tablet, Take 1 tablet by mouth daily, Disp: 90 tablet, Rfl: 1    metFORMIN (GLUCOPHAGE) 500 MG tablet, One tab in am, 1 at pm, Disp: 180 tablet, Rfl: 1    zolpidem (AMBIEN) 10 MG tablet, Take 1 tablet by mouth nightly as needed for Sleep for up to 90 days. ., Disp: 90 tablet, Rfl: 0    citalopram (CELEXA) 20 MG tablet, Take 1 tablet by mouth daily, Disp: 90 tablet, Rfl: 0    potassium chloride (KLOR-CON) 10 MEQ extended release tablet, TAKE 1 TABLET BY MOUTH DAILY, Disp: 90 tablet, Rfl: 0    pravastatin (PRAVACHOL) 80 MG tablet, Take 1 tablet by mouth daily, Disp: 90 tablet, Rfl: 3    lisinopril (PRINIVIL;ZESTRIL) 40 MG tablet, Take 1 tablet by mouth daily, Disp: 90 tablet, Rfl: 3    metoprolol succinate (TOPROL XL) 50 MG extended release tablet, Take 1 tablet by mouth daily, Disp: 90 tablet, Rfl: 3    Multiple Vitamins-Minerals (THERAPEUTIC MULTIVITAMIN-MINERALS) tablet, Take 1 tablet by mouth daily, Disp: , Rfl:     omeprazole (PRILOSEC) 20 MG delayed release capsule, TAKE 1 CAPSULE BY MOUTH DAILY, Disp: 90 capsule, Rfl: 0    diclofenac sodium 1 % GEL, Apply 2 g topically 3 times daily, Disp: 3 Tube, Rfl: 1    clotrimazole-betamethasone (LOTRISONE) 1-0.05 % cream, Apply topically 2 times daily. , Disp: 45 g, Rfl: 0    glucose blood VI test strips (JORGE ALBERTO CONTOUR TEST) strip, Once daily.   DX:E11.9, Disp: 100 each, Rfl: 11    nitroGLYCERIN (NITROSTAT) 0.4 MG SL tablet, Place 1 tablet under the tongue every 5 minutes as needed for Chest pain., Disp: 25 tablet, Rfl: 3    albuterol (PROVENTIL HFA;VENTOLIN HFA) 108 (90 BASE) MCG/ACT inhaler, Inhale 2 puffs into the lungs every 6 hours as needed for Wheezing., Disp:

## 2018-12-13 NOTE — PROGRESS NOTES
There are no rashes, ulcerations or lesions. Strength and tone are normal.    Diagnostic Testing:     December 2018 hemoglobin A1c 6.8; normal CBC and comprehensive panel    Lumbar MRI scan report independently reviewed 6/18/2018 showing 6 lumbar vertebral bodies with multilevel DDD/spondylosis, multilevel facet arthropathy/synovitis. He has Modic changes with moderate to severe narrowing L2-3 followed by L3 4      Impression:    Lumbar stenosis and chronic axial back pain ×1  Secondary myofascial     DM, dep, CHF, CAD      Plan: Overall not improved    ) Pain is predominantly axial, may be from his stenosis a little higher L2-3 and L3 4.   I suggest therapeutic bilateral L3 4 transforaminal epidurals, #2    He has more residual right myofascial pain after the epidural is improving we can consider trigger point injections on the right side with Esther Palm M.D.

## 2018-12-14 ENCOUNTER — TELEPHONE (OUTPATIENT)
Dept: ORTHOPEDIC SURGERY | Age: 73
End: 2018-12-14

## 2018-12-17 ENCOUNTER — HOSPITAL ENCOUNTER (OUTPATIENT)
Age: 73
Setting detail: OUTPATIENT SURGERY
Discharge: HOME HEALTH | End: 2018-12-17
Attending: PHYSICAL MEDICINE & REHABILITATION | Admitting: PHYSICAL MEDICINE & REHABILITATION
Payer: MEDICARE

## 2018-12-17 VITALS
DIASTOLIC BLOOD PRESSURE: 98 MMHG | HEART RATE: 72 BPM | TEMPERATURE: 98.1 F | RESPIRATION RATE: 16 BRPM | BODY MASS INDEX: 31.5 KG/M2 | SYSTOLIC BLOOD PRESSURE: 145 MMHG | HEIGHT: 71 IN | OXYGEN SATURATION: 98 % | WEIGHT: 225 LBS

## 2018-12-17 LAB
GLUCOSE BLD-MCNC: 126 MG/DL (ref 70–99)
PERFORMED ON: ABNORMAL

## 2018-12-17 PROCEDURE — 3600000002 HC SURGERY LEVEL 2 BASE: Performed by: PHYSICAL MEDICINE & REHABILITATION

## 2018-12-17 PROCEDURE — 7100000010 HC PHASE II RECOVERY - FIRST 15 MIN: Performed by: PHYSICAL MEDICINE & REHABILITATION

## 2018-12-17 PROCEDURE — 7100000011 HC PHASE II RECOVERY - ADDTL 15 MIN: Performed by: PHYSICAL MEDICINE & REHABILITATION

## 2018-12-17 PROCEDURE — 2709999900 HC NON-CHARGEABLE SUPPLY: Performed by: PHYSICAL MEDICINE & REHABILITATION

## 2018-12-17 PROCEDURE — 6360000004 HC RX CONTRAST MEDICATION: Performed by: PHYSICAL MEDICINE & REHABILITATION

## 2018-12-17 PROCEDURE — 3600000012 HC SURGERY LEVEL 2 ADDTL 15MIN: Performed by: PHYSICAL MEDICINE & REHABILITATION

## 2018-12-17 PROCEDURE — 6360000002 HC RX W HCPCS: Performed by: PHYSICAL MEDICINE & REHABILITATION

## 2018-12-17 PROCEDURE — 2500000003 HC RX 250 WO HCPCS: Performed by: PHYSICAL MEDICINE & REHABILITATION

## 2018-12-17 RX ORDER — LIDOCAINE HYDROCHLORIDE 10 MG/ML
INJECTION, SOLUTION EPIDURAL; INFILTRATION; INTRACAUDAL; PERINEURAL PRN
Status: DISCONTINUED | OUTPATIENT
Start: 2018-12-17 | End: 2018-12-17 | Stop reason: HOSPADM

## 2018-12-17 ASSESSMENT — PAIN DESCRIPTION - DESCRIPTORS: DESCRIPTORS: ACHING;SHARP

## 2018-12-17 ASSESSMENT — ACTIVITIES OF DAILY LIVING (ADL): EFFECT OF PAIN ON DAILY ACTIVITIES: ANY ACTIVITY

## 2018-12-17 ASSESSMENT — PAIN - FUNCTIONAL ASSESSMENT: PAIN_FUNCTIONAL_ASSESSMENT: 0-10

## 2018-12-17 NOTE — H&P
daily. 11/15/17   Christie John MD   glucose blood VI test strips (JORGE ALBERTO CONTOUR TEST) strip Once daily. DX:E11.9 12/10/15   Christie John MD   nitroGLYCERIN (NITROSTAT) 0.4 MG SL tablet Place 1 tablet under the tongue every 5 minutes as needed for Chest pain. 4/20/15   Christie John MD   albuterol (PROVENTIL HFA;VENTOLIN HFA) 108 (90 BASE) MCG/ACT inhaler Inhale 2 puffs into the lungs every 6 hours as needed for Wheezing. 4/1/15   Christie John MD   aspirin 81 MG EC tablet Take 81 mg by mouth daily. Indications: stopped for surgery    Historical Provider, MD       Vitals: Stable     PHYSICAL EXAM:  HENT: Airway patent and reviewed  Cardiovascular: Normal rate, regular rhythm, normal heart sounds. Pulmonary/Chest: No wheezes. No rhonchi. No rales. Abdominal: Soft. Bowel sounds are normal. No distension. MALLAMPATI:           []   I. Complete visualization of the soft palate           [x]   II. Complete visualization of the uvula            []   III. Visualization of only the base of the uvula           []   IV. Soft palate is not visible     ASA CLASS:         []   I. Normal, healthy adult           [x]   II.  Mild systemic disease            []   III. Severe systemic disease      Sedation plan:   [x]  Local              []  Minimal                  []  General anesthesia    Patient's condition acceptable for planned procedure/sedation. Post Procedure Plan   Return to same level of care   ______________________     The risks and benefits as well as alternatives to the procedure have been discussed with the patient and or family. The patient and or next of kin understands and agrees to proceed.     Bonnie Law M.D.

## 2019-01-02 ENCOUNTER — OFFICE VISIT (OUTPATIENT)
Dept: INTERNAL MEDICINE CLINIC | Age: 74
End: 2019-01-02

## 2019-01-02 VITALS
HEART RATE: 80 BPM | SYSTOLIC BLOOD PRESSURE: 160 MMHG | BODY MASS INDEX: 30.94 KG/M2 | HEIGHT: 71 IN | WEIGHT: 221 LBS | TEMPERATURE: 97.7 F | DIASTOLIC BLOOD PRESSURE: 80 MMHG

## 2019-01-02 DIAGNOSIS — M54.50 CHRONIC MIDLINE LOW BACK PAIN WITHOUT SCIATICA: Primary | ICD-10-CM

## 2019-01-02 DIAGNOSIS — J20.9 ACUTE BRONCHITIS, UNSPECIFIED ORGANISM: Primary | ICD-10-CM

## 2019-01-02 DIAGNOSIS — G89.29 CHRONIC MIDLINE LOW BACK PAIN WITHOUT SCIATICA: Primary | ICD-10-CM

## 2019-01-02 PROCEDURE — 1101F PT FALLS ASSESS-DOCD LE1/YR: CPT | Performed by: PHYSICIAN ASSISTANT

## 2019-01-02 PROCEDURE — 1123F ACP DISCUSS/DSCN MKR DOCD: CPT | Performed by: PHYSICIAN ASSISTANT

## 2019-01-02 PROCEDURE — 3017F COLORECTAL CA SCREEN DOC REV: CPT | Performed by: PHYSICIAN ASSISTANT

## 2019-01-02 PROCEDURE — G8427 DOCREV CUR MEDS BY ELIG CLIN: HCPCS | Performed by: PHYSICIAN ASSISTANT

## 2019-01-02 PROCEDURE — G8598 ASA/ANTIPLAT THER USED: HCPCS | Performed by: PHYSICIAN ASSISTANT

## 2019-01-02 PROCEDURE — G8417 CALC BMI ABV UP PARAM F/U: HCPCS | Performed by: PHYSICIAN ASSISTANT

## 2019-01-02 PROCEDURE — G8482 FLU IMMUNIZE ORDER/ADMIN: HCPCS | Performed by: PHYSICIAN ASSISTANT

## 2019-01-02 PROCEDURE — 4004F PT TOBACCO SCREEN RCVD TLK: CPT | Performed by: PHYSICIAN ASSISTANT

## 2019-01-02 PROCEDURE — 99213 OFFICE O/P EST LOW 20 MIN: CPT | Performed by: PHYSICIAN ASSISTANT

## 2019-01-02 PROCEDURE — 4040F PNEUMOC VAC/ADMIN/RCVD: CPT | Performed by: PHYSICIAN ASSISTANT

## 2019-01-02 RX ORDER — HYDROCODONE BITARTRATE AND ACETAMINOPHEN 5; 325 MG/1; MG/1
1 TABLET ORAL EVERY 6 HOURS PRN
COMMUNITY
End: 2019-01-02 | Stop reason: SDUPTHER

## 2019-01-02 RX ORDER — DOXYCYCLINE HYCLATE 100 MG
100 TABLET ORAL 2 TIMES DAILY
Qty: 20 TABLET | Refills: 0 | Status: SHIPPED | OUTPATIENT
Start: 2019-01-02 | End: 2019-01-12

## 2019-01-02 RX ORDER — HYDROCODONE BITARTRATE AND ACETAMINOPHEN 5; 325 MG/1; MG/1
1 TABLET ORAL EVERY 6 HOURS PRN
Qty: 120 TABLET | Refills: 0 | Status: SHIPPED | OUTPATIENT
Start: 2019-01-02 | End: 2019-02-11 | Stop reason: SDUPTHER

## 2019-01-02 ASSESSMENT — ENCOUNTER SYMPTOMS
COUGH: 1
VOMITING: 0
ABDOMINAL PAIN: 0
SORE THROAT: 0
RHINORRHEA: 1
NAUSEA: 0
WHEEZING: 1
SHORTNESS OF BREATH: 0
DIARRHEA: 0

## 2019-01-04 DIAGNOSIS — J20.9 ACUTE BRONCHITIS, UNSPECIFIED ORGANISM: Primary | ICD-10-CM

## 2019-01-04 RX ORDER — BENZONATATE 200 MG/1
200 CAPSULE ORAL 3 TIMES DAILY PRN
Qty: 15 CAPSULE | Refills: 0 | Status: SHIPPED | OUTPATIENT
Start: 2019-01-04 | End: 2019-01-09

## 2019-01-14 ENCOUNTER — TELEPHONE (OUTPATIENT)
Dept: INTERNAL MEDICINE CLINIC | Age: 74
End: 2019-01-14

## 2019-01-14 RX ORDER — METHYLPREDNISOLONE 4 MG/1
TABLET ORAL
Qty: 1 KIT | Refills: 0 | Status: SHIPPED | OUTPATIENT
Start: 2019-01-14 | End: 2019-01-20

## 2019-01-17 RX ORDER — POTASSIUM CHLORIDE 750 MG/1
10 TABLET, FILM COATED, EXTENDED RELEASE ORAL DAILY
Qty: 90 TABLET | Refills: 0 | Status: SHIPPED | OUTPATIENT
Start: 2019-01-17 | End: 2019-02-07 | Stop reason: SDUPTHER

## 2019-01-31 RX ORDER — OMEPRAZOLE 20 MG/1
20 CAPSULE, DELAYED RELEASE ORAL DAILY
Qty: 90 CAPSULE | Refills: 0 | Status: SHIPPED | OUTPATIENT
Start: 2019-01-31 | End: 2019-04-18 | Stop reason: SDUPTHER

## 2019-02-07 RX ORDER — POTASSIUM CHLORIDE 750 MG/1
10 TABLET, FILM COATED, EXTENDED RELEASE ORAL DAILY
Qty: 90 TABLET | Refills: 0 | Status: SHIPPED | OUTPATIENT
Start: 2019-02-07 | End: 2019-04-18 | Stop reason: SDUPTHER

## 2019-02-07 RX ORDER — POTASSIUM CHLORIDE 750 MG/1
10 TABLET, FILM COATED, EXTENDED RELEASE ORAL DAILY
Qty: 90 TABLET | Refills: 0 | OUTPATIENT
Start: 2019-02-07

## 2019-02-11 DIAGNOSIS — Z00.00 MEDICARE ANNUAL WELLNESS VISIT, INITIAL: ICD-10-CM

## 2019-02-11 DIAGNOSIS — M54.50 CHRONIC MIDLINE LOW BACK PAIN WITHOUT SCIATICA: ICD-10-CM

## 2019-02-11 DIAGNOSIS — G89.29 CHRONIC MIDLINE LOW BACK PAIN WITHOUT SCIATICA: ICD-10-CM

## 2019-02-11 RX ORDER — HYDROCODONE BITARTRATE AND ACETAMINOPHEN 5; 325 MG/1; MG/1
TABLET ORAL
Qty: 120 TABLET | Refills: 0 | Status: SHIPPED | OUTPATIENT
Start: 2019-02-11 | End: 2019-03-28 | Stop reason: SDUPTHER

## 2019-02-11 RX ORDER — CITALOPRAM 20 MG/1
20 TABLET ORAL DAILY
Qty: 90 TABLET | Refills: 0 | Status: SHIPPED | OUTPATIENT
Start: 2019-02-11 | End: 2019-04-18 | Stop reason: SDUPTHER

## 2019-03-01 DIAGNOSIS — F51.01 PRIMARY INSOMNIA: ICD-10-CM

## 2019-03-05 RX ORDER — ZOLPIDEM TARTRATE 10 MG/1
10 TABLET ORAL NIGHTLY PRN
Qty: 90 TABLET | Refills: 0 | Status: SHIPPED | OUTPATIENT
Start: 2019-03-05 | End: 2019-03-07 | Stop reason: SDUPTHER

## 2019-03-05 RX ORDER — GLIMEPIRIDE 4 MG/1
TABLET ORAL
Qty: 90 TABLET | Refills: 0 | Status: SHIPPED | OUTPATIENT
Start: 2019-03-05 | End: 2019-06-04 | Stop reason: SDUPTHER

## 2019-03-06 DIAGNOSIS — F51.01 PRIMARY INSOMNIA: ICD-10-CM

## 2019-03-06 RX ORDER — ZOLPIDEM TARTRATE 10 MG/1
TABLET ORAL
Qty: 90 TABLET | Refills: 0 | OUTPATIENT
Start: 2019-03-06

## 2019-03-07 RX ORDER — ZOLPIDEM TARTRATE 10 MG/1
10 TABLET ORAL NIGHTLY PRN
Qty: 90 TABLET | Refills: 0 | Status: SHIPPED | OUTPATIENT
Start: 2019-03-07 | End: 2019-06-04 | Stop reason: SDUPTHER

## 2019-03-28 ENCOUNTER — OFFICE VISIT (OUTPATIENT)
Dept: CARDIOLOGY CLINIC | Age: 74
End: 2019-03-28
Payer: MEDICARE

## 2019-03-28 ENCOUNTER — TELEPHONE (OUTPATIENT)
Dept: INTERNAL MEDICINE CLINIC | Age: 74
End: 2019-03-28

## 2019-03-28 VITALS
HEIGHT: 70 IN | DIASTOLIC BLOOD PRESSURE: 74 MMHG | OXYGEN SATURATION: 95 % | HEART RATE: 75 BPM | SYSTOLIC BLOOD PRESSURE: 128 MMHG | WEIGHT: 229 LBS | BODY MASS INDEX: 32.78 KG/M2

## 2019-03-28 DIAGNOSIS — G89.29 CHRONIC MIDLINE LOW BACK PAIN WITHOUT SCIATICA: ICD-10-CM

## 2019-03-28 DIAGNOSIS — I71.40 ABDOMINAL AORTIC ANEURYSM (AAA) WITHOUT RUPTURE: ICD-10-CM

## 2019-03-28 DIAGNOSIS — M54.50 CHRONIC MIDLINE LOW BACK PAIN WITHOUT SCIATICA: ICD-10-CM

## 2019-03-28 DIAGNOSIS — E78.2 MIXED HYPERLIPIDEMIA: ICD-10-CM

## 2019-03-28 DIAGNOSIS — I10 ESSENTIAL HYPERTENSION: ICD-10-CM

## 2019-03-28 DIAGNOSIS — I25.10 CORONARY ARTERY DISEASE INVOLVING NATIVE CORONARY ARTERY OF NATIVE HEART WITHOUT ANGINA PECTORIS: Primary | ICD-10-CM

## 2019-03-28 PROCEDURE — G8482 FLU IMMUNIZE ORDER/ADMIN: HCPCS | Performed by: INTERNAL MEDICINE

## 2019-03-28 PROCEDURE — 4040F PNEUMOC VAC/ADMIN/RCVD: CPT | Performed by: INTERNAL MEDICINE

## 2019-03-28 PROCEDURE — 3017F COLORECTAL CA SCREEN DOC REV: CPT | Performed by: INTERNAL MEDICINE

## 2019-03-28 PROCEDURE — G8427 DOCREV CUR MEDS BY ELIG CLIN: HCPCS | Performed by: INTERNAL MEDICINE

## 2019-03-28 PROCEDURE — 1123F ACP DISCUSS/DSCN MKR DOCD: CPT | Performed by: INTERNAL MEDICINE

## 2019-03-28 PROCEDURE — 99214 OFFICE O/P EST MOD 30 MIN: CPT | Performed by: INTERNAL MEDICINE

## 2019-03-28 PROCEDURE — G8598 ASA/ANTIPLAT THER USED: HCPCS | Performed by: INTERNAL MEDICINE

## 2019-03-28 PROCEDURE — 4004F PT TOBACCO SCREEN RCVD TLK: CPT | Performed by: INTERNAL MEDICINE

## 2019-03-28 PROCEDURE — G8417 CALC BMI ABV UP PARAM F/U: HCPCS | Performed by: INTERNAL MEDICINE

## 2019-03-28 RX ORDER — HYDROCODONE BITARTRATE AND ACETAMINOPHEN 5; 325 MG/1; MG/1
1 TABLET ORAL EVERY 6 HOURS PRN
COMMUNITY
End: 2019-05-17 | Stop reason: ALTCHOICE

## 2019-03-29 ENCOUNTER — TELEPHONE (OUTPATIENT)
Dept: INTERNAL MEDICINE CLINIC | Age: 74
End: 2019-03-29

## 2019-03-29 RX ORDER — HYDROCODONE BITARTRATE AND ACETAMINOPHEN 5; 325 MG/1; MG/1
1 TABLET ORAL EVERY 6 HOURS PRN
Qty: 120 TABLET | Refills: 0 | Status: SHIPPED | OUTPATIENT
Start: 2019-03-29 | End: 2019-05-17 | Stop reason: SDUPTHER

## 2019-04-01 ENCOUNTER — TELEPHONE (OUTPATIENT)
Dept: INTERNAL MEDICINE CLINIC | Age: 74
End: 2019-04-01

## 2019-04-01 NOTE — TELEPHONE ENCOUNTER
----- Message from Roxana Downs sent at 3/29/2019  4:58 PM EDT -----  Contact: pt wife Karen Arroyo [phone]      ----- Message -----  From: Shanti Alfaro MD  Sent: 3/28/2019   3:59 PM  To: Corby Leyva MA, Gaye Fraire    No idea what it is  Its up to pt to get one     ----- Message -----  From: Zhao Fraire  Sent: 3/28/2019   1:33 PM  To: Shanti Alfaro MD    His wife stopped in -said they seen this on tv and wanted to know if it would be ok for him to get one- it is called Dr.Ho duffy up back brace -please let her know what you think -last appt- 1-2-19-next appt- 4-18-19-lr

## 2019-04-02 ENCOUNTER — TELEPHONE (OUTPATIENT)
Dept: INTERNAL MEDICINE CLINIC | Age: 74
End: 2019-04-02

## 2019-04-02 NOTE — TELEPHONE ENCOUNTER
----- Message from Alma Rojas MD sent at 2019  3:32 PM EDT -----  Contact: pt wife Xenia Acosta- [phone]  We can order it but insurance might not cover  Please do 2L    ----- Message -----  From: Page Moe  Sent: 2019   2:09 PM  To: Alma Rojas MD    Pt's spouse requesting a script for portable oxygen. States their previous script for oxygen was for 5 yrs and it is  now.  ----- Message -----  From: Sonya Waller MA  Sent: 3/29/2019   4:58 PM  To:  Page Moe        ----- Message -----  From: Alma Rojas MD  Sent: 3/28/2019   3:59 PM  To: Sonya Waller MA, Gaye Fraire    No idea what it is  Its up to pt to get one     ----- Message -----  From: Luz Fraire  Sent: 3/28/2019   1:33 PM  To: Alma Rojas MD    His wife stopped in -said they seen this on tv and wanted to know if it would be ok for him to get one- it is called Dr.Ho duffy up back brace -please let her know what you think -last appt- 19-next appt- 19-

## 2019-04-10 RX ORDER — FUROSEMIDE 40 MG/1
40 TABLET ORAL DAILY
Qty: 90 TABLET | Refills: 0 | Status: SHIPPED | OUTPATIENT
Start: 2019-04-10 | End: 2019-07-11

## 2019-04-18 ENCOUNTER — TELEPHONE (OUTPATIENT)
Dept: INTERNAL MEDICINE CLINIC | Age: 74
End: 2019-04-18

## 2019-04-18 ENCOUNTER — OFFICE VISIT (OUTPATIENT)
Dept: INTERNAL MEDICINE CLINIC | Age: 74
End: 2019-04-18

## 2019-04-18 VITALS
DIASTOLIC BLOOD PRESSURE: 75 MMHG | BODY MASS INDEX: 31.36 KG/M2 | HEART RATE: 70 BPM | HEIGHT: 71 IN | SYSTOLIC BLOOD PRESSURE: 130 MMHG | RESPIRATION RATE: 18 BRPM | WEIGHT: 224 LBS

## 2019-04-18 DIAGNOSIS — L89.310: ICD-10-CM

## 2019-04-18 DIAGNOSIS — I10 ESSENTIAL HYPERTENSION: ICD-10-CM

## 2019-04-18 DIAGNOSIS — E78.2 MIXED HYPERLIPIDEMIA: ICD-10-CM

## 2019-04-18 DIAGNOSIS — E11.8 TYPE 2 DIABETES MELLITUS WITH COMPLICATION, WITHOUT LONG-TERM CURRENT USE OF INSULIN (HCC): ICD-10-CM

## 2019-04-18 DIAGNOSIS — R06.83 SNORING: ICD-10-CM

## 2019-04-18 DIAGNOSIS — I25.10 CORONARY ARTERY DISEASE INVOLVING NATIVE CORONARY ARTERY OF NATIVE HEART WITHOUT ANGINA PECTORIS: ICD-10-CM

## 2019-04-18 DIAGNOSIS — E11.8 TYPE 2 DIABETES MELLITUS WITH COMPLICATION, WITHOUT LONG-TERM CURRENT USE OF INSULIN (HCC): Primary | ICD-10-CM

## 2019-04-18 PROCEDURE — 4004F PT TOBACCO SCREEN RCVD TLK: CPT | Performed by: INTERNAL MEDICINE

## 2019-04-18 PROCEDURE — 2022F DILAT RTA XM EVC RTNOPTHY: CPT | Performed by: INTERNAL MEDICINE

## 2019-04-18 PROCEDURE — 4040F PNEUMOC VAC/ADMIN/RCVD: CPT | Performed by: INTERNAL MEDICINE

## 2019-04-18 PROCEDURE — G8598 ASA/ANTIPLAT THER USED: HCPCS | Performed by: INTERNAL MEDICINE

## 2019-04-18 PROCEDURE — 99214 OFFICE O/P EST MOD 30 MIN: CPT | Performed by: INTERNAL MEDICINE

## 2019-04-18 PROCEDURE — G8427 DOCREV CUR MEDS BY ELIG CLIN: HCPCS | Performed by: INTERNAL MEDICINE

## 2019-04-18 PROCEDURE — 3017F COLORECTAL CA SCREEN DOC REV: CPT | Performed by: INTERNAL MEDICINE

## 2019-04-18 PROCEDURE — 1123F ACP DISCUSS/DSCN MKR DOCD: CPT | Performed by: INTERNAL MEDICINE

## 2019-04-18 PROCEDURE — G8417 CALC BMI ABV UP PARAM F/U: HCPCS | Performed by: INTERNAL MEDICINE

## 2019-04-18 PROCEDURE — 3046F HEMOGLOBIN A1C LEVEL >9.0%: CPT | Performed by: INTERNAL MEDICINE

## 2019-04-18 RX ORDER — OMEPRAZOLE 20 MG/1
20 CAPSULE, DELAYED RELEASE ORAL DAILY
Qty: 90 CAPSULE | Refills: 0 | Status: SHIPPED | OUTPATIENT
Start: 2019-04-18 | End: 2019-07-17 | Stop reason: SDUPTHER

## 2019-04-18 RX ORDER — CITALOPRAM 20 MG/1
20 TABLET ORAL DAILY
Qty: 90 TABLET | Refills: 0 | Status: SHIPPED | OUTPATIENT
Start: 2019-04-18 | End: 2019-10-15 | Stop reason: SDUPTHER

## 2019-04-18 RX ORDER — POTASSIUM CHLORIDE 750 MG/1
10 TABLET, FILM COATED, EXTENDED RELEASE ORAL DAILY
Qty: 90 TABLET | Refills: 0 | Status: SHIPPED | OUTPATIENT
Start: 2019-04-18 | End: 2019-07-11

## 2019-04-18 ASSESSMENT — PATIENT HEALTH QUESTIONNAIRE - PHQ9
1. LITTLE INTEREST OR PLEASURE IN DOING THINGS: 0
SUM OF ALL RESPONSES TO PHQ QUESTIONS 1-9: 0
SUM OF ALL RESPONSES TO PHQ9 QUESTIONS 1 & 2: 0
2. FEELING DOWN, DEPRESSED OR HOPELESS: 0
SUM OF ALL RESPONSES TO PHQ QUESTIONS 1-9: 0

## 2019-04-18 NOTE — PATIENT INSTRUCTIONS
Patient Self-Management Goal for Health Maintenance  Goal: I will schedule routine eye examinations with an eye specialist as directed by my provider.   Barriers: none  Plan for overcoming my barriers: N/A  Confidence: 10/10  Anticipated Goal Completion Date: 7/18/19

## 2019-04-18 NOTE — PROGRESS NOTES
(PRINIVIL;ZESTRIL) 40 MG tablet Take 1 tablet by mouth daily 90 tablet 3    metoprolol succinate (TOPROL XL) 50 MG extended release tablet Take 1 tablet by mouth daily 90 tablet 3    Multiple Vitamins-Minerals (THERAPEUTIC MULTIVITAMIN-MINERALS) tablet Take 1 tablet by mouth daily      diclofenac sodium 1 % GEL Apply 2 g topically 3 times daily 3 Tube 1    clotrimazole-betamethasone (LOTRISONE) 1-0.05 % cream Apply topically 2 times daily. 45 g 0    glucose blood VI test strips (JORGE ALBERTO CONTOUR TEST) strip Once daily. DX:E11.9 100 each 11    nitroGLYCERIN (NITROSTAT) 0.4 MG SL tablet Place 1 tablet under the tongue every 5 minutes as needed for Chest pain. 25 tablet 3    albuterol (PROVENTIL HFA;VENTOLIN HFA) 108 (90 BASE) MCG/ACT inhaler Inhale 2 puffs into the lungs every 6 hours as needed for Wheezing. 1 Inhaler 2    aspirin 81 MG EC tablet Take 81 mg by mouth daily. Indications: stopped for surgery       No current facility-administered medications for this visit. Review of Systems     As above    There are no changes to past medical history, family history, social history or review of systems(except as noted in the history section) since prior note (all reviewed with patient). Objective:   Physical Exam  Vitals:    04/18/19 1303   BP: 130/75   Pulse: 70   Resp: 18         General: eldery male  Awake, alert and oriented. Appears to be not in any distress  Mucous Membranes:  Pink , anicteric  Neck: No JVD, no carotid bruit, no thyromegaly  Chest:  Clear to auscultation bilaterally, occasional wheeze  Cardiovascular:  RRR S1S2 heard, no murmurs or gallops  Abdomen:  Soft, obese, undistended, non tender, no organomegaly, BS present  Extremities: no pedal  edema bilalterally. . Distal pulses well felt.    Neurological : grossly normal.      ECHO 2017     The LV systolic function is mildly reduced with an EF of 40-45%   There is akinesis of the apical-septal segment.   Mild concentric left ventricular hypertrophy.   Grade I diastolic dysfunction with normal filling pressure.   Trivial mitral regurgitation.   Mild aortic regurgitation.   Systolic pulmonary artery pressure (SPAP) is normal and estimated at 30mmHg   (RA pressure 3mmHg).    lexiscan - 2/17-      - Abnormal high risk myocardial perfusion study due to severe left    ventricular dysfunction.    - There is a large size severe fixed defect involving the apex, apical    segments and mid anteroseptum consistent with prior infarct.    - There is no inducible ischemia.    - Left ventricular cavity is dilated.    - Anteroseptum, apical segments and apex are akinetic.    - Left ventricular systolic function is severely reduced with ejection    fraction of 30 %. Assessment:       Diagnosis Orders   1. Type 2 diabetes mellitus with complication, without long-term current use of insulin ([facility])  HEMOGLOBIN A1C    COMPREHENSIVE METABOLIC PANEL   2. Coronary artery disease involving native coronary artery of native heart without angina pectoris     3. Mixed hyperlipidemia     4. Essential hypertension     5. Snoring             Plan:       DM- 2- well controlled with metformin and amaryl. amaryl 4 mg in am, 2 mg at night  No low sugars further. Last A1c at 6.8    HTN- well controlled. On metoprolol, lisinopril and on lasix     Hyperlipidemia- stable on statins- stable lipids - LDL at 81    CAD with h.o stent - equivocal stress 2017 . Continue ASA, statins  metoprolol    Ischemic cardiomyopathy , systolic CHF -   Currently on lasix , metoprolol, ACEI    Chronic low back pain , currently stable on vicodin  OARRS being monitored. Discussed weaning and pt agrees    COPD- stable, should start back  using oxygen   Continue albuterol , continues to smoke .  Advised to quit         Cut down smoking  Has living will    Need eye exam, dental exam  Need to quit smoking but pt refuses to quit  Need more activity - but unable to do with back issues    Need AAA screen and ct lung screen- discussed today    Rosalinda Moses received counseling on the following healthy behaviors: nutrition, exercise, medication adherence and tobacco cessation  Reviewed prior labs and health maintenance  Continue current medications, diet and exercise. Discussed use, benefit, and side effects of prescribed medications. Barriers to medication compliance addressed. Patient given educational materials - see patient instructions  Was a self-tracking handout given in paper form or via [employer]? Yes    Requested Prescriptions     Signed Prescriptions Disp Refills    citalopram (CELEXA) 20 MG tablet 90 tablet 0     Sig: Take 1 tablet by mouth daily    potassium chloride (KLOR-CON) 10 MEQ extended release tablet 90 tablet 0     Sig: Take 1 tablet by mouth daily    metFORMIN (GLUCOPHAGE) 500 MG tablet 180 tablet 1     Sig: One tab in am, 1 at pm       All patient questions answered. Patient voiced understanding. Quality Measures    Body mass index is 31.24 kg/m². Normal. Weight control planned discussed conventional weight loss, daily exercise regimen and Healthy diet and regular exercise. BP: 130/75. Blood pressure is normal. Treatment plan consists of Weight Reduction, Avoid Tobacco and Second-hand Smoke and No treatment change needed. Fall Risk 4/18/2019 12/4/2018 12/4/2018 8/30/2018 8/15/2017 8/9/2016 8/3/2015   2 or more falls in past year? no no no no no no no   Fall with injury in past year? no no no no no no no     The patient does not have a history of falls. I did , complete a risk assessment for falls.  A plan of care for falls No Treatment plan indicated       Lab Results   Component Value Date    LDLCALC 72 12/04/2018    LDLDIRECT 97 03/15/2018    (goal LDL reduction with dx if diabetes is 50% LDL reduction)    PHQ Scores 4/18/2019 12/4/2018 12/4/2018 8/30/2018 8/15/2017 8/9/2016 8/3/2015   PHQ2 Score 0 1 0 0 0 0 0   PHQ9 Score 0 1 0 0 0 0 0     Interpretation of Total Score Depression Severity: 1-4 = Minimal depression, 5-9 = Mild depression, 10-14 = Moderate depression, 15-19 = Moderately severe depression, 20-27 = Severe depression        Chronic Disease Visit Information    BP Readings from Last 3 Encounters:   04/18/19 130/75   03/28/19 128/74   01/02/19 (!) 160/80          Hemoglobin A1C (%)   Date Value   12/04/2018 6.8   08/30/2018 6.7   03/15/2018 6.6     Microscopic Examination (no units)   Date Value   02/04/2017 YES     Microalbumin, Random Urine (mg/dL)   Date Value   12/04/2018 1.80     LDL Calculated (mg/dL)   Date Value   12/04/2018 72     HDL   Date Value   12/04/2018 32 mg/dL (L)   12/02/2009 34 mg/dl (L)     BUN (mg/dL)   Date Value   12/04/2018 11     CREATININE (mg/dL)   Date Value   12/04/2018 0.7 (L)     Glucose (mg/dL)   Date Value   12/04/2018 130 (H)            Have you changed or started any medications since your last visit including any over-the-counter medicines, vitamins, or herbal medicines? no   Are you having any side effects from any of your medications? -  no  Have you stopped taking any of your medications? Is so, why? -  no    Have you seen any other physician or provider since your last visit? No  Have you had any other diagnostic tests since your last visit? No  Have you been seen in the emergency room and/or had an admission to a hospital since we last saw you? No  Have you had your annual diabetic retinal (eye) exam? No  Have you had your routine dental cleaning in the past 6 months? no    Have you activated your [employer] account? If not, what are your barriers?  Yes     Patient Care Team:  Alma Rojas MD as PCP - General  Alma Rojas MD as PCP - [facility] Attributed Provider  Janiya Alvarado MD as Consulting Physician (Cardiology)         Medical History Review  Past Medical, Family, and Social History reviewed and does not contribute to the patient presenting condition    Health Maintenance   Topic Date Due    Diabetic retinal exam  01/03/1955    Shingles Vaccine (1 of 2) 01/03/1997    Low dose CT lung screening  05/06/2015    Pneumococcal 65+ years Vaccine (2 of 2 - PPSV23) 05/12/2017    Colon Cancer Screen FIT/FOBT  08/21/2018    Diabetic foot exam  03/15/2019    A1C test (Diabetic or Prediabetic)  12/04/2019    Diabetic microalbuminuria test  12/04/2019    Lipid screen  12/04/2019    Potassium monitoring  12/04/2019    Creatinine monitoring  12/04/2019    DTaP/Tdap/Td vaccine (2 - Td) 07/31/2022    Flu vaccine  Completed    Hepatitis C screen  Completed

## 2019-04-19 ENCOUNTER — TELEPHONE (OUTPATIENT)
Dept: INTERNAL MEDICINE CLINIC | Age: 74
End: 2019-04-19

## 2019-04-19 LAB
A/G RATIO: 2.2 (ref 1.1–2.2)
ALBUMIN SERPL-MCNC: 4.7 G/DL (ref 3.4–5)
ALP BLD-CCNC: 91 U/L (ref 40–129)
ALT SERPL-CCNC: 8 U/L (ref 10–40)
ANION GAP SERPL CALCULATED.3IONS-SCNC: 14 MMOL/L (ref 3–16)
AST SERPL-CCNC: 14 U/L (ref 15–37)
BILIRUB SERPL-MCNC: 0.6 MG/DL (ref 0–1)
BUN BLDV-MCNC: 11 MG/DL (ref 7–20)
CALCIUM SERPL-MCNC: 9.8 MG/DL (ref 8.3–10.6)
CHLORIDE BLD-SCNC: 97 MMOL/L (ref 99–110)
CO2: 23 MMOL/L (ref 21–32)
CREAT SERPL-MCNC: 0.9 MG/DL (ref 0.8–1.3)
ESTIMATED AVERAGE GLUCOSE: 159.9 MG/DL
GFR AFRICAN AMERICAN: >60
GFR NON-AFRICAN AMERICAN: >60
GLOBULIN: 2.1 G/DL
GLUCOSE BLD-MCNC: 155 MG/DL (ref 70–99)
HBA1C MFR BLD: 7.2 %
POTASSIUM SERPL-SCNC: 5.5 MMOL/L (ref 3.5–5.1)
SODIUM BLD-SCNC: 134 MMOL/L (ref 136–145)
TOTAL PROTEIN: 6.8 G/DL (ref 6.4–8.2)

## 2019-04-19 NOTE — TELEPHONE ENCOUNTER
----- Message from Micheline Durand MD sent at 4/19/2019 11:59 AM EDT -----  Stop it    ----- Message -----  From: Bushra Craig  Sent: 4/19/2019   8:47 AM  To: Micheline Durand MD    FYI: Pt's cardiologist has pt taking potassium tablet every other day instead of every day.

## 2019-04-19 NOTE — TELEPHONE ENCOUNTER
----- Message from Guy Crespo MD sent at 4/19/2019 11:59 AM EDT -----  Stop it    ----- Message -----  From: Vika Bynum  Sent: 4/19/2019   8:47 AM  To: Guy Crespo MD    FYI: Pt's cardiologist has pt taking potassium tablet every other day instead of every day.

## 2019-04-19 NOTE — TELEPHONE ENCOUNTER
----- Message from Dionte Stokes MD sent at 4/19/2019 11:58 AM EDT -----  Contact: Tc  dont need an appt  But you can check ambulatory and resting    ----- Message -----  From: Phillip Rivas  Sent: 4/18/2019   5:21 PM  To: Dionte Stokes MD    Do we need pt to come back and just do the oxygen testing without an appt?  ----- Message -----  From: Dionte Stokes MD  Sent: 4/18/2019   5:15 PM  To: Diomedes Webster    Its little late , we could have done in office today    ----- Message -----  From: Phillip Rivas  Sent: 4/18/2019   4:24 PM  To: Dionte Stokes MD    Tc needs new order for pt oxygen stating it is continuous and not nightly. Also needing testing done on room air. Please advise.

## 2019-04-19 NOTE — TELEPHONE ENCOUNTER
----- Message from Alma Rojas MD sent at 4/19/2019 11:58 AM EDT -----  Contact: Tc  dont need an appt  But you can check ambulatory and resting    ----- Message -----  From: Page Moe  Sent: 4/18/2019   5:21 PM  To: Alma Rojas MD    Do we need pt to come back and just do the oxygen testing without an appt?  ----- Message -----  From: Alma Rojas MD  Sent: 4/18/2019   5:15 PM  To: Veronika Webster    Its little late , we could have done in office today    ----- Message -----  From: Page Moe  Sent: 4/18/2019   4:24 PM  To: Alma Rojas MD    Tc needs new order for pt oxygen stating it is continuous and not nightly. Also needing testing done on room air. Please advise.

## 2019-05-04 ENCOUNTER — HOSPITAL ENCOUNTER (OUTPATIENT)
Dept: ULTRASOUND IMAGING | Age: 74
Discharge: HOME | End: 2019-05-04
Payer: MEDICARE

## 2019-05-04 ENCOUNTER — HOSPITAL ENCOUNTER (OUTPATIENT)
Dept: CT IMAGING | Age: 74
Discharge: HOME | End: 2019-05-04
Payer: MEDICARE

## 2019-05-04 DIAGNOSIS — Z87.891 PERSONAL HISTORY OF TOBACCO USE: ICD-10-CM

## 2019-05-04 DIAGNOSIS — F17.200 TOBACCO USE DISORDER: ICD-10-CM

## 2019-05-04 PROCEDURE — G0297 LDCT FOR LUNG CA SCREEN: HCPCS

## 2019-05-04 PROCEDURE — 76706 US ABDL AORTA SCREEN AAA: CPT

## 2019-05-07 DIAGNOSIS — R91.8 PULMONARY NODULES: ICD-10-CM

## 2019-05-07 DIAGNOSIS — R93.89 ABNORMAL CT OF THE CHEST: Primary | ICD-10-CM

## 2019-05-07 RX ORDER — FUROSEMIDE 40 MG/1
40 TABLET ORAL DAILY
Qty: 90 TABLET | Refills: 0 | Status: ON HOLD | OUTPATIENT
Start: 2019-05-07 | End: 2020-05-05

## 2019-05-15 ENCOUNTER — TELEPHONE (OUTPATIENT)
Dept: INTERNAL MEDICINE CLINIC | Age: 74
End: 2019-05-15

## 2019-05-15 NOTE — TELEPHONE ENCOUNTER
----- Message from Ara Mario sent at 5/15/2019 12:24 PM EDT -----  Contact: pts wife [phone]  Pt's wife needs an order for a PET scan for her . Pt would like the orders sent to [facility]. Pt's phone [phone].

## 2019-05-17 DIAGNOSIS — M54.50 CHRONIC MIDLINE LOW BACK PAIN WITHOUT SCIATICA: ICD-10-CM

## 2019-05-17 DIAGNOSIS — G89.29 CHRONIC MIDLINE LOW BACK PAIN WITHOUT SCIATICA: ICD-10-CM

## 2019-05-17 RX ORDER — HYDROCODONE BITARTRATE AND ACETAMINOPHEN 5; 325 MG/1; MG/1
TABLET ORAL
Qty: 120 TABLET | Refills: 0 | Status: SHIPPED | OUTPATIENT
Start: 2019-05-17 | End: 2019-06-27 | Stop reason: SDUPTHER

## 2019-05-23 ENCOUNTER — TELEPHONE (OUTPATIENT)
Dept: CASE MANAGEMENT | Age: 74
End: 2019-05-23

## 2019-05-23 DIAGNOSIS — R93.89 ABNORMAL CT OF THE CHEST: Primary | ICD-10-CM

## 2019-05-23 DIAGNOSIS — R91.8 LUNG MASS: ICD-10-CM

## 2019-05-30 ENCOUNTER — TELEPHONE (OUTPATIENT)
Dept: CASE MANAGEMENT | Age: 74
End: 2019-05-30

## 2019-05-30 ENCOUNTER — TELEPHONE (OUTPATIENT)
Dept: INTERNAL MEDICINE CLINIC | Age: 74
End: 2019-05-30

## 2019-05-30 NOTE — TELEPHONE ENCOUNTER
----- Message from Charla Gonzáles MD sent at 2019  8:37 AM EDT -----  Contact: pt's wife  Sure  Cancel oxygen    ----- Message -----  From: Shweta Taveras  Sent: 2019   4:43 PM  To: Charla Gonzáles MD    Pt's wife called and said pt has an oxygen tank from [facility] that they're still paying for, she said their friend gave them her  husbands tank that works good so she is requesting for you to cancel the one from [facility]. She also said that she will be getting a test done downstairs and wants to know if she can come up when it is finished to talk with you, she would like a call back at either [phone] or [phone].

## 2019-06-03 RX ORDER — CLOBETASOL PROPIONATE 0.5 MG/G
OINTMENT TOPICAL
Qty: 60 G | Refills: 0 | Status: SHIPPED | OUTPATIENT
Start: 2019-06-03 | End: 2019-08-13

## 2019-06-04 DIAGNOSIS — F51.01 PRIMARY INSOMNIA: ICD-10-CM

## 2019-06-04 RX ORDER — GLIMEPIRIDE 4 MG/1
TABLET ORAL
Qty: 90 TABLET | Refills: 0 | Status: SHIPPED | OUTPATIENT
Start: 2019-06-04 | End: 2019-09-09 | Stop reason: SDUPTHER

## 2019-06-04 RX ORDER — ZOLPIDEM TARTRATE 10 MG/1
TABLET ORAL
Qty: 90 TABLET | Refills: 0 | Status: SHIPPED | OUTPATIENT
Start: 2019-06-05 | End: 2019-09-03 | Stop reason: SDUPTHER

## 2019-06-13 ENCOUNTER — HOSPITAL ENCOUNTER (OUTPATIENT)
Dept: PET IMAGING | Age: 74
Discharge: HOME | End: 2019-06-13
Payer: MEDICARE

## 2019-06-13 VITALS — HEIGHT: 71 IN | WEIGHT: 220 LBS | BODY MASS INDEX: 30.8 KG/M2

## 2019-06-13 DIAGNOSIS — R91.8 LUNG MASS: ICD-10-CM

## 2019-06-13 DIAGNOSIS — R93.89 ABNORMAL CT OF THE CHEST: ICD-10-CM

## 2019-06-13 PROCEDURE — A9552 F18 FDG: HCPCS | Performed by: INTERNAL MEDICINE

## 2019-06-13 PROCEDURE — 78815 PET IMAGE W/CT SKULL-THIGH: CPT

## 2019-06-13 PROCEDURE — 3430000000 HC RX DIAGNOSTIC RADIOPHARMACEUTICAL: Performed by: INTERNAL MEDICINE

## 2019-06-13 RX ORDER — FLUDEOXYGLUCOSE F 18 200 MCI/ML
13.47 INJECTION, SOLUTION INTRAVENOUS
Status: COMPLETED | OUTPATIENT
Start: 2019-06-13 | End: 2019-06-13

## 2019-06-13 RX ADMIN — FLUDEOXYGLUCOSE F 18 13.47 MILLICURIE: 200 INJECTION, SOLUTION INTRAVENOUS at 09:12

## 2019-06-21 ENCOUNTER — TELEPHONE (OUTPATIENT)
Dept: INTERNAL MEDICINE CLINIC | Age: 74
End: 2019-06-21

## 2019-06-21 ENCOUNTER — TELEPHONE (OUTPATIENT)
Dept: PULMONOLOGY | Age: 74
End: 2019-06-21

## 2019-06-21 DIAGNOSIS — R91.1 LUNG NODULE: Primary | ICD-10-CM

## 2019-06-26 ENCOUNTER — TELEPHONE (OUTPATIENT)
Dept: PULMONOLOGY | Age: 74
End: 2019-06-26

## 2019-06-26 NOTE — TELEPHONE ENCOUNTER
Patient did not show for NPT appointment  with Dr. Maritza Hatchet on 6/26/19    Same Day Cancellation: Yes    Patient rescheduled:  Yes    New appointment: 7/11/19 @11:30am    Patient was also no show on: N/A    LOV N/A never seen in office . Idalmis Hood Was seen in hosp by Dr. Candelaria Cole in 2014.

## 2019-06-27 DIAGNOSIS — M54.50 CHRONIC MIDLINE LOW BACK PAIN WITHOUT SCIATICA: ICD-10-CM

## 2019-06-27 DIAGNOSIS — G89.29 CHRONIC MIDLINE LOW BACK PAIN WITHOUT SCIATICA: ICD-10-CM

## 2019-06-27 RX ORDER — HYDROCODONE BITARTRATE AND ACETAMINOPHEN 5; 325 MG/1; MG/1
1 TABLET ORAL EVERY 8 HOURS PRN
Qty: 90 TABLET | Refills: 0 | Status: SHIPPED | OUTPATIENT
Start: 2019-06-27 | End: 2019-08-06 | Stop reason: SDUPTHER

## 2019-07-11 ENCOUNTER — OFFICE VISIT (OUTPATIENT)
Dept: PULMONOLOGY | Age: 74
End: 2019-07-11
Payer: MEDICARE

## 2019-07-11 VITALS
OXYGEN SATURATION: 96 % | TEMPERATURE: 98 F | BODY MASS INDEX: 31.69 KG/M2 | WEIGHT: 226.4 LBS | SYSTOLIC BLOOD PRESSURE: 141 MMHG | RESPIRATION RATE: 20 BRPM | HEART RATE: 72 BPM | HEIGHT: 71 IN | DIASTOLIC BLOOD PRESSURE: 72 MMHG

## 2019-07-11 DIAGNOSIS — J84.9 ILD (INTERSTITIAL LUNG DISEASE) (HCC): Primary | ICD-10-CM

## 2019-07-11 DIAGNOSIS — R91.1 PULMONARY NODULE: ICD-10-CM

## 2019-07-11 PROCEDURE — 4004F PT TOBACCO SCREEN RCVD TLK: CPT | Performed by: INTERNAL MEDICINE

## 2019-07-11 PROCEDURE — G8417 CALC BMI ABV UP PARAM F/U: HCPCS | Performed by: INTERNAL MEDICINE

## 2019-07-11 PROCEDURE — 1123F ACP DISCUSS/DSCN MKR DOCD: CPT | Performed by: INTERNAL MEDICINE

## 2019-07-11 PROCEDURE — G8598 ASA/ANTIPLAT THER USED: HCPCS | Performed by: INTERNAL MEDICINE

## 2019-07-11 PROCEDURE — 99204 OFFICE O/P NEW MOD 45 MIN: CPT | Performed by: INTERNAL MEDICINE

## 2019-07-11 PROCEDURE — 4040F PNEUMOC VAC/ADMIN/RCVD: CPT | Performed by: INTERNAL MEDICINE

## 2019-07-11 PROCEDURE — 3017F COLORECTAL CA SCREEN DOC REV: CPT | Performed by: INTERNAL MEDICINE

## 2019-07-11 PROCEDURE — G8427 DOCREV CUR MEDS BY ELIG CLIN: HCPCS | Performed by: INTERNAL MEDICINE

## 2019-07-17 RX ORDER — OMEPRAZOLE 20 MG/1
20 CAPSULE, DELAYED RELEASE ORAL DAILY
Qty: 90 CAPSULE | Refills: 0 | Status: SHIPPED | OUTPATIENT
Start: 2019-07-17 | End: 2019-10-15 | Stop reason: SDUPTHER

## 2019-07-18 ENCOUNTER — OFFICE VISIT (OUTPATIENT)
Dept: ORTHOPEDIC SURGERY | Age: 74
End: 2019-07-18
Payer: MEDICARE

## 2019-07-18 VITALS
HEART RATE: 78 BPM | HEIGHT: 71 IN | DIASTOLIC BLOOD PRESSURE: 88 MMHG | WEIGHT: 226.41 LBS | SYSTOLIC BLOOD PRESSURE: 161 MMHG | BODY MASS INDEX: 31.7 KG/M2

## 2019-07-18 DIAGNOSIS — M47.816 LUMBAR FACET ARTHROPATHY: ICD-10-CM

## 2019-07-18 DIAGNOSIS — M51.36 DDD (DEGENERATIVE DISC DISEASE), LUMBAR: Primary | ICD-10-CM

## 2019-07-18 DIAGNOSIS — M48.062 LUMBAR STENOSIS WITH NEUROGENIC CLAUDICATION: ICD-10-CM

## 2019-07-18 PROCEDURE — 3017F COLORECTAL CA SCREEN DOC REV: CPT | Performed by: PHYSICIAN ASSISTANT

## 2019-07-18 PROCEDURE — G8598 ASA/ANTIPLAT THER USED: HCPCS | Performed by: PHYSICIAN ASSISTANT

## 2019-07-18 PROCEDURE — 99214 OFFICE O/P EST MOD 30 MIN: CPT | Performed by: PHYSICIAN ASSISTANT

## 2019-07-18 PROCEDURE — 1123F ACP DISCUSS/DSCN MKR DOCD: CPT | Performed by: PHYSICIAN ASSISTANT

## 2019-07-18 PROCEDURE — G8427 DOCREV CUR MEDS BY ELIG CLIN: HCPCS | Performed by: PHYSICIAN ASSISTANT

## 2019-07-18 PROCEDURE — 4004F PT TOBACCO SCREEN RCVD TLK: CPT | Performed by: PHYSICIAN ASSISTANT

## 2019-07-18 PROCEDURE — G8417 CALC BMI ABV UP PARAM F/U: HCPCS | Performed by: PHYSICIAN ASSISTANT

## 2019-07-18 PROCEDURE — 4040F PNEUMOC VAC/ADMIN/RCVD: CPT | Performed by: PHYSICIAN ASSISTANT

## 2019-07-18 RX ORDER — GABAPENTIN 300 MG/1
CAPSULE ORAL
Qty: 90 CAPSULE | Refills: 2 | Status: SHIPPED | OUTPATIENT
Start: 2019-07-18 | End: 2019-09-03

## 2019-07-18 NOTE — PROGRESS NOTES
([facility]     COPD (chronic obstructive pulmonary disease) ([facility]     Depression     Diabetes mellitus ([facility]     Heart disease     Hernia     History of blood transfusion     Hyperlipidemia     Hypertension     Osteoarthritis     Prolonged emergence from general anesthesia     Rheumatic fever     Type II or unspecified type diabetes mellitus without mention of complication, not stated as uncontrolled         REVIEW OF SYSTEMS:   CONSTITUTIONAL: Denies unexplained weight loss, fevers, chills or fatigue  NEUROLOGIC: Denies tremors or seizures         PHYSICAL EXAM:    Vitals: Blood pressure (!) 161/88, pulse 78, height 5' 10.98\" (1.803 m), weight 226 lb 6.6 oz (102.7 kg). GENERAL EXAM:  · General Apparence: Patient is adequately groomed with no evidence of malnutrition. · Orientation: The patient is oriented to time, place and person. · Mood & Affect:The patient's mood and affect are appropriate    · Sensation: Sensation is intact without deficit  LUMBAR/SACRAL EXAMINATION:  · Inspection: Local inspection shows no step-off or bruising. Lumbar alignment is normal.  Sagittal and Coronal balance is neutral.      · Palpation:   No evidence of tenderness at the midline. No tenderness bilaterally at the paraspinal or trochanters. There is no step-off or paraspinal spasm. · Range of Motion:   30° of flexion, 10° extension more pain with extension  · Strength:   Strength testing is 5/5 in all muscle groups tested. · Special Tests:   Straight leg raise and crossed SLR negative. Leg length and pelvis level.  0 out of 5 Paige's signs. · Skin: There are no rashes, ulcerations or lesions. · Reflexes: Reflexes are symmetrically trace to 1+ at the patellar and ankle tendons. Clonus absent bilaterally at the feet.   · Gait & station: Mildly antalgic unassisted unassisted  · Additional Examinations:   RIGHT LOWER EXTREMITY: Inspection/examination of the right lower extremity does not show any tenderness,

## 2019-08-05 ENCOUNTER — TELEPHONE (OUTPATIENT)
Dept: ORTHOPEDIC SURGERY | Age: 74
End: 2019-08-05

## 2019-08-06 DIAGNOSIS — M54.50 CHRONIC MIDLINE LOW BACK PAIN WITHOUT SCIATICA: ICD-10-CM

## 2019-08-06 DIAGNOSIS — G89.29 CHRONIC MIDLINE LOW BACK PAIN WITHOUT SCIATICA: ICD-10-CM

## 2019-08-06 RX ORDER — HYDROCODONE BITARTRATE AND ACETAMINOPHEN 5; 325 MG/1; MG/1
1 TABLET ORAL EVERY 8 HOURS PRN
Qty: 90 TABLET | Refills: 0 | Status: SHIPPED | OUTPATIENT
Start: 2019-08-06 | End: 2019-09-16 | Stop reason: SDUPTHER

## 2019-08-13 ENCOUNTER — OFFICE VISIT (OUTPATIENT)
Dept: ORTHOPEDIC SURGERY | Age: 74
End: 2019-08-13
Payer: MEDICARE

## 2019-08-13 ENCOUNTER — TELEPHONE (OUTPATIENT)
Dept: ORTHOPEDIC SURGERY | Age: 74
End: 2019-08-13

## 2019-08-13 VITALS
WEIGHT: 226.41 LBS | BODY MASS INDEX: 31.7 KG/M2 | SYSTOLIC BLOOD PRESSURE: 138 MMHG | HEART RATE: 78 BPM | HEIGHT: 71 IN | DIASTOLIC BLOOD PRESSURE: 88 MMHG

## 2019-08-13 DIAGNOSIS — M54.16 LUMBAR RADICULOPATHY: Primary | ICD-10-CM

## 2019-08-13 DIAGNOSIS — M51.26 HNP (HERNIATED NUCLEUS PULPOSUS), LUMBAR: ICD-10-CM

## 2019-08-13 DIAGNOSIS — M51.36 DEGENERATIVE DISC DISEASE, LUMBAR: ICD-10-CM

## 2019-08-13 DIAGNOSIS — G89.4 CHRONIC PAIN SYNDROME: ICD-10-CM

## 2019-08-13 DIAGNOSIS — M48.04 THORACIC STENOSIS: ICD-10-CM

## 2019-08-13 PROCEDURE — G8427 DOCREV CUR MEDS BY ELIG CLIN: HCPCS | Performed by: PHYSICAL MEDICINE & REHABILITATION

## 2019-08-13 PROCEDURE — 3017F COLORECTAL CA SCREEN DOC REV: CPT | Performed by: PHYSICAL MEDICINE & REHABILITATION

## 2019-08-13 PROCEDURE — G8417 CALC BMI ABV UP PARAM F/U: HCPCS | Performed by: PHYSICAL MEDICINE & REHABILITATION

## 2019-08-13 PROCEDURE — 1123F ACP DISCUSS/DSCN MKR DOCD: CPT | Performed by: PHYSICAL MEDICINE & REHABILITATION

## 2019-08-13 PROCEDURE — 4040F PNEUMOC VAC/ADMIN/RCVD: CPT | Performed by: PHYSICAL MEDICINE & REHABILITATION

## 2019-08-13 PROCEDURE — 4004F PT TOBACCO SCREEN RCVD TLK: CPT | Performed by: PHYSICAL MEDICINE & REHABILITATION

## 2019-08-13 PROCEDURE — 99214 OFFICE O/P EST MOD 30 MIN: CPT | Performed by: PHYSICAL MEDICINE & REHABILITATION

## 2019-08-13 PROCEDURE — G8598 ASA/ANTIPLAT THER USED: HCPCS | Performed by: PHYSICAL MEDICINE & REHABILITATION

## 2019-08-13 NOTE — PROGRESS NOTES
and symptoms:   Neurogenic bowel or bladder symptoms:  no   Perceived weakness:  yes   Difficulty walking:  yes    Recent Imaging (within past one year)   Xrays: no   MRI or CT of spine: no    Current/Past Treatment:   · Physical Therapy:  none  · Chiropractic:  none  · Injection:  yes  · Medications:   NSAIDS:  yes   Muscle relaxer:  yes   Steriods:  none   Neuropathic medications:  none   Opioids:  Norco  · Previous surgery:  no  · Previous surgical consult:  no  · Other:  · Infection control  · Tested positive for MRSA in past 12 months:  no  · Tested positive for MSSA \"staph infection\" in past 12 months: no  · Tested positive for VRE (Vancomycin Resistant Enterococci) in past 12 months:   no  · Currently on any antibiotics for an infection: no  · Anticoagulants:  · On a blood thinner:  no   · Any history of bleeding disorder: no   · MRI Contraindication: no   · Previous Pain Management: no             Past Medical History:   Past Medical History:   Diagnosis Date    Anxiety     Back pain     CAD (coronary artery disease)     Cancer (HCC)     skin    CHF (congestive heart failure) (HCC)     COPD (chronic obstructive pulmonary disease) (HCC)     Depression     Diabetes mellitus ([facility]     Heart disease     Hernia     History of blood transfusion     Hyperlipidemia     Hypertension     Osteoarthritis     Prolonged emergence from general anesthesia     Rheumatic fever     Type II or unspecified type diabetes mellitus without mention of complication, not stated as uncontrolled       Past Surgical History:     Past Surgical History:   Procedure Laterality Date    CARDIAC SURGERY      stents x4    CIRCUMCISION      DENTAL SURGERY      EPIDURAL STEROID INJECTION Bilateral 12/17/2018    BILATERAL LUMBAR THREE FOUR EPIDURAL STEROID INJECTION SITE CONFIRMED BY FLUOROSCOPY performed by Dayana Hearn MD at [facility]    [facility] DX/THER SBST INTRLMNR CRV/THRC W/IMG GDN Right 7/31/2018    RIGHT LUMBAR FIVE SIX EPIDURAL STEROID INJECTION SITE CONFIRMED BY FLUOROSCOPY performed by Bindu Solorio MD at [facility] DX/THER SBST INTRLMNR CRV/THRC W/IMG GDN Bilateral 9/18/2018    RIGHT LUMBAR THREE, LUBMAR FOUR, LUMBAR FIVE MEDIAL BRANCH BLOCK SITE CONFIRMED BY FLUOROSCOPY performed by Bindu Solorio MD at [facility] DX/THER SBST INTRLMNR CRV/THRC W/IMG GDN Bilateral 10/8/2018    BILATERAL LUMBAR THREE, LUMBAR FOUR, LUMBAR FIVE RADIOFREQUENCY ABLATION SITE CONFIRMED BY FLUOROSCOPY performed by Bindu Solorio MD at [place] [address]     Current Medications:     Current Outpatient Medications:     Acetaminophen (TYLENOL PO), Take by mouth, Disp: , Rfl:     HYDROcodone-acetaminophen (NORCO) 5-325 MG per tablet, Take 1 tablet by mouth every 8 hours as needed for Pain for up to 30 days. , Disp: 90 tablet, Rfl: 0    gabapentin (NEURONTIN) 300 MG capsule, i po BID & QHS, Disp: 90 capsule, Rfl: 2    omeprazole (PRILOSEC) 20 MG delayed release capsule, TAKE 1 CAPSULE BY MOUTH DAILY, Disp: 90 capsule, Rfl: 0    glimepiride (AMARYL) 4 MG tablet, TAKE 1 TABLET BY MOUTH EVERY MORNING BEFORE BREAKFAST, Disp: 90 tablet, Rfl: 0    furosemide (LASIX) 40 MG tablet, Take 1 tablet by mouth daily, Disp: 90 tablet, Rfl: 0    citalopram (CELEXA) 20 MG tablet, Take 1 tablet by mouth daily, Disp: 90 tablet, Rfl: 0    metFORMIN (GLUCOPHAGE) 500 MG tablet, One tab in am, 1 at pm, Disp: 180 tablet, Rfl: 1    pravastatin (PRAVACHOL) 80 MG tablet, Take 1 tablet by mouth daily, Disp: 90 tablet, Rfl: 3    lisinopril (PRINIVIL;ZESTRIL) 40 MG tablet, Take 1 tablet by mouth daily, Disp: 90 tablet, Rfl: 3    metoprolol succinate (TOPROL XL) 50 MG extended release tablet, Take 1 tablet by mouth daily, Disp: 90 tablet, Rfl: 3    glucose blood VI test strips (JORGE ALBERTO CONTOUR TEST) strip, Once daily.   DX:E11.9, Disp: 100 each, Rfl: 11    nitroGLYCERIN (NITROSTAT) 0.4 MG SL tablet, Place 1 Fairly True   I can socialize with my friends or family members as often as I used to do, despite my pain:    True and False   I can enjoy things even though I have pain:    True and False   I can gradually become more active over the course of the day despite my pain:    True and False   I can still accomplish most of my goals in life despite my pain:    Mostly False   I can still do many of the things I enjoy doing, such as hobbies or leisure activity, despite pain:    True and False   I can cope with my pain without medication:    Slightly False   I can do some form of productive work or household chores despite my pain:    True and False  [place]  Part 1: What is you typical pain intensity?  The pain is moderate at the moment   Part 7: How well can you sleep?  My sleep is occasionally disturbed by pain. Part 3: How well do you lift objects?  I can only lift very light weights. Part 4: How well can you walk?  Pain prevents me from walking more than 100 yards. Part 2: What about how you are able to care for yourself?  I can look after myself normally without causing extra pain   Part 8: How is your social life affected?  Pain has restricted my social life and I do not go out as often. Part 9: How is you ability to travel?  Pain is bad but I manage journeys over two hours. Part 5: How well can you sit?  Pain prevents me from sitting more than one hour. Part 6: How well can you stand?  Pain prevents me from standing for more than 1 hour.           Diagnostic Testing:    Xrays:   I personally reviewed images of the lumbar spine from 5/29/19     FINDINGS:   On the lateral view, a smooth lordotic curve is seen.  No fracture   destructive bony lesion is present.  Moderate or severe narrowing of the   lumbar interspaces is present.  A few vacuum discs are present.  Moderate   spurring is present at T12-L1.  Otherwise slight to mild endplate bony   spurring is present. [place] Harrison (L) 10 - 40 U/L    AST 14 (L) 15 - 37 U/L    Globulin 2.1 g/dL   HEMOGLOBIN A1C   Result Value Ref Range    Hemoglobin A1C 7.2 See comment %    eAG 159.9 mg/dL       Impression (Medical Decision Making):       1. Chronic pain syndrome    2. Lumbar radiculopathy    3. Degenerative disc disease, lumbar    4. HNP (herniated nucleus pulposus), lumbar    5. Thoracic stenosis        Plan (Medical Decision Making):    I discussed the diagnosis and the treatment options with Jonelle Morales today. In Summary:  The various treatment options were outlined and discussed with Jonelle Morales including:  Conservative care options: physical therapy, ice, medications, bracing, and activity modification. The indications for therapeutic injections. The indications for additional imaging/laboratory studies. The indications for (possible future) interventions. After considering the various options discussed, Jonelle Andrew elected to pursue a course of treatment that includes the followin. Medications: No further recommendations for new medications. 2. PT:  Encouraged to continue with HEP. 3. Further studies:  Setup Thoracic MR without contrast to evaluate for soft tissue pathology or stenosis contributing to the back pain and paresthesia. The patient has failed a six week trial of a HEP program within the last 6 months. 4. Interventional:  Patient will have a spinal cord stimulator trial with [address]. The and has had 30+ years of chronic lower back and leg pain. He has failed multiple steroid injections and medial branch blocks.   He has had a psychological evaluation through [facility] in our office today and it has been determined that he will be an appropriate candidate for a SCS trial.     5. Healthy Lifestyle Measures:  Patient education material reviewing the following was distributed to Jonelle Morales  Anatomic drawings  Healthy lifestyle education  Osteoporosis prevention,   Back and

## 2019-08-13 NOTE — LETTER
Please schedule the following with:     Date:   tbd (2019)    Account: [id number]  Patient: Dedrick Morales    : 1945  Address:  [place] [address] [address]    Phone (H):  [phone] (home)      ----------------------------------------------------------------------------------------------  Diagnosis:     ICD-10-CM    1. Chronic pain syndrome G89.4    2. Lumbar radiculopathy M54.16    3. Degenerative disc disease, lumbar M51.36    4. HNP (herniated nucleus pulposus), lumbar M51.26    5. Thoracic stenosis M48.04          Levels: Lumbar   Procedure type: Spinal Cord Stimulator Trial   Side: Midline   CPT Codes 56558, [id number], [id number]    ----------------------------------------------------------------------------------------------  Injection [email or web address]    Attending Physician       Marli Cast.  Dayana Pennington MD.      ----------------------------------------------------------------------------------------------  Injection Scheduled For:    At:    1st Insurance mcr    Pre-Cert#    2nd Insurance  for life   Pre-Cert#    Comments or Special instructions:    · Infection control  · Tested positive for MRSA in past 12 months:  no  · Tested positive for MSSA \"staph infection\" in past 12 months: no  · Tested positive for VRE (Vancomycin Resistant Enterococci) in past 12 months:   no  · Currently on any antibiotics for an infection: no  · Anticoagulants:  · On a blood thinner:  yes , ASA hold 7 days prior  · Any history of bleeding disorder: no   · Advanced Liver disease: no   · Advanced Renal disease: no   · Glaucoma: no   · Diabetes: yes     Sedation:  Yes  -----------------------------------------------------------------------------------------------  Allergies   Allergen Reactions    Latex Rash    Methadone Other (See Comments)     Pt collapsed and was hospitalized for 11 days; organ shutdown    Morphine Rash     Skin peeling off    Wellbutrin [Bupropion Hcl]

## 2019-08-16 ENCOUNTER — TELEPHONE (OUTPATIENT)
Dept: ORTHOPEDIC SURGERY | Age: 74
End: 2019-08-16

## 2019-08-20 ENCOUNTER — OFFICE VISIT (OUTPATIENT)
Dept: ORTHOPEDIC SURGERY | Age: 74
End: 2019-08-20
Payer: MEDICARE

## 2019-08-20 ENCOUNTER — HOSPITAL ENCOUNTER (OUTPATIENT)
Dept: MRI IMAGING | Age: 74
Discharge: HOME | End: 2019-08-20
Payer: MEDICARE

## 2019-08-20 VITALS
HEART RATE: 80 BPM | DIASTOLIC BLOOD PRESSURE: 84 MMHG | HEIGHT: 71 IN | WEIGHT: 220.02 LBS | BODY MASS INDEX: 30.8 KG/M2 | SYSTOLIC BLOOD PRESSURE: 136 MMHG

## 2019-08-20 DIAGNOSIS — M54.16 LUMBAR RADICULOPATHY: ICD-10-CM

## 2019-08-20 DIAGNOSIS — M48.04 THORACIC STENOSIS: ICD-10-CM

## 2019-08-20 DIAGNOSIS — Z01.89 ROUTINE LAB DRAW: ICD-10-CM

## 2019-08-20 DIAGNOSIS — M51.26 HNP (HERNIATED NUCLEUS PULPOSUS), LUMBAR: ICD-10-CM

## 2019-08-20 DIAGNOSIS — G89.4 CHRONIC PAIN SYNDROME: Primary | ICD-10-CM

## 2019-08-20 DIAGNOSIS — G89.4 CHRONIC PAIN SYNDROME: ICD-10-CM

## 2019-08-20 DIAGNOSIS — M51.36 DEGENERATIVE DISC DISEASE, LUMBAR: ICD-10-CM

## 2019-08-20 PROCEDURE — G8598 ASA/ANTIPLAT THER USED: HCPCS | Performed by: PHYSICAL MEDICINE & REHABILITATION

## 2019-08-20 PROCEDURE — 4040F PNEUMOC VAC/ADMIN/RCVD: CPT | Performed by: PHYSICAL MEDICINE & REHABILITATION

## 2019-08-20 PROCEDURE — 99214 OFFICE O/P EST MOD 30 MIN: CPT | Performed by: PHYSICAL MEDICINE & REHABILITATION

## 2019-08-20 PROCEDURE — G8427 DOCREV CUR MEDS BY ELIG CLIN: HCPCS | Performed by: PHYSICAL MEDICINE & REHABILITATION

## 2019-08-20 PROCEDURE — 3017F COLORECTAL CA SCREEN DOC REV: CPT | Performed by: PHYSICAL MEDICINE & REHABILITATION

## 2019-08-20 PROCEDURE — G8417 CALC BMI ABV UP PARAM F/U: HCPCS | Performed by: PHYSICAL MEDICINE & REHABILITATION

## 2019-08-20 PROCEDURE — 72146 MRI CHEST SPINE W/O DYE: CPT

## 2019-08-20 PROCEDURE — 4004F PT TOBACCO SCREEN RCVD TLK: CPT | Performed by: PHYSICAL MEDICINE & REHABILITATION

## 2019-08-20 PROCEDURE — 1123F ACP DISCUSS/DSCN MKR DOCD: CPT | Performed by: PHYSICAL MEDICINE & REHABILITATION

## 2019-08-20 NOTE — PROGRESS NOTES
citalopram (CELEXA) 20 MG tablet, Take 1 tablet by mouth daily, Disp: 90 tablet, Rfl: 0    metFORMIN (GLUCOPHAGE) 500 MG tablet, One tab in am, 1 at pm, Disp: 180 tablet, Rfl: 1    pravastatin (PRAVACHOL) 80 MG tablet, Take 1 tablet by mouth daily, Disp: 90 tablet, Rfl: 3    lisinopril (PRINIVIL;ZESTRIL) 40 MG tablet, Take 1 tablet by mouth daily, Disp: 90 tablet, Rfl: 3    metoprolol succinate (TOPROL XL) 50 MG extended release tablet, Take 1 tablet by mouth daily, Disp: 90 tablet, Rfl: 3    glucose blood VI test strips (JORGE ALBERTO CONTOUR TEST) strip, Once daily. DX:E11.9, Disp: 100 each, Rfl: 11    nitroGLYCERIN (NITROSTAT) 0.4 MG SL tablet, Place 1 tablet under the tongue every 5 minutes as needed for Chest pain., Disp: 25 tablet, Rfl: 3    albuterol (PROVENTIL HFA;VENTOLIN HFA) 108 (90 BASE) MCG/ACT inhaler, Inhale 2 puffs into the lungs every 6 hours as needed for Wheezing., Disp: 1 Inhaler, Rfl: 2    aspirin 81 MG EC tablet, Take 81 mg by mouth daily. Indications: stopped for surgery, Disp: , Rfl:     gabapentin (NEURONTIN) 300 MG capsule, i po BID & QHS, Disp: 90 capsule, Rfl: 2  Allergies:  Latex; Methadone; Morphine; and Wellbutrin [bupropion hcl]  Social History:    reports that he has been smoking cigarettes. He has a 55.00 pack-year smoking history. He has never used smokeless tobacco. He reports that he does not drink alcohol or use drugs. Family History:   Family History   Problem Relation Age of Onset    Heart Disease Mother     Diabetes Father        REVIEW OF SYSTEMS:   CONSTITUTIONAL: Denies unexplained weight loss, fevers, chills or fatigue  NEUROLOGICAL: Denies unsteady gait or progressive weakness  MUSCULOSKELETAL: Denies joint swelling or redness  GI: Denies nausea, vomiting, diarrhea   : Denies bowel or bladder issues       PHYSICAL EXAM:    Vitals: Blood pressure 136/84, pulse 80, height 5' 10.98\" (1.803 m), weight 220 lb 0.3 oz (99.8 kg).     GENERAL EXAM:  · General Apparence: Reflexes are symmetrically 2+ at the patellar and ankle tendons. Clonus absent bilaterally at the feet. · Gait & station: normal, patient ambulates without assistance and no ataxia  · Additional Examinations:  · RIGHT LOWER EXTREMITY: Inspection/examination of the right lower extremity does not show any tenderness, deformity or injury. Range of motion is normal and pain-free. There is no gross instability. There are no rashes, ulcerations or lesions. Strength and tone are normal. No atrophy or abnormal movements are noted. · LEFT LOWER EXTREMITY:  Inspection/examination of the left lower extremity does not show any tenderness, deformity or injury. Range of motion is normal and pain-free. There is no gross instability. There are no rashes, ulcerations or lesions. Strength and tone are normal. No atrophy or abnormal movements are noted. Diagnostic Testing:    MR Thoracic is pending  Results for orders placed or performed in visit on 04/18/19   COMPREHENSIVE METABOLIC PANEL   Result Value Ref Range    Sodium 134 (L) 136 - 145 mmol/L    Potassium 5.5 (H) 3.5 - 5.1 mmol/L    Chloride 97 (L) 99 - 110 mmol/L    CO2 23 21 - 32 mmol/L    Anion Gap 14 3 - 16    Glucose 155 (H) 70 - 99 mg/dL    BUN 11 7 - 20 mg/dL    CREATININE 0.9 0.8 - 1.3 mg/dL    GFR Non-African American >60 >60    GFR African American >60 >60    Calcium 9.8 8.3 - 10.6 mg/dL    Total Protein 6.8 6.4 - 8.2 g/dL    Alb 4.7 3.4 - 5.0 g/dL    Albumin/Globulin Ratio 2.2 1.1 - 2.2    Total Bilirubin 0.6 0.0 - 1.0 mg/dL    Alkaline Phosphatase 91 40 - 129 U/L    ALT 8 (L) 10 - 40 U/L    AST 14 (L) 15 - 37 U/L    Globulin 2.1 g/dL   HEMOGLOBIN A1C   Result Value Ref Range    Hemoglobin A1C 7.2 See comment %    eAG 159.9 mg/dL     Impression:       1. Chronic pain syndrome    2. Lumbar radiculopathy    3. Degenerative disc disease, lumbar    4. HNP (herniated nucleus pulposus), lumbar    5.  Routine lab draw        Plan:  Clinical Course: Above diagnoses are worsening    I discussed the diagnosis and the treatment options with Lauren Tobias today. In Summary:  The various treatment options were outlined and discussed with Lauren Tobias including:  Conservative care options: physical therapy, ice, medications, bracing, and activity modification. The indications for therapeutic injections. The indications for additional imaging/laboratory studies. The indications for (possible future) interventions. After considering the various options discussed, Lauren Tobias elected to pursue a course of treatment that includes the followin. Medications:  No further recommendations for new medications. 2. PT:  Encouraged to continue with HEP. 3. Further studies:  Obtain PTT/INR/CBC prior to SCS Trial    4. Interventional:  Have discussed risks benefits alternatives of a spinal cord stimulator trial.  Risks include but limited to bleeding, infection, increased pain, lack of pain relief and nerve injury. He verbalized understanding like to proceed    5. Follow up:  1 week      Lauren Tobias was instructed to call the office if his symptoms worsen or if new symptoms appear prior to the next scheduled visit. He is specifically instructed to contact the office between now & his scheduled appointment if he has concerns related to his condition or if he needs assistance in scheduling the above tests. He is welcome to call for an appointment sooner if he has any additional concerns or questions. Rosmery Alexis ATC, am scribing for and in the presence of Dr. Marie Gonzales. 19 5:04 PM Nader Swanson ATC    The physical examination was performed between the patient and Dr. Marie Gonzales. All counseling during the appointment was performed between the patient and provider. I, Dr. Meri Lew.  Malik, personally performed the services described in this documentation as scribed by IAN Walker in my presence and it is both accurate and complete. Cristel Pringle. Francis Paulino MD, FLAVIA, [facility]  Board Certified in [address] [place] [address]  Certified and Fellowship Trained in [facility] ([facility])             This dictation was performed with a verbal recognition program [facility]) and it was checked for errors. It is possible that there are still dictated errors within this office note. If so, please bring any errors to my attention for an addendum. All efforts were made to ensure that this office note is accurate.

## 2019-08-22 ENCOUNTER — HOSPITAL ENCOUNTER (OUTPATIENT)
Age: 74
Discharge: HOME | End: 2019-08-22
Payer: MEDICARE

## 2019-08-22 DIAGNOSIS — Z01.89 ROUTINE LAB DRAW: ICD-10-CM

## 2019-08-22 LAB
APTT: 30.2 SEC (ref 26–36)
HCT VFR BLD CALC: 37 % (ref 40.5–52.5)
HEMOGLOBIN: 11.8 G/DL (ref 13.5–17.5)
INR BLD: 1.05 (ref 0.86–1.14)
MCH RBC QN AUTO: 25.5 PG (ref 26–34)
MCHC RBC AUTO-ENTMCNC: 31.8 G/DL (ref 31–36)
MCV RBC AUTO: 80.1 FL (ref 80–100)
PDW BLD-RTO: 16 % (ref 12.4–15.4)
PLATELET # BLD: 300 K/UL (ref 135–450)
PMV BLD AUTO: 7.6 FL (ref 5–10.5)
PROTHROMBIN TIME: 12 SEC (ref 9.8–13)
RBC # BLD: 4.62 M/UL (ref 4.2–5.9)
WBC # BLD: 7.8 K/UL (ref 4–11)

## 2019-08-22 PROCEDURE — 36415 COLL VENOUS BLD VENIPUNCTURE: CPT

## 2019-08-22 PROCEDURE — 85730 THROMBOPLASTIN TIME PARTIAL: CPT

## 2019-08-22 PROCEDURE — 85610 PROTHROMBIN TIME: CPT

## 2019-08-22 PROCEDURE — 85027 COMPLETE CBC AUTOMATED: CPT

## 2019-08-22 RX ORDER — CEPHALEXIN 500 MG/1
500 CAPSULE ORAL 3 TIMES DAILY
Qty: 12 CAPSULE | Refills: 0 | Status: SHIPPED | OUTPATIENT
Start: 2019-08-27 | End: 2019-08-31

## 2019-08-23 ENCOUNTER — TELEPHONE (OUTPATIENT)
Dept: ORTHOPEDIC SURGERY | Age: 74
End: 2019-08-23

## 2019-08-23 DIAGNOSIS — I71.20 THORACIC AORTIC ANEURYSM WITHOUT RUPTURE: Primary | ICD-10-CM

## 2019-08-26 ENCOUNTER — TELEPHONE (OUTPATIENT)
Dept: INTERNAL MEDICINE CLINIC | Age: 74
End: 2019-08-26

## 2019-08-27 ENCOUNTER — HOSPITAL ENCOUNTER (OUTPATIENT)
Age: 74
Setting detail: OUTPATIENT SURGERY
Discharge: HOME | End: 2019-08-27
Attending: PHYSICAL MEDICINE & REHABILITATION | Admitting: PHYSICAL MEDICINE & REHABILITATION
Payer: MEDICARE

## 2019-08-27 VITALS
WEIGHT: 220 LBS | HEIGHT: 71 IN | SYSTOLIC BLOOD PRESSURE: 154 MMHG | HEART RATE: 70 BPM | TEMPERATURE: 97.5 F | RESPIRATION RATE: 16 BRPM | DIASTOLIC BLOOD PRESSURE: 78 MMHG | BODY MASS INDEX: 30.8 KG/M2 | OXYGEN SATURATION: 98 %

## 2019-08-27 LAB
GLUCOSE BLD-MCNC: 99 MG/DL (ref 70–99)
PERFORMED ON: NORMAL

## 2019-08-27 PROCEDURE — 3600000012 HC SURGERY LEVEL 2 ADDTL 15MIN: Performed by: PHYSICAL MEDICINE & REHABILITATION

## 2019-08-27 PROCEDURE — 2500000003 HC RX 250 WO HCPCS: Performed by: PHYSICAL MEDICINE & REHABILITATION

## 2019-08-27 PROCEDURE — 3600000002 HC SURGERY LEVEL 2 BASE: Performed by: PHYSICAL MEDICINE & REHABILITATION

## 2019-08-27 PROCEDURE — C1778 LEAD, NEUROSTIMULATOR: HCPCS | Performed by: PHYSICAL MEDICINE & REHABILITATION

## 2019-08-27 PROCEDURE — 99153 MOD SED SAME PHYS/QHP EA: CPT | Performed by: PHYSICAL MEDICINE & REHABILITATION

## 2019-08-27 PROCEDURE — 7100000011 HC PHASE II RECOVERY - ADDTL 15 MIN: Performed by: PHYSICAL MEDICINE & REHABILITATION

## 2019-08-27 PROCEDURE — 2720000010 HC SURG SUPPLY STERILE: Performed by: PHYSICAL MEDICINE & REHABILITATION

## 2019-08-27 PROCEDURE — 6360000002 HC RX W HCPCS

## 2019-08-27 PROCEDURE — 2709999900 HC NON-CHARGEABLE SUPPLY: Performed by: PHYSICAL MEDICINE & REHABILITATION

## 2019-08-27 PROCEDURE — 99152 MOD SED SAME PHYS/QHP 5/>YRS: CPT | Performed by: PHYSICAL MEDICINE & REHABILITATION

## 2019-08-27 PROCEDURE — 2580000003 HC RX 258: Performed by: PHYSICAL MEDICINE & REHABILITATION

## 2019-08-27 PROCEDURE — 6360000002 HC RX W HCPCS: Performed by: PHYSICAL MEDICINE & REHABILITATION

## 2019-08-27 PROCEDURE — 7100000010 HC PHASE II RECOVERY - FIRST 15 MIN: Performed by: PHYSICAL MEDICINE & REHABILITATION

## 2019-08-27 DEVICE — 50CM 16 CONTACT TRIAL LEAD KIT
Type: IMPLANTABLE DEVICE | Status: FUNCTIONAL
Brand: INFINION™  16

## 2019-08-27 RX ORDER — CEFAZOLIN SODIUM 1 G/3ML
INJECTION, POWDER, FOR SOLUTION INTRAMUSCULAR; INTRAVENOUS
Status: COMPLETED
Start: 2019-08-27 | End: 2019-08-27

## 2019-08-27 RX ORDER — SODIUM CHLORIDE, SODIUM LACTATE, POTASSIUM CHLORIDE, CALCIUM CHLORIDE 600; 310; 30; 20 MG/100ML; MG/100ML; MG/100ML; MG/100ML
INJECTION, SOLUTION INTRAVENOUS ONCE
Status: COMPLETED | OUTPATIENT
Start: 2019-08-27 | End: 2019-08-27

## 2019-08-27 RX ORDER — LIDOCAINE HYDROCHLORIDE 10 MG/ML
INJECTION, SOLUTION EPIDURAL; INFILTRATION; INTRACAUDAL; PERINEURAL PRN
Status: DISCONTINUED | OUTPATIENT
Start: 2019-08-27 | End: 2019-08-27 | Stop reason: ALTCHOICE

## 2019-08-27 RX ORDER — FENTANYL CITRATE 50 UG/ML
INJECTION, SOLUTION INTRAMUSCULAR; INTRAVENOUS PRN
Status: DISCONTINUED | OUTPATIENT
Start: 2019-08-27 | End: 2019-08-27 | Stop reason: ALTCHOICE

## 2019-08-27 RX ORDER — MIDAZOLAM HYDROCHLORIDE 1 MG/ML
INJECTION INTRAMUSCULAR; INTRAVENOUS PRN
Status: DISCONTINUED | OUTPATIENT
Start: 2019-08-27 | End: 2019-08-27 | Stop reason: ALTCHOICE

## 2019-08-27 RX ADMIN — SODIUM CHLORIDE, POTASSIUM CHLORIDE, SODIUM LACTATE AND CALCIUM CHLORIDE: 600; 310; 30; 20 INJECTION, SOLUTION INTRAVENOUS at 10:04

## 2019-08-27 RX ADMIN — CEFAZOLIN 2000 MG: 330 INJECTION, POWDER, FOR SOLUTION INTRAMUSCULAR; INTRAVENOUS at 10:06

## 2019-08-27 RX ADMIN — LIDOCAINE HYDROCHLORIDE 0.1 ML: 10 INJECTION, SOLUTION EPIDURAL; INFILTRATION; INTRACAUDAL; PERINEURAL at 10:04

## 2019-08-27 ASSESSMENT — PAIN DESCRIPTION - ONSET: ONSET: ON-GOING

## 2019-08-27 ASSESSMENT — PAIN DESCRIPTION - LOCATION: LOCATION: BACK

## 2019-08-27 ASSESSMENT — PAIN DESCRIPTION - ORIENTATION: ORIENTATION: LOWER

## 2019-08-27 ASSESSMENT — PAIN DESCRIPTION - DESCRIPTORS: DESCRIPTORS: CONSTANT;ACHING

## 2019-08-27 ASSESSMENT — PAIN DESCRIPTION - PROGRESSION: CLINICAL_PROGRESSION: NOT CHANGED

## 2019-08-27 ASSESSMENT — PAIN DESCRIPTION - FREQUENCY: FREQUENCY: CONTINUOUS

## 2019-08-27 ASSESSMENT — PAIN SCALES - GENERAL: PAINLEVEL_OUTOF10: 4

## 2019-08-27 ASSESSMENT — PAIN DESCRIPTION - PAIN TYPE: TYPE: CHRONIC PAIN

## 2019-08-29 ENCOUNTER — TELEPHONE (OUTPATIENT)
Dept: ORTHOPEDIC SURGERY | Age: 74
End: 2019-08-29

## 2019-09-03 ENCOUNTER — OFFICE VISIT (OUTPATIENT)
Dept: ORTHOPEDIC SURGERY | Age: 74
End: 2019-09-03

## 2019-09-03 VITALS
BODY MASS INDEX: 30.8 KG/M2 | WEIGHT: 220.02 LBS | SYSTOLIC BLOOD PRESSURE: 136 MMHG | DIASTOLIC BLOOD PRESSURE: 84 MMHG | HEIGHT: 71 IN

## 2019-09-03 DIAGNOSIS — F51.01 PRIMARY INSOMNIA: ICD-10-CM

## 2019-09-03 DIAGNOSIS — M54.16 LUMBAR RADICULOPATHY: ICD-10-CM

## 2019-09-03 DIAGNOSIS — G89.4 CHRONIC PAIN SYNDROME: Primary | ICD-10-CM

## 2019-09-03 DIAGNOSIS — M51.26 HNP (HERNIATED NUCLEUS PULPOSUS), LUMBAR: ICD-10-CM

## 2019-09-03 DIAGNOSIS — M51.36 DEGENERATIVE DISC DISEASE, LUMBAR: ICD-10-CM

## 2019-09-03 PROCEDURE — 99024 POSTOP FOLLOW-UP VISIT: CPT | Performed by: PHYSICAL MEDICINE & REHABILITATION

## 2019-09-03 NOTE — PROGRESS NOTES
Follow up: Sally Mendoza  1945  [id number]         Chief Complaint   Patient presents with    Lower Back Pain     SCS trial 8/27/19         HISTORY OF PRESENT ILLNESS:  Mr. Jonathon Shane is a 69 y.o. male returns for a follow up visit for multiple medical problems. His current presenting problems are   1. Chronic pain syndrome    2. Lumbar radiculopathy    3. Degenerative disc disease, lumbar    4. HNP (herniated nucleus pulposus), lumbar    . As per information/history obtained from the PADT(patient assessment and documentation tool) - He complains of pain in the lower back with radiation to the upper leg Left and lower leg Left He rates the pain 3/10 and describes it as aching. Pain is made worse by: movement. He denies side effects from the current pain regimen. Patient reports that since the last follow up visit the physical functioning is better, family/social relationships are better, mood is better and sleep patterns are better, and that the overall functioning is better. Patient denies neurological bowel or bladder. Patient presents today for follow up of low back and left leg pain. He recently underwent an SCS trial on 8/27/19 and notes overall being 65-70% improved. He is very happy with the results of the spinal cord stimulator trial which is a total of 7 days.     Associated signs and symptoms:   Neurogenic bowel or bladder symptoms:  no   Perceived weakness:  yes   Difficulty walking:  yes              Past Medical History:   Past Medical History:   Diagnosis Date    Anxiety     Back pain     CAD (coronary artery disease)     Cancer (HCC)     skin    CHF (congestive heart failure) (HCC)     COPD (chronic obstructive pulmonary disease) (HCC)     Depression     Diabetes mellitus ([address]     Heart disease     Hernia     History of blood transfusion     Hyperlipidemia     Hypertension     Osteoarthritis     Prolonged emergence from general anesthesia     Rheumatic fever     Take 1 tablet by mouth daily, Disp: 90 tablet, Rfl: 0    metFORMIN (GLUCOPHAGE) 500 MG tablet, One tab in am, 1 at pm, Disp: 180 tablet, Rfl: 1    pravastatin (PRAVACHOL) 80 MG tablet, Take 1 tablet by mouth daily, Disp: 90 tablet, Rfl: 3    lisinopril (PRINIVIL;ZESTRIL) 40 MG tablet, Take 1 tablet by mouth daily, Disp: 90 tablet, Rfl: 3    metoprolol succinate (TOPROL XL) 50 MG extended release tablet, Take 1 tablet by mouth daily, Disp: 90 tablet, Rfl: 3    glucose blood VI test strips (JORGE ALBERTO CONTOUR TEST) strip, Once daily. DX:E11.9, Disp: 100 each, Rfl: 11    nitroGLYCERIN (NITROSTAT) 0.4 MG SL tablet, Place 1 tablet under the tongue every 5 minutes as needed for Chest pain., Disp: 25 tablet, Rfl: 3    albuterol (PROVENTIL HFA;VENTOLIN HFA) 108 (90 BASE) MCG/ACT inhaler, Inhale 2 puffs into the lungs every 6 hours as needed for Wheezing., Disp: 1 Inhaler, Rfl: 2    aspirin 81 MG EC tablet, Take 81 mg by mouth daily. Indications: stopped for surgery, Disp: , Rfl:   Allergies:  Latex; Methadone; Morphine; and Wellbutrin [bupropion hcl]  Social History:    reports that he has been smoking cigarettes. He has a 55.00 pack-year smoking history. He has never used smokeless tobacco. He reports that he does not drink alcohol or use drugs. Family History:   Family History   Problem Relation Age of Onset    Heart Disease Mother     Diabetes Father        REVIEW OF SYSTEMS:   CONSTITUTIONAL: Denies unexplained weight loss, fevers, chills or fatigue  NEUROLOGICAL: Denies unsteady gait or progressive weakness  MUSCULOSKELETAL: Denies joint swelling or redness  GI: Denies nausea, vomiting, diarrhea   : Denies bowel or bladder issues       PHYSICAL EXAM:    Vitals: Blood pressure 136/84, height 5' 10.98\" (1.803 m), weight 220 lb 0.3 oz (99.8 kg). Patient was placed in the prone position 2 leads were removed intact. There is no signs of erythema warmth or drainage. The skin was cleansed with alcohol.

## 2019-09-04 RX ORDER — ZOLPIDEM TARTRATE 10 MG/1
TABLET ORAL
Qty: 90 TABLET | Refills: 0 | Status: SHIPPED | OUTPATIENT
Start: 2019-09-04 | End: 2019-11-29 | Stop reason: SDUPTHER

## 2019-09-09 RX ORDER — GLIMEPIRIDE 4 MG/1
TABLET ORAL
Qty: 90 TABLET | Refills: 0 | Status: SHIPPED | OUTPATIENT
Start: 2019-09-09 | End: 2019-12-09 | Stop reason: SDUPTHER

## 2019-09-09 RX ORDER — METOPROLOL SUCCINATE 50 MG/1
50 TABLET, EXTENDED RELEASE ORAL DAILY
Qty: 90 TABLET | Refills: 3 | Status: ON HOLD | OUTPATIENT
Start: 2019-09-09 | End: 2020-05-23 | Stop reason: HOSPADM

## 2019-09-11 ENCOUNTER — TELEPHONE (OUTPATIENT)
Dept: PULMONOLOGY | Age: 74
End: 2019-09-11

## 2019-09-16 DIAGNOSIS — G89.29 CHRONIC MIDLINE LOW BACK PAIN WITHOUT SCIATICA: ICD-10-CM

## 2019-09-16 DIAGNOSIS — M54.50 CHRONIC MIDLINE LOW BACK PAIN WITHOUT SCIATICA: ICD-10-CM

## 2019-09-16 RX ORDER — HYDROCODONE BITARTRATE AND ACETAMINOPHEN 5; 325 MG/1; MG/1
1 TABLET ORAL EVERY 8 HOURS PRN
Qty: 90 TABLET | Refills: 0 | Status: SHIPPED | OUTPATIENT
Start: 2019-09-16 | End: 2019-10-18 | Stop reason: SDUPTHER

## 2019-09-21 ENCOUNTER — HOSPITAL ENCOUNTER (OUTPATIENT)
Dept: CT IMAGING | Age: 74
Discharge: HOME | End: 2019-09-21
Payer: MEDICARE

## 2019-09-21 DIAGNOSIS — R91.1 PULMONARY NODULE: ICD-10-CM

## 2019-09-21 DIAGNOSIS — J84.9 ILD (INTERSTITIAL LUNG DISEASE) (HCC): ICD-10-CM

## 2019-09-21 PROCEDURE — 71250 CT THORAX DX C-: CPT

## 2019-09-24 RX ORDER — LISINOPRIL 40 MG/1
40 TABLET ORAL DAILY
Qty: 90 TABLET | Refills: 3 | Status: ON HOLD | OUTPATIENT
Start: 2019-09-24 | End: 2020-05-23 | Stop reason: HOSPADM

## 2019-09-24 NOTE — TELEPHONE ENCOUNTER
Pt's wife Chichi Hair said that [address] has sent out a request 3 times asking for refill of rx:  Linsinopril, but they have never heard back from our office. Pt has no more of this rx left, took last tab yesterday, and is concerned about going w/out it. This pharmacy closes at 6 pm tonight.   Please call pharmacy asap

## 2019-09-30 ENCOUNTER — HOSPITAL ENCOUNTER (OUTPATIENT)
Dept: PULMONOLOGY | Age: 74
Discharge: HOME | End: 2019-09-30
Payer: MEDICARE

## 2019-09-30 VITALS — OXYGEN SATURATION: 97 %

## 2019-09-30 DIAGNOSIS — J84.9 ILD (INTERSTITIAL LUNG DISEASE) (HCC): ICD-10-CM

## 2019-09-30 DIAGNOSIS — R91.1 PULMONARY NODULE: ICD-10-CM

## 2019-09-30 LAB
DLCO %PRED: 44 %
DLCO PRED: NORMAL ML/MIN/MMHG
DLCO/VA %PRED: NORMAL %
DLCO/VA PRED: NORMAL ML/MIN/MMHG
DLCO/VA: NORMAL ML/MIN/MMHG
DLCO: NORMAL ML/MIN/MMHG
EXPIRATORY TIME-POST: NORMAL SEC
EXPIRATORY TIME: NORMAL SEC
FEF 25-75% %CHNG: NORMAL
FEF 25-75% %PRED-POST: NORMAL %
FEF 25-75% %PRED-PRE: NORMAL L/SEC
FEF 25-75% PRED: NORMAL L/SEC
FEF 25-75%-POST: NORMAL L/SEC
FEF 25-75%-PRE: NORMAL L/SEC
FEV1 %PRED-POST: 71 %
FEV1 %PRED-PRE: 70 %
FEV1 PRED: NORMAL L
FEV1-POST: NORMAL L
FEV1-PRE: NORMAL L
FEV1/FVC %PRED-POST: NORMAL %
FEV1/FVC %PRED-PRE: NORMAL %
FEV1/FVC PRED: NORMAL %
FEV1/FVC-POST: 69 %
FEV1/FVC-PRE: 67 %
FVC %PRED-POST: NORMAL L
FVC %PRED-PRE: NORMAL %
FVC PRED: NORMAL L
FVC-POST: NORMAL L
FVC-PRE: NORMAL L
GAW %PRED: NORMAL %
GAW PRED: NORMAL L/S/CMH2O
GAW: NORMAL L/S/CMH2O
IC %PRED: NORMAL %
IC PRED: NORMAL L
IC: NORMAL L
MEP: NORMAL
MIP: NORMAL
MVV %PRED-PRE: NORMAL %
MVV PRED: NORMAL L/MIN
MVV-PRE: NORMAL L/MIN
PEF %PRED-POST: NORMAL %
PEF %PRED-PRE: NORMAL L/SEC
PEF PRED: NORMAL L/SEC
PEF%CHNG: NORMAL
PEF-POST: NORMAL L/SEC
PEF-PRE: NORMAL L/SEC
RAW %PRED: NORMAL %
RAW PRED: NORMAL CMH2O/L/S
RAW: NORMAL CMH2O/L/S
RV %PRED: NORMAL %
RV PRED: NORMAL L
RV: NORMAL L
SVC %PRED: NORMAL %
SVC PRED: NORMAL L
SVC: NORMAL L
TLC %PRED: 77 %
TLC PRED: NORMAL L
TLC: NORMAL L
VA %PRED: NORMAL %
VA PRED: NORMAL L
VA: NORMAL L
VTG %PRED: NORMAL %
VTG PRED: NORMAL L
VTG: NORMAL L

## 2019-09-30 PROCEDURE — 6360000002 HC RX W HCPCS: Performed by: INTERNAL MEDICINE

## 2019-09-30 PROCEDURE — 94060 EVALUATION OF WHEEZING: CPT

## 2019-09-30 PROCEDURE — 94727 GAS DIL/WSHOT DETER LNG VOL: CPT

## 2019-09-30 PROCEDURE — 94760 N-INVAS EAR/PLS OXIMETRY 1: CPT

## 2019-09-30 PROCEDURE — 94640 AIRWAY INHALATION TREATMENT: CPT

## 2019-09-30 PROCEDURE — 94729 DIFFUSING CAPACITY: CPT

## 2019-09-30 RX ORDER — ALBUTEROL SULFATE 2.5 MG/3ML
2.5 SOLUTION RESPIRATORY (INHALATION) ONCE
Status: COMPLETED | OUTPATIENT
Start: 2019-09-30 | End: 2019-09-30

## 2019-09-30 RX ADMIN — ALBUTEROL SULFATE 2.5 MG: 2.5 SOLUTION RESPIRATORY (INHALATION) at 11:02

## 2019-09-30 ASSESSMENT — PULMONARY FUNCTION TESTS
FEV1_PERCENT_PREDICTED_POST: 71
FEV1/FVC_POST: 69
FEV1_PERCENT_PREDICTED_PRE: 70
FEV1/FVC_PRE: 67

## 2019-10-02 ENCOUNTER — OFFICE VISIT (OUTPATIENT)
Dept: PULMONOLOGY | Age: 74
End: 2019-10-02
Payer: MEDICARE

## 2019-10-02 ENCOUNTER — NURSE ONLY (OUTPATIENT)
Dept: INTERNAL MEDICINE CLINIC | Age: 74
End: 2019-10-02

## 2019-10-02 ENCOUNTER — TELEPHONE (OUTPATIENT)
Dept: PULMONOLOGY | Age: 74
End: 2019-10-02

## 2019-10-02 VITALS
SYSTOLIC BLOOD PRESSURE: 142 MMHG | WEIGHT: 227 LBS | DIASTOLIC BLOOD PRESSURE: 80 MMHG | RESPIRATION RATE: 20 BRPM | BODY MASS INDEX: 31.78 KG/M2 | HEIGHT: 71 IN | OXYGEN SATURATION: 98 % | HEART RATE: 85 BPM

## 2019-10-02 DIAGNOSIS — R94.2 ABNORMAL PET SCAN, LUNG: ICD-10-CM

## 2019-10-02 DIAGNOSIS — R91.1 PULMONARY NODULE: Primary | ICD-10-CM

## 2019-10-02 DIAGNOSIS — Z23 NEED FOR INFLUENZA VACCINATION: Primary | ICD-10-CM

## 2019-10-02 PROCEDURE — 99213 OFFICE O/P EST LOW 20 MIN: CPT | Performed by: INTERNAL MEDICINE

## 2019-10-02 PROCEDURE — G0008 ADMIN INFLUENZA VIRUS VAC: HCPCS | Performed by: INTERNAL MEDICINE

## 2019-10-02 PROCEDURE — 3017F COLORECTAL CA SCREEN DOC REV: CPT | Performed by: INTERNAL MEDICINE

## 2019-10-02 PROCEDURE — 90662 IIV NO PRSV INCREASED AG IM: CPT | Performed by: INTERNAL MEDICINE

## 2019-10-02 PROCEDURE — G8427 DOCREV CUR MEDS BY ELIG CLIN: HCPCS | Performed by: INTERNAL MEDICINE

## 2019-10-02 PROCEDURE — G8417 CALC BMI ABV UP PARAM F/U: HCPCS | Performed by: INTERNAL MEDICINE

## 2019-10-02 PROCEDURE — 1123F ACP DISCUSS/DSCN MKR DOCD: CPT | Performed by: INTERNAL MEDICINE

## 2019-10-02 PROCEDURE — 4004F PT TOBACCO SCREEN RCVD TLK: CPT | Performed by: INTERNAL MEDICINE

## 2019-10-02 PROCEDURE — G8482 FLU IMMUNIZE ORDER/ADMIN: HCPCS | Performed by: INTERNAL MEDICINE

## 2019-10-02 PROCEDURE — 4040F PNEUMOC VAC/ADMIN/RCVD: CPT | Performed by: INTERNAL MEDICINE

## 2019-10-02 PROCEDURE — G8598 ASA/ANTIPLAT THER USED: HCPCS | Performed by: INTERNAL MEDICINE

## 2019-10-03 ENCOUNTER — OFFICE VISIT (OUTPATIENT)
Dept: CARDIOLOGY CLINIC | Age: 74
End: 2019-10-03
Payer: MEDICARE

## 2019-10-03 VITALS
OXYGEN SATURATION: 96 % | SYSTOLIC BLOOD PRESSURE: 150 MMHG | DIASTOLIC BLOOD PRESSURE: 80 MMHG | HEART RATE: 83 BPM | WEIGHT: 225.12 LBS | BODY MASS INDEX: 31.52 KG/M2 | HEIGHT: 71 IN

## 2019-10-03 DIAGNOSIS — I25.10 CORONARY ARTERY DISEASE INVOLVING NATIVE CORONARY ARTERY OF NATIVE HEART WITHOUT ANGINA PECTORIS: Primary | ICD-10-CM

## 2019-10-03 DIAGNOSIS — I10 ESSENTIAL HYPERTENSION: ICD-10-CM

## 2019-10-03 DIAGNOSIS — E78.2 MIXED HYPERLIPIDEMIA: ICD-10-CM

## 2019-10-03 PROCEDURE — G8482 FLU IMMUNIZE ORDER/ADMIN: HCPCS | Performed by: INTERNAL MEDICINE

## 2019-10-03 PROCEDURE — G8598 ASA/ANTIPLAT THER USED: HCPCS | Performed by: INTERNAL MEDICINE

## 2019-10-03 PROCEDURE — 3017F COLORECTAL CA SCREEN DOC REV: CPT | Performed by: INTERNAL MEDICINE

## 2019-10-03 PROCEDURE — G8417 CALC BMI ABV UP PARAM F/U: HCPCS | Performed by: INTERNAL MEDICINE

## 2019-10-03 PROCEDURE — 4040F PNEUMOC VAC/ADMIN/RCVD: CPT | Performed by: INTERNAL MEDICINE

## 2019-10-03 PROCEDURE — 99214 OFFICE O/P EST MOD 30 MIN: CPT | Performed by: INTERNAL MEDICINE

## 2019-10-03 PROCEDURE — 1123F ACP DISCUSS/DSCN MKR DOCD: CPT | Performed by: INTERNAL MEDICINE

## 2019-10-03 PROCEDURE — 4004F PT TOBACCO SCREEN RCVD TLK: CPT | Performed by: INTERNAL MEDICINE

## 2019-10-03 PROCEDURE — G8427 DOCREV CUR MEDS BY ELIG CLIN: HCPCS | Performed by: INTERNAL MEDICINE

## 2019-10-03 RX ORDER — PRAVASTATIN SODIUM 80 MG/1
80 TABLET ORAL DAILY
Qty: 90 TABLET | Refills: 3 | Status: ON HOLD | OUTPATIENT
Start: 2019-10-03 | End: 2020-05-23 | Stop reason: HOSPADM

## 2019-10-03 RX ORDER — HYDROCHLOROTHIAZIDE 25 MG/1
25 TABLET ORAL EVERY MORNING
Qty: 30 TABLET | Refills: 5 | Status: SHIPPED | OUTPATIENT
Start: 2019-10-03 | End: 2019-10-28

## 2019-10-08 ENCOUNTER — HOSPITAL ENCOUNTER (OUTPATIENT)
Age: 74
Discharge: HOME | End: 2019-10-08
Payer: MEDICARE

## 2019-10-08 ENCOUNTER — OFFICE VISIT (OUTPATIENT)
Dept: INTERNAL MEDICINE CLINIC | Age: 74
End: 2019-10-08

## 2019-10-08 ENCOUNTER — ANESTHESIA EVENT (OUTPATIENT)
Dept: ENDOSCOPY | Age: 74
End: 2019-10-08
Payer: MEDICARE

## 2019-10-08 VITALS
WEIGHT: 224 LBS | BODY MASS INDEX: 31.36 KG/M2 | HEIGHT: 71 IN | DIASTOLIC BLOOD PRESSURE: 75 MMHG | SYSTOLIC BLOOD PRESSURE: 110 MMHG | RESPIRATION RATE: 18 BRPM | HEART RATE: 70 BPM

## 2019-10-08 DIAGNOSIS — I25.10 CORONARY ARTERY DISEASE INVOLVING NATIVE CORONARY ARTERY OF NATIVE HEART WITHOUT ANGINA PECTORIS: ICD-10-CM

## 2019-10-08 DIAGNOSIS — E11.8 TYPE 2 DIABETES MELLITUS WITH COMPLICATION (HCC): Primary | ICD-10-CM

## 2019-10-08 DIAGNOSIS — I10 ESSENTIAL HYPERTENSION: ICD-10-CM

## 2019-10-08 DIAGNOSIS — E78.2 MIXED HYPERLIPIDEMIA: ICD-10-CM

## 2019-10-08 DIAGNOSIS — R91.1 RIGHT UPPER LOBE PULMONARY NODULE: ICD-10-CM

## 2019-10-08 LAB
A/G RATIO: 1.8 (ref 1.1–2.2)
ALBUMIN SERPL-MCNC: 4.8 G/DL (ref 3.4–5)
ALP BLD-CCNC: 92 U/L (ref 40–129)
ALT SERPL-CCNC: 10 U/L (ref 10–40)
ANION GAP SERPL CALCULATED.3IONS-SCNC: 16 MMOL/L (ref 3–16)
AST SERPL-CCNC: 17 U/L (ref 15–37)
BASOPHILS ABSOLUTE: 0.1 K/UL (ref 0–0.2)
BASOPHILS RELATIVE PERCENT: 0.9 %
BILIRUB SERPL-MCNC: 0.8 MG/DL (ref 0–1)
BUN BLDV-MCNC: 10 MG/DL (ref 7–20)
CALCIUM SERPL-MCNC: 9.9 MG/DL (ref 8.3–10.6)
CHLORIDE BLD-SCNC: 90 MMOL/L (ref 99–110)
CHOLESTEROL, FASTING: 143 MG/DL (ref 0–199)
CO2: 25 MMOL/L (ref 21–32)
CREAT SERPL-MCNC: 0.9 MG/DL (ref 0.8–1.3)
EOSINOPHILS ABSOLUTE: 0.2 K/UL (ref 0–0.6)
EOSINOPHILS RELATIVE PERCENT: 2.2 %
GFR AFRICAN AMERICAN: >60
GFR NON-AFRICAN AMERICAN: >60
GLOBULIN: 2.6 G/DL
GLUCOSE FASTING: 144 MG/DL (ref 70–99)
HCT VFR BLD CALC: 39.1 % (ref 40.5–52.5)
HDLC SERPL-MCNC: 37 MG/DL (ref 40–60)
HEMOGLOBIN: 12.7 G/DL (ref 13.5–17.5)
LDL CHOLESTEROL CALCULATED: 60 MG/DL
LYMPHOCYTES ABSOLUTE: 1.7 K/UL (ref 1–5.1)
LYMPHOCYTES RELATIVE PERCENT: 22.7 %
MCH RBC QN AUTO: 25.6 PG (ref 26–34)
MCHC RBC AUTO-ENTMCNC: 32.6 G/DL (ref 31–36)
MCV RBC AUTO: 78.5 FL (ref 80–100)
MONOCYTES ABSOLUTE: 0.6 K/UL (ref 0–1.3)
MONOCYTES RELATIVE PERCENT: 8.5 %
NEUTROPHILS ABSOLUTE: 5 K/UL (ref 1.7–7.7)
NEUTROPHILS RELATIVE PERCENT: 65.7 %
PDW BLD-RTO: 16.3 % (ref 12.4–15.4)
PLATELET # BLD: 302 K/UL (ref 135–450)
PMV BLD AUTO: 7.9 FL (ref 5–10.5)
POTASSIUM SERPL-SCNC: 4.5 MMOL/L (ref 3.5–5.1)
RBC # BLD: 4.98 M/UL (ref 4.2–5.9)
SODIUM BLD-SCNC: 131 MMOL/L (ref 136–145)
TOTAL PROTEIN: 7.4 G/DL (ref 6.4–8.2)
TRIGLYCERIDE, FASTING: 229 MG/DL (ref 0–150)
VLDLC SERPL CALC-MCNC: 46 MG/DL
WBC # BLD: 7.6 K/UL (ref 4–11)

## 2019-10-08 PROCEDURE — 2022F DILAT RTA XM EVC RTNOPTHY: CPT | Performed by: INTERNAL MEDICINE

## 2019-10-08 PROCEDURE — 3045F PR MOST RECENT HEMOGLOBIN A1C LEVEL 7.0-9.0%: CPT | Performed by: INTERNAL MEDICINE

## 2019-10-08 PROCEDURE — 4004F PT TOBACCO SCREEN RCVD TLK: CPT | Performed by: INTERNAL MEDICINE

## 2019-10-08 PROCEDURE — G8598 ASA/ANTIPLAT THER USED: HCPCS | Performed by: INTERNAL MEDICINE

## 2019-10-08 PROCEDURE — 99213 OFFICE O/P EST LOW 20 MIN: CPT | Performed by: INTERNAL MEDICINE

## 2019-10-08 PROCEDURE — G8427 DOCREV CUR MEDS BY ELIG CLIN: HCPCS | Performed by: INTERNAL MEDICINE

## 2019-10-08 PROCEDURE — 80053 COMPREHEN METABOLIC PANEL: CPT

## 2019-10-08 PROCEDURE — G8482 FLU IMMUNIZE ORDER/ADMIN: HCPCS | Performed by: INTERNAL MEDICINE

## 2019-10-08 PROCEDURE — G8417 CALC BMI ABV UP PARAM F/U: HCPCS | Performed by: INTERNAL MEDICINE

## 2019-10-08 PROCEDURE — 1123F ACP DISCUSS/DSCN MKR DOCD: CPT | Performed by: INTERNAL MEDICINE

## 2019-10-08 PROCEDURE — 4040F PNEUMOC VAC/ADMIN/RCVD: CPT | Performed by: INTERNAL MEDICINE

## 2019-10-08 PROCEDURE — 36415 COLL VENOUS BLD VENIPUNCTURE: CPT

## 2019-10-08 PROCEDURE — 3017F COLORECTAL CA SCREEN DOC REV: CPT | Performed by: INTERNAL MEDICINE

## 2019-10-08 PROCEDURE — 85025 COMPLETE CBC W/AUTO DIFF WBC: CPT

## 2019-10-08 PROCEDURE — 80061 LIPID PANEL: CPT

## 2019-10-08 RX ORDER — ALBUTEROL SULFATE 90 UG/1
2 AEROSOL, METERED RESPIRATORY (INHALATION) EVERY 6 HOURS PRN
Qty: 1 INHALER | Refills: 2 | Status: SHIPPED | OUTPATIENT
Start: 2019-10-08

## 2019-10-08 RX ORDER — NICOTINE 21 MG/24HR
1 PATCH, TRANSDERMAL 24 HOURS TRANSDERMAL DAILY
Qty: 42 PATCH | Refills: 0 | Status: SHIPPED | OUTPATIENT
Start: 2019-10-08 | End: 2020-01-06

## 2019-10-09 ENCOUNTER — HOSPITAL ENCOUNTER (OUTPATIENT)
Age: 74
Setting detail: OUTPATIENT SURGERY
Discharge: HOME | End: 2019-10-09
Attending: INTERNAL MEDICINE | Admitting: INTERNAL MEDICINE
Payer: MEDICARE

## 2019-10-09 ENCOUNTER — ANESTHESIA (OUTPATIENT)
Dept: ENDOSCOPY | Age: 74
End: 2019-10-09
Payer: MEDICARE

## 2019-10-09 VITALS
OXYGEN SATURATION: 93 % | TEMPERATURE: 97.7 F | RESPIRATION RATE: 16 BRPM | HEIGHT: 71 IN | DIASTOLIC BLOOD PRESSURE: 60 MMHG | HEART RATE: 76 BPM | BODY MASS INDEX: 31.5 KG/M2 | WEIGHT: 225 LBS | SYSTOLIC BLOOD PRESSURE: 125 MMHG

## 2019-10-09 VITALS
RESPIRATION RATE: 9 BRPM | OXYGEN SATURATION: 99 % | SYSTOLIC BLOOD PRESSURE: 138 MMHG | DIASTOLIC BLOOD PRESSURE: 57 MMHG

## 2019-10-09 LAB
GLUCOSE BLD-MCNC: 149 MG/DL (ref 70–99)
PERFORMED ON: ABNORMAL

## 2019-10-09 PROCEDURE — 7100000011 HC PHASE II RECOVERY - ADDTL 15 MIN: Performed by: INTERNAL MEDICINE

## 2019-10-09 PROCEDURE — 87206 SMEAR FLUORESCENT/ACID STAI: CPT

## 2019-10-09 PROCEDURE — 87116 MYCOBACTERIA CULTURE: CPT

## 2019-10-09 PROCEDURE — 7100000010 HC PHASE II RECOVERY - FIRST 15 MIN: Performed by: INTERNAL MEDICINE

## 2019-10-09 PROCEDURE — 2580000003 HC RX 258: Performed by: NURSE ANESTHETIST, CERTIFIED REGISTERED

## 2019-10-09 PROCEDURE — 3603165200 HC BRNCHSC EBUS GUIDED SAMPL 1/2 NODE STATION/STRUX: Performed by: INTERNAL MEDICINE

## 2019-10-09 PROCEDURE — 87205 SMEAR GRAM STAIN: CPT

## 2019-10-09 PROCEDURE — 3609010800 HC BRONCHOSCOPY ALVEOLAR LAVAGE: Performed by: INTERNAL MEDICINE

## 2019-10-09 PROCEDURE — 88177 CYTP FNA EVAL EA ADDL: CPT

## 2019-10-09 PROCEDURE — 31624 DX BRONCHOSCOPE/LAVAGE: CPT | Performed by: INTERNAL MEDICINE

## 2019-10-09 PROCEDURE — 3700000001 HC ADD 15 MINUTES (ANESTHESIA): Performed by: INTERNAL MEDICINE

## 2019-10-09 PROCEDURE — 87102 FUNGUS ISOLATION CULTURE: CPT

## 2019-10-09 PROCEDURE — 87070 CULTURE OTHR SPECIMN AEROBIC: CPT

## 2019-10-09 PROCEDURE — 31652 BRONCH EBUS SAMPLNG 1/2 NODE: CPT | Performed by: INTERNAL MEDICINE

## 2019-10-09 PROCEDURE — 3700000000 HC ANESTHESIA ATTENDED CARE: Performed by: INTERNAL MEDICINE

## 2019-10-09 PROCEDURE — 3609011900 HC BRONCHOSCOPY NEEDLE BX TRACHEA MAIN STEM&/BRON: Performed by: INTERNAL MEDICINE

## 2019-10-09 PROCEDURE — 88305 TISSUE EXAM BY PATHOLOGIST: CPT

## 2019-10-09 PROCEDURE — 88173 CYTOPATH EVAL FNA REPORT: CPT

## 2019-10-09 PROCEDURE — 2500000003 HC RX 250 WO HCPCS: Performed by: NURSE ANESTHETIST, CERTIFIED REGISTERED

## 2019-10-09 PROCEDURE — 2709999900 HC NON-CHARGEABLE SUPPLY: Performed by: INTERNAL MEDICINE

## 2019-10-09 PROCEDURE — 87015 SPECIMEN INFECT AGNT CONCNTJ: CPT

## 2019-10-09 PROCEDURE — 3609011700 HC BRONCHOSCOPY/TRANSBRONCHIAL LUNG BIOPSY ADDL LOBE: Performed by: INTERNAL MEDICINE

## 2019-10-09 PROCEDURE — 2720000010 HC SURG SUPPLY STERILE: Performed by: INTERNAL MEDICINE

## 2019-10-09 PROCEDURE — 88112 CYTOPATH CELL ENHANCE TECH: CPT

## 2019-10-09 PROCEDURE — 88172 CYTP DX EVAL FNA 1ST EA SITE: CPT

## 2019-10-09 PROCEDURE — 6360000002 HC RX W HCPCS: Performed by: NURSE ANESTHETIST, CERTIFIED REGISTERED

## 2019-10-09 RX ORDER — LIDOCAINE HYDROCHLORIDE 20 MG/ML
INJECTION, SOLUTION INFILTRATION; PERINEURAL PRN
Status: DISCONTINUED | OUTPATIENT
Start: 2019-10-09 | End: 2019-10-09 | Stop reason: SDUPTHER

## 2019-10-09 RX ORDER — ROCURONIUM BROMIDE 10 MG/ML
INJECTION, SOLUTION INTRAVENOUS PRN
Status: DISCONTINUED | OUTPATIENT
Start: 2019-10-09 | End: 2019-10-09 | Stop reason: SDUPTHER

## 2019-10-09 RX ORDER — ONDANSETRON 2 MG/ML
INJECTION INTRAMUSCULAR; INTRAVENOUS PRN
Status: DISCONTINUED | OUTPATIENT
Start: 2019-10-09 | End: 2019-10-09 | Stop reason: SDUPTHER

## 2019-10-09 RX ORDER — PROPOFOL 10 MG/ML
INJECTION, EMULSION INTRAVENOUS PRN
Status: DISCONTINUED | OUTPATIENT
Start: 2019-10-09 | End: 2019-10-09 | Stop reason: SDUPTHER

## 2019-10-09 RX ORDER — SODIUM CHLORIDE, SODIUM LACTATE, POTASSIUM CHLORIDE, CALCIUM CHLORIDE 600; 310; 30; 20 MG/100ML; MG/100ML; MG/100ML; MG/100ML
INJECTION, SOLUTION INTRAVENOUS CONTINUOUS PRN
Status: DISCONTINUED | OUTPATIENT
Start: 2019-10-09 | End: 2019-10-09 | Stop reason: SDUPTHER

## 2019-10-09 RX ORDER — EPHEDRINE SULFATE 50 MG/ML
INJECTION INTRAVENOUS PRN
Status: DISCONTINUED | OUTPATIENT
Start: 2019-10-09 | End: 2019-10-09 | Stop reason: SDUPTHER

## 2019-10-09 RX ORDER — FENTANYL CITRATE 50 UG/ML
INJECTION, SOLUTION INTRAMUSCULAR; INTRAVENOUS PRN
Status: DISCONTINUED | OUTPATIENT
Start: 2019-10-09 | End: 2019-10-09 | Stop reason: SDUPTHER

## 2019-10-09 RX ORDER — DEXAMETHASONE SODIUM PHOSPHATE 4 MG/ML
INJECTION, SOLUTION INTRA-ARTICULAR; INTRALESIONAL; INTRAMUSCULAR; INTRAVENOUS; SOFT TISSUE PRN
Status: DISCONTINUED | OUTPATIENT
Start: 2019-10-09 | End: 2019-10-09 | Stop reason: SDUPTHER

## 2019-10-09 RX ADMIN — SODIUM CHLORIDE, POTASSIUM CHLORIDE, SODIUM LACTATE AND CALCIUM CHLORIDE: 600; 310; 30; 20 INJECTION, SOLUTION INTRAVENOUS at 11:45

## 2019-10-09 RX ADMIN — PROPOFOL 200 MG: 10 INJECTION, EMULSION INTRAVENOUS at 11:56

## 2019-10-09 RX ADMIN — SUGAMMADEX 200 MG: 100 INJECTION, SOLUTION INTRAVENOUS at 12:33

## 2019-10-09 RX ADMIN — EPHEDRINE SULFATE 5 MG: 50 INJECTION INTRAVENOUS at 12:19

## 2019-10-09 RX ADMIN — PHENYLEPHRINE HYDROCHLORIDE 100 MCG: 10 INJECTION INTRAVENOUS at 12:04

## 2019-10-09 RX ADMIN — EPHEDRINE SULFATE 5 MG: 50 INJECTION INTRAVENOUS at 12:25

## 2019-10-09 RX ADMIN — DEXAMETHASONE SODIUM PHOSPHATE 8 MG: 4 INJECTION, SOLUTION INTRAMUSCULAR; INTRAVENOUS at 12:05

## 2019-10-09 RX ADMIN — FENTANYL CITRATE 50 MCG: 50 INJECTION INTRAMUSCULAR; INTRAVENOUS at 11:56

## 2019-10-09 RX ADMIN — ROCURONIUM BROMIDE 50 MG: 10 INJECTION, SOLUTION INTRAVENOUS at 11:56

## 2019-10-09 RX ADMIN — EPHEDRINE SULFATE 5 MG: 50 INJECTION INTRAVENOUS at 12:10

## 2019-10-09 RX ADMIN — LIDOCAINE HYDROCHLORIDE 60 MG: 20 INJECTION, SOLUTION INFILTRATION; PERINEURAL at 11:56

## 2019-10-09 RX ADMIN — PHENYLEPHRINE HYDROCHLORIDE 100 MCG: 10 INJECTION INTRAVENOUS at 12:06

## 2019-10-09 RX ADMIN — PHENYLEPHRINE HYDROCHLORIDE 100 MCG: 10 INJECTION INTRAVENOUS at 12:23

## 2019-10-09 RX ADMIN — EPHEDRINE SULFATE 5 MG: 50 INJECTION INTRAVENOUS at 12:15

## 2019-10-09 RX ADMIN — PHENYLEPHRINE HYDROCHLORIDE 100 MCG: 10 INJECTION INTRAVENOUS at 12:08

## 2019-10-09 RX ADMIN — ONDANSETRON 4 MG: 2 INJECTION, SOLUTION INTRAMUSCULAR; INTRAVENOUS at 12:05

## 2019-10-09 ASSESSMENT — PAIN - FUNCTIONAL ASSESSMENT: PAIN_FUNCTIONAL_ASSESSMENT: 0-10

## 2019-10-09 ASSESSMENT — PULMONARY FUNCTION TESTS
PIF_VALUE: 19
PIF_VALUE: 24
PIF_VALUE: 0
PIF_VALUE: 19
PIF_VALUE: 14
PIF_VALUE: 24
PIF_VALUE: 22
PIF_VALUE: 0
PIF_VALUE: 24
PIF_VALUE: 4
PIF_VALUE: 22
PIF_VALUE: 12
PIF_VALUE: 14
PIF_VALUE: 22
PIF_VALUE: 19
PIF_VALUE: 12
PIF_VALUE: 19
PIF_VALUE: 22
PIF_VALUE: 1
PIF_VALUE: 24
PIF_VALUE: 4
PIF_VALUE: 0
PIF_VALUE: 14
PIF_VALUE: 24
PIF_VALUE: 22
PIF_VALUE: 21
PIF_VALUE: 0
PIF_VALUE: 22
PIF_VALUE: 15
PIF_VALUE: 2
PIF_VALUE: 24
PIF_VALUE: 19
PIF_VALUE: 6
PIF_VALUE: 22
PIF_VALUE: 22
PIF_VALUE: 0
PIF_VALUE: 24
PIF_VALUE: 0
PIF_VALUE: 22
PIF_VALUE: 0
PIF_VALUE: 18
PIF_VALUE: 19
PIF_VALUE: 0
PIF_VALUE: 12
PIF_VALUE: 0
PIF_VALUE: 19
PIF_VALUE: 19
PIF_VALUE: 11
PIF_VALUE: 1
PIF_VALUE: 24
PIF_VALUE: 24
PIF_VALUE: 1
PIF_VALUE: 0
PIF_VALUE: 12
PIF_VALUE: 19

## 2019-10-09 ASSESSMENT — PAIN SCALES - GENERAL: PAINLEVEL_OUTOF10: 0

## 2019-10-09 ASSESSMENT — COPD QUESTIONNAIRES: CAT_SEVERITY: SEVERE

## 2019-10-09 ASSESSMENT — PAIN DESCRIPTION - DESCRIPTORS: DESCRIPTORS: ACHING

## 2019-10-11 LAB
CULTURE, RESPIRATORY: NORMAL
CULTURE, RESPIRATORY: NORMAL
GRAM STAIN RESULT: NORMAL
GRAM STAIN RESULT: NORMAL

## 2019-10-15 RX ORDER — OMEPRAZOLE 20 MG/1
20 CAPSULE, DELAYED RELEASE ORAL DAILY
Qty: 90 CAPSULE | Refills: 0 | Status: SHIPPED | OUTPATIENT
Start: 2019-10-15 | End: 2019-12-26

## 2019-10-15 RX ORDER — CITALOPRAM 20 MG/1
TABLET ORAL
Qty: 90 TABLET | Refills: 0 | Status: SHIPPED | OUTPATIENT
Start: 2019-10-15 | End: 2020-01-15

## 2019-10-18 DIAGNOSIS — M54.50 CHRONIC MIDLINE LOW BACK PAIN WITHOUT SCIATICA: ICD-10-CM

## 2019-10-18 DIAGNOSIS — G89.29 CHRONIC MIDLINE LOW BACK PAIN WITHOUT SCIATICA: ICD-10-CM

## 2019-10-21 RX ORDER — HYDROCODONE BITARTRATE AND ACETAMINOPHEN 5; 325 MG/1; MG/1
1 TABLET ORAL EVERY 8 HOURS PRN
Qty: 90 TABLET | Refills: 0 | Status: SHIPPED | OUTPATIENT
Start: 2019-10-21 | End: 2019-11-29 | Stop reason: SDUPTHER

## 2019-10-28 ENCOUNTER — OFFICE VISIT (OUTPATIENT)
Dept: CARDIOTHORACIC SURGERY | Age: 74
End: 2019-10-28
Payer: MEDICARE

## 2019-10-28 VITALS
WEIGHT: 226 LBS | OXYGEN SATURATION: 93 % | HEART RATE: 79 BPM | BODY MASS INDEX: 31.52 KG/M2 | SYSTOLIC BLOOD PRESSURE: 130 MMHG | DIASTOLIC BLOOD PRESSURE: 70 MMHG

## 2019-10-28 DIAGNOSIS — R91.1 PULMONARY NODULE: Primary | ICD-10-CM

## 2019-10-28 DIAGNOSIS — I25.10 CORONARY ARTERY DISEASE INVOLVING NATIVE CORONARY ARTERY OF NATIVE HEART WITHOUT ANGINA PECTORIS: ICD-10-CM

## 2019-10-28 PROCEDURE — G8598 ASA/ANTIPLAT THER USED: HCPCS | Performed by: THORACIC SURGERY (CARDIOTHORACIC VASCULAR SURGERY)

## 2019-10-28 PROCEDURE — 1123F ACP DISCUSS/DSCN MKR DOCD: CPT | Performed by: THORACIC SURGERY (CARDIOTHORACIC VASCULAR SURGERY)

## 2019-10-28 PROCEDURE — G8482 FLU IMMUNIZE ORDER/ADMIN: HCPCS | Performed by: THORACIC SURGERY (CARDIOTHORACIC VASCULAR SURGERY)

## 2019-10-28 PROCEDURE — 3017F COLORECTAL CA SCREEN DOC REV: CPT | Performed by: THORACIC SURGERY (CARDIOTHORACIC VASCULAR SURGERY)

## 2019-10-28 PROCEDURE — G8427 DOCREV CUR MEDS BY ELIG CLIN: HCPCS | Performed by: THORACIC SURGERY (CARDIOTHORACIC VASCULAR SURGERY)

## 2019-10-28 PROCEDURE — 99203 OFFICE O/P NEW LOW 30 MIN: CPT | Performed by: THORACIC SURGERY (CARDIOTHORACIC VASCULAR SURGERY)

## 2019-10-28 PROCEDURE — 4040F PNEUMOC VAC/ADMIN/RCVD: CPT | Performed by: THORACIC SURGERY (CARDIOTHORACIC VASCULAR SURGERY)

## 2019-10-28 PROCEDURE — G8417 CALC BMI ABV UP PARAM F/U: HCPCS | Performed by: THORACIC SURGERY (CARDIOTHORACIC VASCULAR SURGERY)

## 2019-10-28 PROCEDURE — 4004F PT TOBACCO SCREEN RCVD TLK: CPT | Performed by: THORACIC SURGERY (CARDIOTHORACIC VASCULAR SURGERY)

## 2019-10-28 RX ORDER — ZOLPIDEM TARTRATE 10 MG/1
TABLET ORAL NIGHTLY PRN
COMMUNITY
End: 2019-12-03 | Stop reason: ALTCHOICE

## 2019-10-30 RX ORDER — AMIODARONE HYDROCHLORIDE 200 MG/1
400 TABLET ORAL 2 TIMES DAILY
Qty: 8 TABLET | Refills: 0 | Status: SHIPPED | OUTPATIENT
Start: 2019-10-30 | End: 2020-04-08

## 2019-11-07 ENCOUNTER — TELEPHONE (OUTPATIENT)
Dept: CARDIOTHORACIC SURGERY | Age: 74
End: 2019-11-07

## 2019-11-11 LAB
FUNGUS (MYCOLOGY) CULTURE: NORMAL
FUNGUS (MYCOLOGY) CULTURE: NORMAL
FUNGUS STAIN: NORMAL
FUNGUS STAIN: NORMAL

## 2019-11-12 ENCOUNTER — HOSPITAL ENCOUNTER (OUTPATIENT)
Dept: PREADMISSION TESTING | Age: 74
Discharge: HOME | End: 2019-11-12
Payer: MEDICARE

## 2019-11-14 ENCOUNTER — HOSPITAL ENCOUNTER (OUTPATIENT)
Dept: PREADMISSION TESTING | Age: 74
Discharge: HOME | End: 2019-11-14
Payer: MEDICARE

## 2019-11-20 ENCOUNTER — TELEPHONE (OUTPATIENT)
Dept: PULMONOLOGY | Age: 74
End: 2019-11-20

## 2019-11-26 LAB
AFB CULTURE (MYCOBACTERIA): NORMAL
AFB CULTURE (MYCOBACTERIA): NORMAL
AFB SMEAR: NORMAL
AFB SMEAR: NORMAL

## 2019-11-29 DIAGNOSIS — M54.50 CHRONIC MIDLINE LOW BACK PAIN WITHOUT SCIATICA: ICD-10-CM

## 2019-11-29 DIAGNOSIS — G89.29 CHRONIC MIDLINE LOW BACK PAIN WITHOUT SCIATICA: ICD-10-CM

## 2019-11-29 DIAGNOSIS — F51.01 PRIMARY INSOMNIA: ICD-10-CM

## 2019-12-03 RX ORDER — ZOLPIDEM TARTRATE 10 MG/1
TABLET ORAL
Qty: 90 TABLET | Refills: 0 | Status: SHIPPED | OUTPATIENT
Start: 2019-12-03 | End: 2020-02-27

## 2019-12-03 RX ORDER — HYDROCODONE BITARTRATE AND ACETAMINOPHEN 5; 325 MG/1; MG/1
1 TABLET ORAL EVERY 8 HOURS PRN
Qty: 90 TABLET | Refills: 0 | Status: SHIPPED | OUTPATIENT
Start: 2019-12-03 | End: 2020-01-06

## 2019-12-09 RX ORDER — GLIMEPIRIDE 4 MG/1
TABLET ORAL
Qty: 90 TABLET | Refills: 0 | Status: SHIPPED | OUTPATIENT
Start: 2019-12-09 | End: 2020-03-02

## 2019-12-13 ENCOUNTER — TELEPHONE (OUTPATIENT)
Dept: CARDIOTHORACIC SURGERY | Age: 74
End: 2019-12-13

## 2019-12-16 ENCOUNTER — TELEPHONE (OUTPATIENT)
Dept: PULMONOLOGY | Age: 74
End: 2019-12-16

## 2019-12-26 RX ORDER — OMEPRAZOLE 20 MG/1
20 CAPSULE, DELAYED RELEASE ORAL DAILY
Qty: 90 CAPSULE | Refills: 0 | Status: SHIPPED | OUTPATIENT
Start: 2019-12-26 | End: 2020-03-02

## 2020-01-03 ENCOUNTER — TELEPHONE (OUTPATIENT)
Dept: INTERNAL MEDICINE CLINIC | Age: 75
End: 2020-01-03

## 2020-01-03 RX ORDER — METHYLPREDNISOLONE 4 MG/1
TABLET ORAL
Qty: 1 KIT | Refills: 0 | Status: SHIPPED | OUTPATIENT
Start: 2020-01-03 | End: 2020-01-09

## 2020-01-03 RX ORDER — AZITHROMYCIN 250 MG/1
TABLET, FILM COATED ORAL
Qty: 1 PACKET | Refills: 0 | Status: SHIPPED | OUTPATIENT
Start: 2020-01-03 | End: 2020-04-08

## 2020-01-06 RX ORDER — HYDROCODONE BITARTRATE AND ACETAMINOPHEN 5; 325 MG/1; MG/1
1 TABLET ORAL EVERY 8 HOURS PRN
Qty: 90 TABLET | Refills: 0 | Status: SHIPPED | OUTPATIENT
Start: 2020-01-06 | End: 2020-02-05

## 2020-01-06 RX ORDER — NICOTINE 21 MG/24HR
1 PATCH, TRANSDERMAL 24 HOURS TRANSDERMAL DAILY
Qty: 14 PATCH | Refills: 0 | Status: SHIPPED | OUTPATIENT
Start: 2020-01-06 | End: 2020-03-02

## 2020-01-08 ENCOUNTER — TELEPHONE (OUTPATIENT)
Dept: CARDIOTHORACIC SURGERY | Age: 75
End: 2020-01-08

## 2020-01-09 ENCOUNTER — TELEPHONE (OUTPATIENT)
Dept: PULMONOLOGY | Age: 75
End: 2020-01-09

## 2020-01-15 RX ORDER — CITALOPRAM 20 MG/1
TABLET ORAL
Qty: 90 TABLET | Refills: 0 | Status: SHIPPED | OUTPATIENT
Start: 2020-01-15 | End: 2020-03-02

## 2020-02-05 RX ORDER — HYDROCODONE BITARTRATE AND ACETAMINOPHEN 5; 325 MG/1; MG/1
1 TABLET ORAL EVERY 8 HOURS PRN
Qty: 90 TABLET | Refills: 0 | Status: SHIPPED | OUTPATIENT
Start: 2020-02-05 | End: 2020-03-09

## 2020-02-27 RX ORDER — ZOLPIDEM TARTRATE 10 MG/1
TABLET ORAL
Qty: 90 TABLET | Refills: 0 | Status: SHIPPED | OUTPATIENT
Start: 2020-03-03 | End: 2020-02-28

## 2020-02-27 NOTE — TELEPHONE ENCOUNTER
----- Message from Shannan Abad sent at 2/27/2020 10:23 AM EST -----  Contact: Bharat Gillis- [phone]  Maddy-wife of Pt called to get a refill on his zolpidem (AMBIEN) 10 MG tablet. His refill was denied because he didn't have a future appt.  He has scheduled one.    zolpidem (AMBIEN) 10 MG tablet    Michael Nicholson Elis- [phone]    Past appt- 10/8/2019  Future appt- 3/19/2020        SE

## 2020-02-28 RX ORDER — ZOLPIDEM TARTRATE 10 MG/1
TABLET ORAL
Qty: 90 TABLET | Refills: 0 | Status: ON HOLD | OUTPATIENT
Start: 2020-03-02 | End: 2020-05-23 | Stop reason: HOSPADM

## 2020-03-02 RX ORDER — GLIMEPIRIDE 4 MG/1
TABLET ORAL
Qty: 90 TABLET | Refills: 0 | Status: ON HOLD | OUTPATIENT
Start: 2020-03-02 | End: 2020-05-06 | Stop reason: HOSPADM

## 2020-03-02 RX ORDER — NICOTINE 21 MG/24HR
1 PATCH, TRANSDERMAL 24 HOURS TRANSDERMAL DAILY
Qty: 14 PATCH | Refills: 0 | Status: SHIPPED | OUTPATIENT
Start: 2020-03-02 | End: 2020-04-08

## 2020-03-02 RX ORDER — CITALOPRAM 20 MG/1
TABLET ORAL
Qty: 90 TABLET | Refills: 0 | Status: SHIPPED | OUTPATIENT
Start: 2020-03-02 | End: 2020-04-08

## 2020-03-02 RX ORDER — OMEPRAZOLE 20 MG/1
20 CAPSULE, DELAYED RELEASE ORAL DAILY
Qty: 90 CAPSULE | Refills: 0 | Status: SHIPPED | OUTPATIENT
Start: 2020-03-02 | End: 2020-05-07 | Stop reason: ALTCHOICE

## 2020-03-09 RX ORDER — HYDROCHLOROTHIAZIDE 25 MG/1
25 TABLET ORAL EVERY MORNING
Qty: 30 TABLET | Refills: 3 | Status: SHIPPED | OUTPATIENT
Start: 2020-03-09 | End: 2020-05-01 | Stop reason: ALTCHOICE

## 2020-03-09 RX ORDER — HYDROCODONE BITARTRATE AND ACETAMINOPHEN 5; 325 MG/1; MG/1
1 TABLET ORAL EVERY 8 HOURS PRN
Qty: 90 TABLET | Refills: 0 | Status: SHIPPED | OUTPATIENT
Start: 2020-03-09 | End: 2020-04-07

## 2020-04-07 RX ORDER — HYDROCODONE BITARTRATE AND ACETAMINOPHEN 5; 325 MG/1; MG/1
TABLET ORAL
Qty: 90 TABLET | Refills: 0 | Status: SHIPPED | OUTPATIENT
Start: 2020-04-08 | End: 2020-05-07 | Stop reason: ALTCHOICE

## 2020-04-07 RX ORDER — ZOLPIDEM TARTRATE 10 MG/1
TABLET ORAL
Qty: 30 TABLET | Refills: 0 | OUTPATIENT
Start: 2020-04-07 | End: 2020-05-07

## 2020-04-08 ENCOUNTER — TELEPHONE (OUTPATIENT)
Dept: PULMONOLOGY | Age: 75
End: 2020-04-08

## 2020-04-08 ENCOUNTER — APPOINTMENT (OUTPATIENT)
Dept: GENERAL RADIOLOGY | Age: 75
End: 2020-04-08
Payer: MEDICARE

## 2020-04-08 ENCOUNTER — HOSPITAL ENCOUNTER (EMERGENCY)
Age: 75
Discharge: HOME | End: 2020-04-08
Attending: EMERGENCY MEDICINE
Payer: MEDICARE

## 2020-04-08 ENCOUNTER — APPOINTMENT (OUTPATIENT)
Dept: CT IMAGING | Age: 75
End: 2020-04-08
Payer: MEDICARE

## 2020-04-08 VITALS
DIASTOLIC BLOOD PRESSURE: 75 MMHG | OXYGEN SATURATION: 92 % | HEIGHT: 71 IN | RESPIRATION RATE: 22 BRPM | BODY MASS INDEX: 28 KG/M2 | TEMPERATURE: 98.6 F | SYSTOLIC BLOOD PRESSURE: 145 MMHG | WEIGHT: 200 LBS | HEART RATE: 88 BPM

## 2020-04-08 LAB
A/G RATIO: 1.4 (ref 1.1–2.2)
ALBUMIN SERPL-MCNC: 4.1 G/DL (ref 3.4–5)
ALP BLD-CCNC: 103 U/L (ref 40–129)
ALT SERPL-CCNC: 7 U/L (ref 10–40)
ANION GAP SERPL CALCULATED.3IONS-SCNC: 10 MMOL/L (ref 3–16)
AST SERPL-CCNC: 18 U/L (ref 15–37)
BASE EXCESS VENOUS: -0.8 MMOL/L (ref -3–3)
BASOPHILS ABSOLUTE: 0 K/UL (ref 0–0.2)
BASOPHILS RELATIVE PERCENT: 0.6 %
BILIRUB SERPL-MCNC: 0.5 MG/DL (ref 0–1)
BUN BLDV-MCNC: 11 MG/DL (ref 7–20)
CALCIUM SERPL-MCNC: 9.9 MG/DL (ref 8.3–10.6)
CARBOXYHEMOGLOBIN: 5.9 % (ref 0–1.5)
CHLORIDE BLD-SCNC: 93 MMOL/L (ref 99–110)
CO2: 25 MMOL/L (ref 21–32)
CREAT SERPL-MCNC: 0.9 MG/DL (ref 0.8–1.3)
EKG ATRIAL RATE: 106 BPM
EKG DIAGNOSIS: NORMAL
EKG P AXIS: 30 DEGREES
EKG P-R INTERVAL: 206 MS
EKG Q-T INTERVAL: 354 MS
EKG QRS DURATION: 124 MS
EKG QTC CALCULATION (BAZETT): 470 MS
EKG R AXIS: -67 DEGREES
EKG T AXIS: 99 DEGREES
EKG VENTRICULAR RATE: 106 BPM
EOSINOPHILS ABSOLUTE: 0.3 K/UL (ref 0–0.6)
EOSINOPHILS RELATIVE PERCENT: 3.7 %
GFR AFRICAN AMERICAN: >60
GFR NON-AFRICAN AMERICAN: >60
GLOBULIN: 3 G/DL
GLUCOSE BLD-MCNC: 251 MG/DL (ref 70–99)
HCO3 VENOUS: 22.9 MMOL/L (ref 23–29)
HCT VFR BLD CALC: 40.8 % (ref 40.5–52.5)
HEMOGLOBIN: 13.4 G/DL (ref 13.5–17.5)
LACTIC ACID, SEPSIS: 2 MMOL/L (ref 0.4–1.9)
LACTIC ACID: 1.2 MMOL/L (ref 0.4–2)
LYMPHOCYTES ABSOLUTE: 1.2 K/UL (ref 1–5.1)
LYMPHOCYTES RELATIVE PERCENT: 15.6 %
MCH RBC QN AUTO: 27.6 PG (ref 26–34)
MCHC RBC AUTO-ENTMCNC: 32.7 G/DL (ref 31–36)
MCV RBC AUTO: 84.3 FL (ref 80–100)
METHEMOGLOBIN VENOUS: 0.3 %
MONOCYTES ABSOLUTE: 0.6 K/UL (ref 0–1.3)
MONOCYTES RELATIVE PERCENT: 8 %
NEUTROPHILS ABSOLUTE: 5.7 K/UL (ref 1.7–7.7)
NEUTROPHILS RELATIVE PERCENT: 72.1 %
O2 CONTENT, VEN: 15 VOL %
O2 SAT, VEN: 80 %
O2 THERAPY: ABNORMAL
PCO2, VEN: 35.4 MMHG (ref 40–50)
PDW BLD-RTO: 15 % (ref 12.4–15.4)
PH VENOUS: 7.43 (ref 7.35–7.45)
PLATELET # BLD: 433 K/UL (ref 135–450)
PMV BLD AUTO: 6.7 FL (ref 5–10.5)
PO2, VEN: 42.1 MMHG (ref 25–40)
POTASSIUM SERPL-SCNC: 4.2 MMOL/L (ref 3.5–5.1)
PRO-BNP: 1165 PG/ML (ref 0–124)
RBC # BLD: 4.84 M/UL (ref 4.2–5.9)
SODIUM BLD-SCNC: 128 MMOL/L (ref 136–145)
TCO2 CALC VENOUS: 24 MMOL/L
TOTAL PROTEIN: 7.1 G/DL (ref 6.4–8.2)
TROPONIN: 0.01 NG/ML
TROPONIN: 0.02 NG/ML
WBC # BLD: 7.9 K/UL (ref 4–11)

## 2020-04-08 PROCEDURE — 83605 ASSAY OF LACTIC ACID: CPT

## 2020-04-08 PROCEDURE — 36415 COLL VENOUS BLD VENIPUNCTURE: CPT

## 2020-04-08 PROCEDURE — 82803 BLOOD GASES ANY COMBINATION: CPT

## 2020-04-08 PROCEDURE — 93005 ELECTROCARDIOGRAM TRACING: CPT

## 2020-04-08 PROCEDURE — 83880 ASSAY OF NATRIURETIC PEPTIDE: CPT

## 2020-04-08 PROCEDURE — 80053 COMPREHEN METABOLIC PANEL: CPT

## 2020-04-08 PROCEDURE — 6370000000 HC RX 637 (ALT 250 FOR IP): Performed by: EMERGENCY MEDICINE

## 2020-04-08 PROCEDURE — 85025 COMPLETE CBC W/AUTO DIFF WBC: CPT

## 2020-04-08 PROCEDURE — 99285 EMERGENCY DEPT VISIT HI MDM: CPT

## 2020-04-08 PROCEDURE — 84484 ASSAY OF TROPONIN QUANT: CPT

## 2020-04-08 PROCEDURE — 6360000004 HC RX CONTRAST MEDICATION: Performed by: EMERGENCY MEDICINE

## 2020-04-08 PROCEDURE — 71045 X-RAY EXAM CHEST 1 VIEW: CPT

## 2020-04-08 PROCEDURE — 87040 BLOOD CULTURE FOR BACTERIA: CPT

## 2020-04-08 PROCEDURE — 71260 CT THORAX DX C+: CPT

## 2020-04-08 RX ORDER — ASPIRIN 81 MG/1
324 TABLET, CHEWABLE ORAL ONCE
Status: COMPLETED | OUTPATIENT
Start: 2020-04-08 | End: 2020-04-08

## 2020-04-08 RX ADMIN — IOPAMIDOL 75 ML: 755 INJECTION, SOLUTION INTRAVENOUS at 01:41

## 2020-04-08 RX ADMIN — ASPIRIN 81 MG 324 MG: 81 TABLET ORAL at 01:10

## 2020-04-08 NOTE — ED PROVIDER NOTES
10/9/2019    BRONCHOSCOPY/TRANSBRONCHIAL LUNG BIOPSY ADDL LOBE performed by Jeri Cullen MD at [facility] 67      stents x4    CIRCUMCISION      DENTAL SURGERY      EPIDURAL STEROID INJECTION Bilateral 12/17/2018    BILATERAL LUMBAR THREE FOUR EPIDURAL STEROID INJECTION SITE CONFIRMED BY FLUOROSCOPY performed by Trice Lloyd MD at [facility] DX/THER SBST INTRLMNR CRV/THRC W/IMG GDN Right 7/31/2018    RIGHT LUMBAR FIVE SIX EPIDURAL STEROID INJECTION SITE CONFIRMED BY FLUOROSCOPY performed by Trice Lloyd MD at [facility] DX/THER SBST INTRLMNR CRV/THRC W/IMG GDN Bilateral 9/18/2018    RIGHT LUMBAR THREE, LUBMAR FOUR, LUMBAR FIVE MEDIAL BRANCH BLOCK SITE CONFIRMED BY FLUOROSCOPY performed by Trice Lloyd MD at [facility] DX/THER SBST INTRLMNR CRV/THRC W/IMG GDN Bilateral 10/8/2018    BILATERAL LUMBAR THREE, LUMBAR FOUR, LUMBAR FIVE RADIOFREQUENCY ABLATION SITE CONFIRMED BY FLUOROSCOPY performed by Trice Lloyd MD at [address] [place] [address] N/A 8/27/2019    MIDLINE LUMBAR SPINAL CORD STIMULATOR TRIAL WITH [employer] performed by Juan A Reece MD at [place] [address]     Family History   Problem Relation Age of Onset    Heart Disease Mother     Diabetes Father      Social History     Socioeconomic History    Marital status:      Spouse name: Not on file    Number of children: Not on file    Years of education: Not on file    Highest education level: Not on file   Occupational History    Not on file   Social Needs    Financial resource strain: Not on file    Food insecurity     Worry: Not on file     Inability: Not on file    Transportation needs     Medical: Not on file     Non-medical: Not on file   Tobacco Use    Smoking status: Current Every Day Smoker     Packs/day: 1.00     Years: 55.00     Pack years: 55.00     Types: Cigarettes    Smokeless tablet Take 1 tablet by mouth daily (Patient taking differently: Take 40 mg by mouth daily as needed (for leg swelling) ) 90 tablet 0    nitroGLYCERIN (NITROSTAT) 0.4 MG SL tablet Place 1 tablet under the tongue every 5 minutes as needed for Chest pain. 25 tablet 3    aspirin 81 MG EC tablet Take 81 mg by mouth daily. Indications: stopped for surgery      hydroCHLOROthiazide (HYDRODIURIL) 25 MG tablet Take 1 tablet by mouth every morning 30 tablet 3    tiotropium (SPIRIVA) 18 MCG inhalation capsule Inhale 1 capsule into the lungs daily for 7 days Begin 11/8/19. Take until day of surgery. 7 capsule 0    blood glucose test strips (CONTOUR NEXT TEST) strip TEST ONCE DAILY DX: E11.9 50 strip 11    lisinopril (PRINIVIL;ZESTRIL) 40 MG tablet Take 1 tablet by mouth daily 90 tablet 3    metoprolol succinate (TOPROL XL) 50 MG extended release tablet Take 1 tablet by mouth daily 90 tablet 3     Allergies   Allergen Reactions    Latex Rash    Methadone Other (See Comments)     Pt collapsed and was hospitalized for 11 days; organ shutdown    Morphine Rash     Skin peeling off    Wellbutrin [Bupropion Hcl]      Headache; constant pins and needles sensation       REVIEW OF SYSTEMS  10 systems reviewed, pertinent positives per HPI otherwise noted to be negative. PHYSICAL EXAM  BP (!) 181/82   Pulse 109   Temp 98.6 °F (37 °C)   Resp 28   Ht 5' 11\" (1.803 m)   Wt 200 lb (90.7 kg)   SpO2 94%   BMI 27.89 kg/m²   GENERAL APPEARANCE: Awake and alert. Cooperative. No acute distress. HEAD: Normocephalic. Atraumatic. EYES: PERRL. EOM's grossly intact. ENT: Mucous membranes are moist.   NECK: Supple, trachea midline. HEART: RRR. Normal S1S2, no rubs, gallops, or murmurs noted  LUNGS: Tachypnea. No accessory muscle use. Diffuse rhonchi that is worse on the right side. ABDOMEN: Soft. Non-distended. Non-tender. No guarding or rebound. Normal bowel sounds. EXTREMITIES: No peripheral edema. MAEE.  No acute

## 2020-04-08 NOTE — ED TRIAGE NOTES
Brought in per EMS with c/o sob ongoing for 3 weeks worsening today with increased sob and chest heaviness

## 2020-04-09 ENCOUNTER — TELEPHONE (OUTPATIENT)
Dept: OTHER | Facility: CLINIC | Age: 75
End: 2020-04-09

## 2020-04-10 NOTE — TELEPHONE ENCOUNTER
Patient contacted regarding COVID-19 risk and screening.  Unable to reach patient, left VM and requested return call 22-Nov-2017 05:59 22-Nov-2017 07:18

## 2020-04-12 LAB
BLOOD CULTURE, ROUTINE: NORMAL
CULTURE, BLOOD 2: NORMAL

## 2020-04-14 ENCOUNTER — TELEPHONE (OUTPATIENT)
Dept: PULMONOLOGY | Age: 75
End: 2020-04-14

## 2020-04-14 NOTE — TELEPHONE ENCOUNTER
Thang Colon from scheduling called stating that all ultrasounds have to be approved from radiologist with ordering MD.  Pt is scheduled for thoracentesis on 4/20/20 and needs to be approved by radiologist.  Call [phone].

## 2020-04-15 ENCOUNTER — TELEPHONE (OUTPATIENT)
Dept: INTERVENTIONAL RADIOLOGY/VASCULAR | Age: 75
End: 2020-04-15

## 2020-04-16 ENCOUNTER — TELEPHONE (OUTPATIENT)
Dept: PULMONOLOGY | Age: 75
End: 2020-04-16

## 2020-04-20 ENCOUNTER — HOSPITAL ENCOUNTER (OUTPATIENT)
Age: 75
Discharge: HOME | End: 2020-04-20
Payer: MEDICARE

## 2020-04-20 ENCOUNTER — HOSPITAL ENCOUNTER (OUTPATIENT)
Dept: GENERAL RADIOLOGY | Age: 75
Discharge: HOME | End: 2020-04-20
Payer: MEDICARE

## 2020-04-20 ENCOUNTER — HOSPITAL ENCOUNTER (OUTPATIENT)
Dept: ULTRASOUND IMAGING | Age: 75
Discharge: HOME | End: 2020-04-20
Payer: MEDICARE

## 2020-04-20 VITALS
SYSTOLIC BLOOD PRESSURE: 119 MMHG | OXYGEN SATURATION: 97 % | HEART RATE: 71 BPM | DIASTOLIC BLOOD PRESSURE: 57 MMHG | RESPIRATION RATE: 20 BRPM

## 2020-04-20 LAB
APPEARANCE FLUID: CLEAR
CELL COUNT FLUID TYPE: NORMAL
CLOT EVALUATION: NORMAL
COLOR FLUID: YELLOW
EOSINOPHIL FLUID: 6 %
FLUID TYPE: NORMAL
INR BLD: 1.16 (ref 0.86–1.14)
LACTATE DEHYDROGENASE: 501 U/L (ref 100–190)
LD, FLUID: 539 U/L
LYMPHOCYTES, BODY FLUID: 45 %
MACROPHAGE FLUID: 34 %
NEUTROPHIL, FLUID: 15 %
NUCLEATED CELLS FLUID: 1033 /CUMM
NUMBER OF CELLS COUNTED FLUID: 100
PH, BODY FLUID: 8.2
PROTEIN FLUID: 4.2 G/DL
PROTHROMBIN TIME: 13.5 SEC (ref 10–13.2)
RBC FLUID: 1500 /CUMM
TOTAL PROTEIN: 6.9 G/DL (ref 6.4–8.2)

## 2020-04-20 PROCEDURE — 84157 ASSAY OF PROTEIN OTHER: CPT

## 2020-04-20 PROCEDURE — 87070 CULTURE OTHR SPECIMN AEROBIC: CPT

## 2020-04-20 PROCEDURE — 32555 ASPIRATE PLEURA W/ IMAGING: CPT

## 2020-04-20 PROCEDURE — 88184 FLOWCYTOMETRY/ TC 1 MARKER: CPT

## 2020-04-20 PROCEDURE — 87205 SMEAR GRAM STAIN: CPT

## 2020-04-20 PROCEDURE — 36415 COLL VENOUS BLD VENIPUNCTURE: CPT

## 2020-04-20 PROCEDURE — 89051 BODY FLUID CELL COUNT: CPT

## 2020-04-20 PROCEDURE — 83615 LACTATE (LD) (LDH) ENZYME: CPT

## 2020-04-20 PROCEDURE — 88185 FLOWCYTOMETRY/TC ADD-ON: CPT

## 2020-04-20 PROCEDURE — 85610 PROTHROMBIN TIME: CPT

## 2020-04-20 PROCEDURE — 88305 TISSUE EXAM BY PATHOLOGIST: CPT

## 2020-04-20 PROCEDURE — 83986 ASSAY PH BODY FLUID NOS: CPT

## 2020-04-20 PROCEDURE — 88112 CYTOPATH CELL ENHANCE TECH: CPT

## 2020-04-20 PROCEDURE — 84155 ASSAY OF PROTEIN SERUM: CPT

## 2020-04-20 PROCEDURE — 71045 X-RAY EXAM CHEST 1 VIEW: CPT

## 2020-04-20 NOTE — PROGRESS NOTES
Thoracentesis complete per Dr. Deneice Hatchet, 1100 ml of yellow fluid removed and sent to the lab. Patient tolerated well, CXR ordered.   Anne GOMEZ RN

## 2020-04-20 NOTE — PROGRESS NOTES
CXR negative for pneumothorax per Dr. Vandana Rodas. Discharge instructions given, patient verbalized understanding. Patient discharged in stable condition.   Anne GOMEZ RN

## 2020-04-22 ENCOUNTER — HOSPITAL ENCOUNTER (OUTPATIENT)
Age: 75
Setting detail: OUTPATIENT SURGERY
Discharge: HOME | End: 2020-04-22
Attending: INTERNAL MEDICINE | Admitting: INTERNAL MEDICINE
Payer: MEDICARE

## 2020-04-22 ENCOUNTER — ANESTHESIA EVENT (OUTPATIENT)
Dept: ENDOSCOPY | Age: 75
End: 2020-04-22
Payer: MEDICARE

## 2020-04-22 ENCOUNTER — ANESTHESIA (OUTPATIENT)
Dept: ENDOSCOPY | Age: 75
End: 2020-04-22
Payer: MEDICARE

## 2020-04-22 VITALS
TEMPERATURE: 98.1 F | RESPIRATION RATE: 20 BRPM | HEART RATE: 82 BPM | DIASTOLIC BLOOD PRESSURE: 67 MMHG | SYSTOLIC BLOOD PRESSURE: 132 MMHG | WEIGHT: 200 LBS | OXYGEN SATURATION: 98 % | HEIGHT: 71 IN | BODY MASS INDEX: 28 KG/M2

## 2020-04-22 VITALS
RESPIRATION RATE: 1 BRPM | OXYGEN SATURATION: 97 % | DIASTOLIC BLOOD PRESSURE: 91 MMHG | SYSTOLIC BLOOD PRESSURE: 159 MMHG

## 2020-04-22 LAB
GLUCOSE BLD-MCNC: 148 MG/DL (ref 70–99)
GLUCOSE BLD-MCNC: 51 MG/DL (ref 70–99)
GLUCOSE BLD-MCNC: 76 MG/DL (ref 70–99)
PERFORMED ON: ABNORMAL
PERFORMED ON: ABNORMAL
PERFORMED ON: NORMAL

## 2020-04-22 PROCEDURE — 3609011300 HC BRONCHOSCOPY BRONCHIAL/ENDOBRNCL BX 1+ SITES: Performed by: INTERNAL MEDICINE

## 2020-04-22 PROCEDURE — 7100000010 HC PHASE II RECOVERY - FIRST 15 MIN: Performed by: INTERNAL MEDICINE

## 2020-04-22 PROCEDURE — 3700000001 HC ADD 15 MINUTES (ANESTHESIA): Performed by: INTERNAL MEDICINE

## 2020-04-22 PROCEDURE — 31652 BRONCH EBUS SAMPLNG 1/2 NODE: CPT | Performed by: INTERNAL MEDICINE

## 2020-04-22 PROCEDURE — 2709999900 HC NON-CHARGEABLE SUPPLY: Performed by: INTERNAL MEDICINE

## 2020-04-22 PROCEDURE — 88305 TISSUE EXAM BY PATHOLOGIST: CPT

## 2020-04-22 PROCEDURE — 88342 IMHCHEM/IMCYTCHM 1ST ANTB: CPT

## 2020-04-22 PROCEDURE — 6360000002 HC RX W HCPCS: Performed by: NURSE ANESTHETIST, CERTIFIED REGISTERED

## 2020-04-22 PROCEDURE — 3609010800 HC BRONCHOSCOPY ALVEOLAR LAVAGE: Performed by: INTERNAL MEDICINE

## 2020-04-22 PROCEDURE — 88177 CYTP FNA EVAL EA ADDL: CPT

## 2020-04-22 PROCEDURE — 7100000011 HC PHASE II RECOVERY - ADDTL 15 MIN: Performed by: INTERNAL MEDICINE

## 2020-04-22 PROCEDURE — 6370000000 HC RX 637 (ALT 250 FOR IP): Performed by: NURSE ANESTHETIST, CERTIFIED REGISTERED

## 2020-04-22 PROCEDURE — 87205 SMEAR GRAM STAIN: CPT

## 2020-04-22 PROCEDURE — 87070 CULTURE OTHR SPECIMN AEROBIC: CPT

## 2020-04-22 PROCEDURE — 3609011900 HC BRONCHOSCOPY NEEDLE BX TRACHEA MAIN STEM&/BRON: Performed by: INTERNAL MEDICINE

## 2020-04-22 PROCEDURE — 88112 CYTOPATH CELL ENHANCE TECH: CPT

## 2020-04-22 PROCEDURE — 3700000000 HC ANESTHESIA ATTENDED CARE: Performed by: INTERNAL MEDICINE

## 2020-04-22 PROCEDURE — 31625 BRONCHOSCOPY W/BIOPSY(S): CPT | Performed by: INTERNAL MEDICINE

## 2020-04-22 PROCEDURE — 31624 DX BRONCHOSCOPE/LAVAGE: CPT | Performed by: INTERNAL MEDICINE

## 2020-04-22 PROCEDURE — 88341 IMHCHEM/IMCYTCHM EA ADD ANTB: CPT

## 2020-04-22 PROCEDURE — 3609011200 HC BRONCHOSCOPY W/TRANSBRONCL NDL ASPIR BX EA ADDL LOBE: Performed by: INTERNAL MEDICINE

## 2020-04-22 PROCEDURE — 3603165200 HC BRNCHSC EBUS GUIDED SAMPL 1/2 NODE STATION/STRUX: Performed by: INTERNAL MEDICINE

## 2020-04-22 PROCEDURE — 31623 DX BRONCHOSCOPE/BRUSH: CPT | Performed by: INTERNAL MEDICINE

## 2020-04-22 PROCEDURE — 2580000003 HC RX 258: Performed by: NURSE ANESTHETIST, CERTIFIED REGISTERED

## 2020-04-22 PROCEDURE — 2580000003 HC RX 258: Performed by: ANESTHESIOLOGY

## 2020-04-22 PROCEDURE — 88172 CYTP DX EVAL FNA 1ST EA SITE: CPT

## 2020-04-22 PROCEDURE — 2500000003 HC RX 250 WO HCPCS: Performed by: NURSE ANESTHETIST, CERTIFIED REGISTERED

## 2020-04-22 PROCEDURE — 2720000010 HC SURG SUPPLY STERILE: Performed by: INTERNAL MEDICINE

## 2020-04-22 PROCEDURE — 88173 CYTOPATH EVAL FNA REPORT: CPT

## 2020-04-22 PROCEDURE — 3609011100 HC BRONCHOSCOPY BRUSHINGS: Performed by: INTERNAL MEDICINE

## 2020-04-22 RX ORDER — LIDOCAINE HYDROCHLORIDE 40 MG/ML
SOLUTION TOPICAL PRN
Status: DISCONTINUED | OUTPATIENT
Start: 2020-04-22 | End: 2020-04-22 | Stop reason: SDUPTHER

## 2020-04-22 RX ORDER — SUCCINYLCHOLINE CHLORIDE 20 MG/ML
INJECTION INTRAMUSCULAR; INTRAVENOUS PRN
Status: DISCONTINUED | OUTPATIENT
Start: 2020-04-22 | End: 2020-04-22 | Stop reason: SDUPTHER

## 2020-04-22 RX ORDER — ROCURONIUM BROMIDE 10 MG/ML
INJECTION, SOLUTION INTRAVENOUS PRN
Status: DISCONTINUED | OUTPATIENT
Start: 2020-04-22 | End: 2020-04-22 | Stop reason: SDUPTHER

## 2020-04-22 RX ORDER — DEXTROSE MONOHYDRATE 25 G/50ML
25 INJECTION, SOLUTION INTRAVENOUS ONCE
Status: COMPLETED | OUTPATIENT
Start: 2020-04-22 | End: 2020-04-22

## 2020-04-22 RX ORDER — EPHEDRINE SULFATE 50 MG/ML
INJECTION INTRAVENOUS PRN
Status: DISCONTINUED | OUTPATIENT
Start: 2020-04-22 | End: 2020-04-22 | Stop reason: SDUPTHER

## 2020-04-22 RX ORDER — ONDANSETRON 2 MG/ML
INJECTION INTRAMUSCULAR; INTRAVENOUS PRN
Status: DISCONTINUED | OUTPATIENT
Start: 2020-04-22 | End: 2020-04-22 | Stop reason: SDUPTHER

## 2020-04-22 RX ORDER — SODIUM CHLORIDE, SODIUM LACTATE, POTASSIUM CHLORIDE, CALCIUM CHLORIDE 600; 310; 30; 20 MG/100ML; MG/100ML; MG/100ML; MG/100ML
INJECTION, SOLUTION INTRAVENOUS CONTINUOUS PRN
Status: DISCONTINUED | OUTPATIENT
Start: 2020-04-22 | End: 2020-04-22 | Stop reason: SDUPTHER

## 2020-04-22 RX ORDER — LIDOCAINE HYDROCHLORIDE 20 MG/ML
INJECTION, SOLUTION INFILTRATION; PERINEURAL PRN
Status: DISCONTINUED | OUTPATIENT
Start: 2020-04-22 | End: 2020-04-22 | Stop reason: SDUPTHER

## 2020-04-22 RX ORDER — ETOMIDATE 2 MG/ML
INJECTION INTRAVENOUS PRN
Status: DISCONTINUED | OUTPATIENT
Start: 2020-04-22 | End: 2020-04-22 | Stop reason: SDUPTHER

## 2020-04-22 RX ORDER — PROPOFOL 10 MG/ML
INJECTION, EMULSION INTRAVENOUS PRN
Status: DISCONTINUED | OUTPATIENT
Start: 2020-04-22 | End: 2020-04-22 | Stop reason: SDUPTHER

## 2020-04-22 RX ADMIN — EPHEDRINE SULFATE 10 MG: 50 INJECTION INTRAVENOUS at 11:08

## 2020-04-22 RX ADMIN — SUCCINYLCHOLINE CHLORIDE 100 MG: 20 INJECTION, SOLUTION INTRAMUSCULAR; INTRAVENOUS at 10:56

## 2020-04-22 RX ADMIN — DEXTROSE 50 % IN WATER (D50W) INTRAVENOUS SYRINGE 25 G: at 10:07

## 2020-04-22 RX ADMIN — ROCURONIUM BROMIDE 40 MG: 10 INJECTION, SOLUTION INTRAVENOUS at 11:00

## 2020-04-22 RX ADMIN — ETOMIDATE 10 MG: 2 INJECTION, SOLUTION INTRAVENOUS at 10:56

## 2020-04-22 RX ADMIN — PROPOFOL 50 MG: 10 INJECTION, EMULSION INTRAVENOUS at 10:56

## 2020-04-22 RX ADMIN — EPHEDRINE SULFATE 10 MG: 50 INJECTION INTRAVENOUS at 11:05

## 2020-04-22 RX ADMIN — LIDOCAINE HYDROCHLORIDE 4 ML: 40 SPRAY LARYNGEAL; TRANSTRACHEAL at 10:57

## 2020-04-22 RX ADMIN — EPHEDRINE SULFATE 10 MG: 50 INJECTION INTRAVENOUS at 11:02

## 2020-04-22 RX ADMIN — LIDOCAINE HYDROCHLORIDE 50 MG: 20 INJECTION, SOLUTION INFILTRATION; PERINEURAL at 10:56

## 2020-04-22 RX ADMIN — EPHEDRINE SULFATE 5 MG: 50 INJECTION INTRAVENOUS at 10:56

## 2020-04-22 RX ADMIN — EPHEDRINE SULFATE 15 MG: 50 INJECTION INTRAVENOUS at 11:10

## 2020-04-22 RX ADMIN — EPHEDRINE SULFATE 10 MG: 50 INJECTION INTRAVENOUS at 11:23

## 2020-04-22 RX ADMIN — ONDANSETRON 4 MG: 2 INJECTION, SOLUTION INTRAMUSCULAR; INTRAVENOUS at 11:35

## 2020-04-22 RX ADMIN — ROCURONIUM BROMIDE 10 MG: 10 INJECTION, SOLUTION INTRAVENOUS at 10:56

## 2020-04-22 RX ADMIN — SUGAMMADEX 200 MG: 100 INJECTION, SOLUTION INTRAVENOUS at 11:35

## 2020-04-22 RX ADMIN — SODIUM CHLORIDE, POTASSIUM CHLORIDE, SODIUM LACTATE AND CALCIUM CHLORIDE: 600; 310; 30; 20 INJECTION, SOLUTION INTRAVENOUS at 10:37

## 2020-04-22 ASSESSMENT — PULMONARY FUNCTION TESTS
PIF_VALUE: 29
PIF_VALUE: 20
PIF_VALUE: 1
PIF_VALUE: 12
PIF_VALUE: 1
PIF_VALUE: 23
PIF_VALUE: 20
PIF_VALUE: 23
PIF_VALUE: 26
PIF_VALUE: 21
PIF_VALUE: 7
PIF_VALUE: 20
PIF_VALUE: 25
PIF_VALUE: 1
PIF_VALUE: 2
PIF_VALUE: 25
PIF_VALUE: 10
PIF_VALUE: 26
PIF_VALUE: 20
PIF_VALUE: 11
PIF_VALUE: 25
PIF_VALUE: 26
PIF_VALUE: 18
PIF_VALUE: 20
PIF_VALUE: 12
PIF_VALUE: 20
PIF_VALUE: 25
PIF_VALUE: 20
PIF_VALUE: 16
PIF_VALUE: 20
PIF_VALUE: 20
PIF_VALUE: 2
PIF_VALUE: 29
PIF_VALUE: 25
PIF_VALUE: 26
PIF_VALUE: 1
PIF_VALUE: 1
PIF_VALUE: 20
PIF_VALUE: 2
PIF_VALUE: 2
PIF_VALUE: 33
PIF_VALUE: 26
PIF_VALUE: 20
PIF_VALUE: 15
PIF_VALUE: 26
PIF_VALUE: 16
PIF_VALUE: 23
PIF_VALUE: 16

## 2020-04-22 ASSESSMENT — PAIN DESCRIPTION - DESCRIPTORS: DESCRIPTORS: ACHING

## 2020-04-22 ASSESSMENT — PAIN - FUNCTIONAL ASSESSMENT: PAIN_FUNCTIONAL_ASSESSMENT: 0-10

## 2020-04-22 NOTE — ANESTHESIA POSTPROCEDURE EVALUATION
Department of Anesthesiology  Postprocedure Note    Patient: Maria Eugenia Ruby  MRN: [id number]  YOB: 1945  Date of evaluation: 4/22/2020  Time:  2:03 PM     Procedure Summary     Date:  04/22/20 Room / Location:  [address] [place] [address]    Anesthesia Start:  0211 Anesthesia Stop:  1150    Procedures:       EBUS NF W/ANES. (10:30) NO LABS (N/A )      BRONCHOSCOPY ALVEOLAR LAVAGE      BRONCHOSCOPY BIOPSY BRONCHUS      BRONCHOSCOPY BRUSHINGS      BRONCHOSCOPY/TRANSBRONCHIAL NEEDLE BIOPSY      BRONCHOSCOPY/TRANSBRONCHIAL NEEDLE BIOPSY ADDL LOBE Diagnosis:  (LUNG MASS, PLEURAL EFFUSION)    Surgeon:  Zoe Dubois MD Responsible Provider:  Saturnino Thacker MD    Anesthesia Type:  general ASA Status:  4          Anesthesia Type: general    Juancarlos Phase I: Juancarlos Score: 8    Juancarlos Phase II: Juancarlos Score: 8    Last vitals: Reviewed and per EMR flowsheets.        Anesthesia Post Evaluation    Comments: Postoperative Anesthesia Note    Name:    Maria Eugenia Ruby  MRN:      [id number]    Patient Vitals in the past 12 hrs:  04/22/20 1245, BP:132/67, Pulse:82, Resp:20, SpO2:98 %  04/22/20 1230, BP:114/65, Pulse:83, Resp:17, SpO2:92 %  04/22/20 1215, BP:122/60, Pulse:80, Resp:17, SpO2:93 %  04/22/20 1200, BP:112/62, Pulse:78, Resp:16, SpO2:93 %  04/22/20 1155, BP:(!) 119/52, Pulse:82, Resp:18, SpO2:96 %  04/22/20 1151, BP:132/62, Pulse:82, Resp:22, SpO2:98 %  04/22/20 1146, BP:(!) 145/66, Temp:98.1 °F (36.7 °C), Temp src:Temporal, Pulse:86, Resp:20, SpO2:97 %  04/22/20 0945, BP:100/74, Temp:97.6 °F (36.4 °C), Temp src:Temporal, Pulse:70, Resp:24, SpO2:99 %, Height:5' 11\" (1.803 m), Weight:200 lb (90.7 kg)     LABS:    CBC  Lab Results       Component                Value               Date/Time                  WBC                      7.9                 04/08/2020 12:40 AM        HGB                      13.4 (L)            04/08/2020 12:40 AM        HCT                      40.8

## 2020-04-22 NOTE — H&P
Fiberoptic bronchoscopy history:     Patient with suspected malignancy, thoracentesis is non-diagnostic. Has Mediastinal lymphadenopathy and mediastinal mass; requires fiberoptic bronchoscopy with biopsy. Patient is allergic to latex; methadone; morphine; and wellbutrin [bupropion hcl].     Past Medical History:   Diagnosis Date    Abnormal CT of the chest     Anxiety     Back pain     CAD (coronary artery disease)     Cancer (HCC)     skin    CHF (congestive heart failure) (HCC)     COPD (chronic obstructive pulmonary disease) (HCC)     Depression     Diabetes mellitus ([address]     Heart disease     Hernia     History of blood transfusion     Hyperlipidemia     Hypertension     Osteoarthritis     Prolonged emergence from general anesthesia     Pulmonary nodule     Rheumatic fever     Type II or unspecified type diabetes mellitus without mention of complication, not stated as uncontrolled        Past Surgical History:   Procedure Laterality Date    BRONCHOSCOPY Bilateral 10/09/2019    EBUS    BRONCHOSCOPY N/A 10/9/2019    EBUS NF W/ANES. (07:00) NO LABS [facility]  10[facility]2019    BRONCHOSCOPY ALVEOLAR LAVAGE performed by Don Johnson MD at [facility] 149  10/9/2019    BRONCHOSCOPY/TR[facility]    BRONCHOSCOPY/TRANSBRONCHIAL LUNG BIOPSY ADDL LOBE [facility]      stents x4    CIRCUMCISION      DENTAL SURGERY      EPIDURAL STEROID INJECTION Bilateral 12/17/2018    BILATERAL LUMBAR THREE FOUR EPIDURAL STEROID INJECTION SITE CONFIRMED BY FLUOROSCOPY performed by Bryan Palacio MD at [facility] DX/THER SBST INTRLMNR CRV/THRC W/IMG GDN Right 7/31/2018    RIGHT LUMBAR FIVE SIX EPIDURAL STEROID INJECTION SITE CONFIRMED BY FLUOROSCOPY performed by [facility]

## 2020-04-22 NOTE — ANESTHESIA PRE PROCEDURE
Department of Anesthesiology  Preprocedure Note       Name:  Ciara Villalobos   Age:  70 y.o.  :  1945                                          MRN:  [id number]         Date:  2020      Surgeon: Mortimer Gerlach):  Rex Chavira MD    Procedure: EBUS NF W/ANES. (10:30) NO LABS (N/A )    Medications prior to admission:   Prior to Admission medications    Medication Sig Start Date End Date Taking?  Authorizing Provider   OXYGEN Inhale into the lungs    Historical Provider, MD   HYDROcodone-acetaminophen ([facility]) 5-325 MG per tablet TAKE ONE TABLET BY MOUTH EVERY 8 HOURS AS NEEDED FOR PAIN FOR UP TO 30 DAYS 20  Theodora Shah MD   hydroCHLOROthiazide (HYDRODIURIL) 25 MG tablet Take 1 tablet by mouth every morning 3/9/20   Ricarda Elizondo MD   omeprazole (PRILOSEC) 20 MG delayed release capsule TAKE 1 CAPSULE BY MOUTH DAILY 3/2/20   Theodora Shah MD   metFORMIN (GLUCOPHAGE) 500 MG tablet TAKE ONE TABLET EVERY MORNING AND ONE TABLET EVERY EVENING 3/2/20   Theodora Shah MD   glimepiride (AMARYL) 4 MG tablet TAKE 1 TABLET BY MOUTH EVERY MORNING BEFORE BREAKFAST 3/2/20   Theodora Shah MD   zolpidem (AMBIEN) 10 MG tablet TAKE ONE TABLET BY MOUTH NIGHTLY AS NEEDED FOR SLEEP FOR UP TO 90 DAYS 3/2/20 5/31/20  Theodora Shah MD   albuterol sulfate  (90 Base) MCG/ACT inhaler Inhale 2 puffs into the lungs every 6 hours as needed for Wheezing 10/8/19   Theodora Shah MD   blood glucose test strips (CONTOUR NEXT TEST) strip TEST ONCE DAILY DX: E11.9 10/3/19   Theodora Shah MD   pravastatin (PRAVACHOL) 80 MG tablet Take 1 tablet by mouth daily 10/3/19   Tomas Tobias MD   lisinopril (PRINIVIL;ZESTRIL) 40 MG tablet Take 1 tablet by mouth daily 19   Tomas Tobias MD   metoprolol succinate (TOPROL XL) 50 MG extended release tablet Take 1 tablet by mouth daily 19   Tomas Tobias MD   furosemide (LASIX) 40 MG tablet Take 1 tablet by mouth daily  Patient BRONCHOSCOPY/TRANSBRONCHIAL NEEDLE BIOPSY performed by Jennifer Gotti MD at [address]  10/9/2019    BRONCHOSCOPY/TRANSBRONCHIAL LUNG BIOPSY ADDL LOBE performed by Jennifer Gotti MD at [address]      stents x4    CIRCUMCISION      DENTAL SURGERY      EPIDURAL STEROID INJECTION Bilateral 12/17/2018    BILATERAL LUMBAR THREE FOUR EPIDURAL STEROID INJECTION SITE CONFIRMED BY FLUOROSCOPY performed by Lilli Jarquin MD at [facility] DX/THER SBST INTRLMNR CRV/THRC W/IMG GDN Right 7/31/2018    RIGHT LUMBAR FIVE SIX EPIDURAL STEROID INJECTION SITE CONFIRMED BY FLUOROSCOPY performed by Lilli Jarquin MD at [facility] DX/THER SBST INTRLMNR CRV/THRC W/IMG GDN Bilateral 9/18/2018    RIGHT LUMBAR THREE, LUBMAR FOUR, LUMBAR FIVE MEDIAL BRANCH BLOCK SITE CONFIRMED BY FLUOROSCOPY performed by Lilli Jarquin MD at [facility] DX/THER SBST INTRLMNR CRV/THRC W/IMG GDN Bilateral 10/8/2018    BILATERAL LUMBAR THREE, LUMBAR FOUR, LUMBAR FIVE RADIOFREQUENCY ABLATION SITE CONFIRMED BY FLUOROSCOPY performed by Lilli Jarquin MD at [address] [place] [address] N/A 8/27/2019    MIDLINE LUMBAR SPINAL CORD STIMULATOR TRIAL WITH [employer] performed by Gurwinder Baldwin MD at [place] [address]       Social History:    Social History     Tobacco Use    Smoking status: Current Every Day Smoker     Packs/day: 1.00     Years: 55.00     Pack years: 55.00     Types: Cigarettes    Smokeless tobacco: Never Used    Tobacco comment: 1 ppd as of 10/28/19. Substance Use Topics    Alcohol use: No                                Ready to quit: Not Answered  Counseling given: Not Answered  Comment: 1 ppd as of 10/28/19. Vital Signs (Current): There were no vitals filed for this visit.                                            BP Readings from Last 3 Encounters:   04/20/20 (!) 119/57 Trivial mitral regurgitation. Mild aortic regurgitation. Systolic pulmonary artery pressure (SPAP) is normal and estimated at 30mmHg   (RA pressure 3mmHg). Signature      ------------------------------------------------------------------   Electronically signed by Erin Coyne MD, [facility]   (Interpreting physician) on 05/17/2017 at 03:11 PM   ------------------------------------------------------------------    Conclusions        Summary    - Abnormal high risk myocardial perfusion study due to severe left    ventricular dysfunction.    - There is a large size severe fixed defect involving the apex, apical    segments and mid anteroseptum consistent with prior infarct.    - There is no inducible ischemia.    - Left ventricular cavity is dilated.    - Anteroseptum, apical segments and apex are akinetic.    - Left ventricular systolic function is severely reduced with ejection    fraction of 30 %. 2/17    Sinus tachycardiaLeft anterior fascicular blockLeft ventricular hypertrophy with QRS widening and repolarization abnormalityAnteroseptal infarct (cited on or before 08-SEP-2008)Abnormal ECGWhen compared with ECG of 14-FEB-2017 10:48, (unconfirmed)Vent. rate has increased BY  35 BPMT wave inversion less evident in Lateral leadsConfirmed by Mark Darby MD, [address] (1986) on 4/8/2020 10:54:32 AM    Pre-Operative Diagnosis: LUNG MASS, PLEURAL EFFUSION    70 y.o.   BMI:  Body mass index is 27.89 kg/m².      Vitals:    04/22/20 0945   BP: 100/74   Pulse: 70   Resp: 24   Temp: 97.6 °F (36.4 °C)   TempSrc: Temporal   SpO2: 99%   Weight: 200 lb (90.7 kg)   Height: 5' 11\" (1.803 m)       Allergies   Allergen Reactions    Latex Rash    Methadone Other (See Comments)     Pt collapsed and was hospitalized for 11 days; organ shutdown    Morphine Rash     Skin peeling off    Wellbutrin [Bupropion Hcl]      Headache; constant pins and needles sensation       Social History     Tobacco Use    Smoking status: Current

## 2020-04-22 NOTE — PROGRESS NOTES
IV access removed. D/C instructions reviewed with Pt, stated understanding.  Pt back to baseline with O2 at 2 lt per NC

## 2020-04-23 ENCOUNTER — TELEPHONE (OUTPATIENT)
Dept: INTERNAL MEDICINE CLINIC | Age: 75
End: 2020-04-23

## 2020-04-23 LAB
BODY FLUID CULTURE, STERILE: NORMAL
GRAM STAIN RESULT: NORMAL

## 2020-04-24 ENCOUNTER — TELEPHONE (OUTPATIENT)
Dept: INTERNAL MEDICINE CLINIC | Age: 75
End: 2020-04-24

## 2020-04-24 RX ORDER — AMOXICILLIN 500 MG/1
500 CAPSULE ORAL 3 TIMES DAILY
Qty: 15 CAPSULE | Refills: 0 | Status: SHIPPED | OUTPATIENT
Start: 2020-04-24 | End: 2020-04-29

## 2020-04-24 RX ORDER — HYDROXYZINE HYDROCHLORIDE 10 MG/1
10 TABLET, FILM COATED ORAL 2 TIMES DAILY PRN
Qty: 20 TABLET | Refills: 0 | Status: SHIPPED | OUTPATIENT
Start: 2020-04-24 | End: 2020-05-01 | Stop reason: SDUPTHER

## 2020-04-24 NOTE — TELEPHONE ENCOUNTER
Appt with Dr Pastor Roach cancelled. Appt ethan with [facility] 4/29/20 @ 8:30am.    Info faxed to [facility]. Patient informed of appt.     Watch for mae

## 2020-04-24 NOTE — TELEPHONE ENCOUNTER
----- Message from Mar Scott MD sent at 4/23/2020  9:00 AM EDT -----  Tried to reach again last night  No one answered  ----- Message -----  From: Debbie Bazzi  Sent: 4/22/2020   2:59 PM EDT  To: Mar Scott MD    Pt had a biopsy and was diagnosed with lung cancer. Pt is having pain from diaphragm down and pulmonologist said to contact you regarding this for possible kidney infection. Please advise.   [phone]

## 2020-05-01 ENCOUNTER — TELEPHONE (OUTPATIENT)
Dept: INTERNAL MEDICINE CLINIC | Age: 75
End: 2020-05-01

## 2020-05-01 RX ORDER — VARENICLINE TARTRATE 1 MG/1
1 TABLET, FILM COATED ORAL 2 TIMES DAILY
Qty: 180 TABLET | Refills: 1 | Status: SHIPPED | OUTPATIENT
Start: 2020-05-01 | End: 2020-05-05 | Stop reason: CLARIF

## 2020-05-01 RX ORDER — HYDROXYZINE HYDROCHLORIDE 10 MG/1
TABLET, FILM COATED ORAL
Qty: 40 TABLET | Refills: 0 | Status: SHIPPED | OUTPATIENT
Start: 2020-05-01 | End: 2020-05-12

## 2020-05-01 NOTE — TELEPHONE ENCOUNTER
----- Message from Nita Ho MD sent at 5/1/2020  3:02 PM EDT -----  Should get to hospital here or at [facility] right away for IV fluids  ----- Message -----  From: Mark Mckenna  Sent: 5/1/2020   1:38 PM EDT  To: Nita Ho MD    [facility] called to let you know they had to draw labs on pt for a gfr for an mri and they had some critical values. Pt's sodium was 123 and glucose was 42. Please advise. Last appt. 10-8-19. Next appt. 6-12-20.

## 2020-05-04 ENCOUNTER — APPOINTMENT (OUTPATIENT)
Dept: GENERAL RADIOLOGY | Age: 75
End: 2020-05-04
Payer: MEDICARE

## 2020-05-04 ENCOUNTER — TELEPHONE (OUTPATIENT)
Dept: PULMONOLOGY | Age: 75
End: 2020-05-04

## 2020-05-04 ENCOUNTER — HOSPITAL ENCOUNTER (EMERGENCY)
Age: 75
Discharge: HOME | End: 2020-05-04
Attending: EMERGENCY MEDICINE
Payer: MEDICARE

## 2020-05-04 VITALS
TEMPERATURE: 97.5 F | OXYGEN SATURATION: 97 % | DIASTOLIC BLOOD PRESSURE: 67 MMHG | WEIGHT: 200 LBS | HEART RATE: 107 BPM | RESPIRATION RATE: 22 BRPM | BODY MASS INDEX: 27.89 KG/M2 | SYSTOLIC BLOOD PRESSURE: 106 MMHG

## 2020-05-04 LAB
A/G RATIO: 1 (ref 1.1–2.2)
ALBUMIN SERPL-MCNC: 3.7 G/DL (ref 3.4–5)
ALP BLD-CCNC: 118 U/L (ref 40–129)
ALT SERPL-CCNC: 18 U/L (ref 10–40)
ANION GAP SERPL CALCULATED.3IONS-SCNC: 13 MMOL/L (ref 3–16)
APTT: 31.8 SEC (ref 24.2–36.2)
AST SERPL-CCNC: 42 U/L (ref 15–37)
BASE EXCESS VENOUS: -0.5 MMOL/L (ref -3–3)
BASOPHILS ABSOLUTE: 0.1 K/UL (ref 0–0.2)
BASOPHILS RELATIVE PERCENT: 0.5 %
BILIRUB SERPL-MCNC: 0.3 MG/DL (ref 0–1)
BUN BLDV-MCNC: 18 MG/DL (ref 7–20)
CALCIUM SERPL-MCNC: 9.7 MG/DL (ref 8.3–10.6)
CARBOXYHEMOGLOBIN: 4.7 % (ref 0–1.5)
CHLORIDE BLD-SCNC: 87 MMOL/L (ref 99–110)
CO2: 26 MMOL/L (ref 21–32)
CREAT SERPL-MCNC: 0.8 MG/DL (ref 0.8–1.3)
EKG ATRIAL RATE: 125 BPM
EKG DIAGNOSIS: NORMAL
EKG Q-T INTERVAL: 318 MS
EKG QRS DURATION: 128 MS
EKG QTC CALCULATION (BAZETT): 456 MS
EKG R AXIS: -53 DEGREES
EKG T AXIS: 70 DEGREES
EKG VENTRICULAR RATE: 124 BPM
EOSINOPHILS ABSOLUTE: 0.2 K/UL (ref 0–0.6)
EOSINOPHILS RELATIVE PERCENT: 1.6 %
FLUID TYPE: NORMAL
GFR AFRICAN AMERICAN: >60
GFR NON-AFRICAN AMERICAN: >60
GLOBULIN: 3.7 G/DL
GLUCOSE BLD-MCNC: 203 MG/DL (ref 70–99)
GLUCOSE, FLUID: 176 MG/DL
HCO3 VENOUS: 26 MMOL/L (ref 23–29)
HCT VFR BLD CALC: 38.2 % (ref 40.5–52.5)
HEMOGLOBIN: 12.3 G/DL (ref 13.5–17.5)
INR BLD: 1.13 (ref 0.86–1.14)
LACTATE DEHYDROGENASE: 622 U/L (ref 100–190)
LACTIC ACID, SEPSIS: 1.4 MMOL/L (ref 0.4–1.9)
LACTIC ACID, SEPSIS: 2.7 MMOL/L (ref 0.4–1.9)
LD, FLUID: 680 U/L
LIPASE: 33 U/L (ref 13–60)
LYMPHOCYTES ABSOLUTE: 1.7 K/UL (ref 1–5.1)
LYMPHOCYTES RELATIVE PERCENT: 11.5 %
MCH RBC QN AUTO: 27.1 PG (ref 26–34)
MCHC RBC AUTO-ENTMCNC: 32.2 G/DL (ref 31–36)
MCV RBC AUTO: 84 FL (ref 80–100)
METHEMOGLOBIN VENOUS: 0.3 %
MONOCYTES ABSOLUTE: 1.2 K/UL (ref 0–1.3)
MONOCYTES RELATIVE PERCENT: 8.2 %
NEUTROPHILS ABSOLUTE: 11.4 K/UL (ref 1.7–7.7)
NEUTROPHILS RELATIVE PERCENT: 78.2 %
O2 CONTENT, VEN: 16 VOL %
O2 SAT, VEN: 86 %
O2 THERAPY: ABNORMAL
PCO2, VEN: 50.3 MMHG (ref 40–50)
PDW BLD-RTO: 15.3 % (ref 12.4–15.4)
PH VENOUS: 7.33 (ref 7.35–7.45)
PH, BODY FLUID: 8.1
PLATELET # BLD: 756 K/UL (ref 135–450)
PMV BLD AUTO: 6.5 FL (ref 5–10.5)
PO2, VEN: 54.3 MMHG (ref 25–40)
POTASSIUM REFLEX MAGNESIUM: 4.9 MMOL/L (ref 3.5–5.1)
PRO-BNP: 1122 PG/ML (ref 0–124)
PROCALCITONIN: 0.13 NG/ML (ref 0–0.15)
PROTEIN FLUID: 3.4 G/DL
PROTHROMBIN TIME: 13.1 SEC (ref 10–13.2)
RBC # BLD: 4.54 M/UL (ref 4.2–5.9)
SODIUM BLD-SCNC: 126 MMOL/L (ref 136–145)
TCO2 CALC VENOUS: 28 MMOL/L
TOTAL PROTEIN: 7.4 G/DL (ref 6.4–8.2)
TROPONIN: <0.01 NG/ML
WBC # BLD: 14.6 K/UL (ref 4–11)

## 2020-05-04 PROCEDURE — 87116 MYCOBACTERIA CULTURE: CPT

## 2020-05-04 PROCEDURE — 84484 ASSAY OF TROPONIN QUANT: CPT

## 2020-05-04 PROCEDURE — 6370000000 HC RX 637 (ALT 250 FOR IP): Performed by: EMERGENCY MEDICINE

## 2020-05-04 PROCEDURE — 89051 BODY FLUID CELL COUNT: CPT

## 2020-05-04 PROCEDURE — 71045 X-RAY EXAM CHEST 1 VIEW: CPT

## 2020-05-04 PROCEDURE — 88305 TISSUE EXAM BY PATHOLOGIST: CPT

## 2020-05-04 PROCEDURE — 99285 EMERGENCY DEPT VISIT HI MDM: CPT

## 2020-05-04 PROCEDURE — 82803 BLOOD GASES ANY COMBINATION: CPT

## 2020-05-04 PROCEDURE — 83690 ASSAY OF LIPASE: CPT

## 2020-05-04 PROCEDURE — 85730 THROMBOPLASTIN TIME PARTIAL: CPT

## 2020-05-04 PROCEDURE — 80053 COMPREHEN METABOLIC PANEL: CPT

## 2020-05-04 PROCEDURE — 85610 PROTHROMBIN TIME: CPT

## 2020-05-04 PROCEDURE — 87015 SPECIMEN INFECT AGNT CONCNTJ: CPT

## 2020-05-04 PROCEDURE — 85025 COMPLETE CBC W/AUTO DIFF WBC: CPT

## 2020-05-04 PROCEDURE — 87040 BLOOD CULTURE FOR BACTERIA: CPT

## 2020-05-04 PROCEDURE — 93005 ELECTROCARDIOGRAM TRACING: CPT | Performed by: EMERGENCY MEDICINE

## 2020-05-04 PROCEDURE — 87070 CULTURE OTHR SPECIMN AEROBIC: CPT

## 2020-05-04 PROCEDURE — 36415 COLL VENOUS BLD VENIPUNCTURE: CPT

## 2020-05-04 PROCEDURE — 83605 ASSAY OF LACTIC ACID: CPT

## 2020-05-04 PROCEDURE — 88112 CYTOPATH CELL ENHANCE TECH: CPT

## 2020-05-04 PROCEDURE — 83880 ASSAY OF NATRIURETIC PEPTIDE: CPT

## 2020-05-04 PROCEDURE — 84145 PROCALCITONIN (PCT): CPT

## 2020-05-04 PROCEDURE — 84157 ASSAY OF PROTEIN OTHER: CPT

## 2020-05-04 PROCEDURE — 82945 GLUCOSE OTHER FLUID: CPT

## 2020-05-04 PROCEDURE — 83986 ASSAY PH BODY FLUID NOS: CPT

## 2020-05-04 PROCEDURE — 87205 SMEAR GRAM STAIN: CPT

## 2020-05-04 PROCEDURE — 83615 LACTATE (LD) (LDH) ENZYME: CPT

## 2020-05-04 PROCEDURE — 93010 ELECTROCARDIOGRAM REPORT: CPT | Performed by: INTERNAL MEDICINE

## 2020-05-04 PROCEDURE — 87102 FUNGUS ISOLATION CULTURE: CPT

## 2020-05-04 PROCEDURE — 87206 SMEAR FLUORESCENT/ACID STAI: CPT

## 2020-05-04 RX ORDER — AZITHROMYCIN 250 MG/1
TABLET, FILM COATED ORAL
Qty: 1 PACKET | Refills: 0 | Status: ON HOLD | OUTPATIENT
Start: 2020-05-04 | End: 2020-05-06 | Stop reason: HOSPADM

## 2020-05-04 RX ORDER — AMOXICILLIN AND CLAVULANATE POTASSIUM 875; 125 MG/1; MG/1
1 TABLET, FILM COATED ORAL 2 TIMES DAILY
Qty: 14 TABLET | Refills: 0 | Status: ON HOLD | OUTPATIENT
Start: 2020-05-04 | End: 2020-05-05

## 2020-05-04 RX ORDER — AZITHROMYCIN 250 MG/1
500 TABLET, FILM COATED ORAL ONCE
Status: COMPLETED | OUTPATIENT
Start: 2020-05-04 | End: 2020-05-04

## 2020-05-04 RX ORDER — AMOXICILLIN AND CLAVULANATE POTASSIUM 875; 125 MG/1; MG/1
1 TABLET, FILM COATED ORAL ONCE
Status: COMPLETED | OUTPATIENT
Start: 2020-05-04 | End: 2020-05-04

## 2020-05-04 RX ADMIN — AZITHROMYCIN MONOHYDRATE 500 MG: 250 TABLET ORAL at 04:10

## 2020-05-04 RX ADMIN — AMOXICILLIN AND CLAVULANATE POTASSIUM 1 TABLET: 875; 125 TABLET, FILM COATED ORAL at 04:10

## 2020-05-04 ASSESSMENT — PAIN DESCRIPTION - PAIN TYPE: TYPE: ACUTE PAIN

## 2020-05-04 ASSESSMENT — PAIN DESCRIPTION - LOCATION: LOCATION: CHEST

## 2020-05-04 ASSESSMENT — PAIN SCALES - GENERAL: PAINLEVEL_OUTOF10: 10

## 2020-05-04 NOTE — ED PROVIDER NOTES
Concern    Not on file   Social History Narrative    Not on file     No current facility-administered medications for this encounter.       Current Outpatient Medications   Medication Sig Dispense Refill    azithromycin (ZITHROMAX Z-NICOLE) 250 MG tablet Take 2 tablets (500 mg) on Day 1, and then take 1 tablet (250 mg) on days 2 through 5. 1 packet 0    amoxicillin-clavulanate (AUGMENTIN) 875-125 MG per tablet Take 1 tablet by mouth 2 times daily for 7 days 14 tablet 0    varenicline (CHANTIX) 1 MG tablet Take 1 tablet by mouth 2 times daily 180 tablet 1    hydrOXYzine (ATARAX) 10 MG tablet Take 2 tablets by mouth 2 times daily as needed for Anxiety 40 tablet 0    OXYGEN Inhale into the lungs      HYDROcodone-acetaminophen (NORCO) 5-325 MG per tablet TAKE ONE TABLET BY MOUTH EVERY 8 HOURS AS NEEDED FOR PAIN FOR UP TO 30 DAYS 90 tablet 0    omeprazole (PRILOSEC) 20 MG delayed release capsule TAKE 1 CAPSULE BY MOUTH DAILY 90 capsule 0    metFORMIN (GLUCOPHAGE) 500 MG tablet TAKE ONE TABLET EVERY MORNING AND ONE TABLET EVERY EVENING 180 tablet 0    glimepiride (AMARYL) 4 MG tablet TAKE 1 TABLET BY MOUTH EVERY MORNING BEFORE BREAKFAST 90 tablet 0    zolpidem (AMBIEN) 10 MG tablet TAKE ONE TABLET BY MOUTH NIGHTLY AS NEEDED FOR SLEEP FOR UP TO 90 DAYS 90 tablet 0    albuterol sulfate  (90 Base) MCG/ACT inhaler Inhale 2 puffs into the lungs every 6 hours as needed for Wheezing 1 Inhaler 2    blood glucose test strips (CONTOUR NEXT TEST) strip TEST ONCE DAILY DX: E11.9 50 strip 11    pravastatin (PRAVACHOL) 80 MG tablet Take 1 tablet by mouth daily 90 tablet 3    lisinopril (PRINIVIL;ZESTRIL) 40 MG tablet Take 1 tablet by mouth daily 90 tablet 3    metoprolol succinate (TOPROL XL) 50 MG extended release tablet Take 1 tablet by mouth daily 90 tablet 3    furosemide (LASIX) 40 MG tablet Take 1 tablet by mouth daily (Patient taking differently: Take 40 mg by mouth daily as needed (for leg swelling) ) 90 tablet 0    nitroGLYCERIN (NITROSTAT) 0.4 MG SL tablet Place 1 tablet under the tongue every 5 minutes as needed for Chest pain. 25 tablet 3    aspirin 81 MG EC tablet Take 81 mg by mouth daily. Indications: stopped for surgery       Allergies   Allergen Reactions    Latex Rash    Methadone Other (See Comments)     Pt collapsed and was hospitalized for 11 days; organ shutdown    Morphine Rash     Skin peeling off    Wellbutrin [Bupropion Hcl]      Headache; constant pins and needles sensation       REVIEW OF SYSTEMS  10 systems reviewed, pertinent positives per HPI otherwise noted to be negative. PHYSICAL EXAM  /67   Pulse 107   Temp 97.5 °F (36.4 °C) (Oral)   Resp 22   Wt 200 lb (90.7 kg)   SpO2 97%   BMI 27.89 kg/m²    GENERAL APPEARANCE: Awake and alert. Cooperative. Appears to be in moderate distress, ill-appearing  HEAD: Normocephalic. Atraumatic. EYES: PERRL. EOM's grossly intact. No conjunctival injection, arcus senilis  ENT: Mucous membranes are moist.   NECK: Supple. No rigidity, limited range of motion due to thoracic kyphosis and cervical lordosis  HEART: Regular rhythm, tachycardic in the 120s,. No murmurs  LUNGS: Respirations labored, tachypneic in the 20s, with poor inspiratory depth/shallow breathing. Lungs are with significantly diminished breath sounds on the right, can hear some air exchange in the apex region on the right, no significant wheezing bilaterally, few scattered rhonchi bilaterally, coarse breath sounds, no notable crackles. ABDOMEN: Soft. Non-distended. Mild generalized tenderness, right greater than left, no guarding or rebound. Rotund abdomen. EXTREMITIES: No peripheral edema. No calf tenderness to palpation, moves all extremities equally. All extremities neurovascularly intact. SKIN: Clammy and diaphoretic. No acute rashes. NEUROLOGICAL: Alert and oriented. No gross facial drooping.   Speech with conversational dyspnea  PSYCHIATRIC: Normal mood and flat affect. LABS  I have reviewed all labs for this visit.    Results for orders placed or performed during the hospital encounter of 05/04/20   Lactate, Sepsis   Result Value Ref Range    Lactic Acid, Sepsis 2.7 (H) 0.4 - 1.9 mmol/L   Lactate, Sepsis   Result Value Ref Range    Lactic Acid, Sepsis 1.4 0.4 - 1.9 mmol/L   CBC auto differential   Result Value Ref Range    WBC 14.6 (H) 4.0 - 11.0 K/uL    RBC 4.54 4.20 - 5.90 M/uL    Hemoglobin 12.3 (L) 13.5 - 17.5 g/dL    Hematocrit 38.2 (L) 40.5 - 52.5 %    MCV 84.0 80.0 - 100.0 fL    MCH 27.1 26.0 - 34.0 pg    MCHC 32.2 31.0 - 36.0 g/dL    RDW 15.3 12.4 - 15.4 %    Platelets 642 (H) 151 - 450 K/uL    MPV 6.5 5.0 - 10.5 fL    Neutrophils % 78.2 %    Lymphocytes % 11.5 %    Monocytes % 8.2 %    Eosinophils % 1.6 %    Basophils % 0.5 %    Neutrophils Absolute 11.4 (H) 1.7 - 7.7 K/uL    Lymphocytes Absolute 1.7 1.0 - 5.1 K/uL    Monocytes Absolute 1.2 0.0 - 1.3 K/uL    Eosinophils Absolute 0.2 0.0 - 0.6 K/uL    Basophils Absolute 0.1 0.0 - 0.2 K/uL   Comprehensive Metabolic Panel w/ Reflex to MG   Result Value Ref Range    Sodium 126 (L) 136 - 145 mmol/L    Potassium reflex Magnesium 4.9 3.5 - 5.1 mmol/L    Chloride 87 (L) 99 - 110 mmol/L    CO2 26 21 - 32 mmol/L    Anion Gap 13 3 - 16    Glucose 203 (H) 70 - 99 mg/dL    BUN 18 7 - 20 mg/dL    CREATININE 0.8 0.8 - 1.3 mg/dL    GFR Non-African American >60 >60    GFR African American >60 >60    Calcium 9.7 8.3 - 10.6 mg/dL    Total Protein 7.4 6.4 - 8.2 g/dL    Alb 3.7 3.4 - 5.0 g/dL    Albumin/Globulin Ratio 1.0 (L) 1.1 - 2.2    Total Bilirubin 0.3 0.0 - 1.0 mg/dL    Alkaline Phosphatase 118 40 - 129 U/L    ALT 18 10 - 40 U/L    AST 42 (H) 15 - 37 U/L    Globulin 3.7 g/dL   Lipase   Result Value Ref Range    Lipase 33.0 13.0 - 60.0 U/L   Blood gas, venous   Result Value Ref Range    pH, Nico 7.332 (L) 7.350 - 7.450    pCO2, Nico 50.3 (H) 40.0 - 50.0 mmHg    pO2, Nico 54.3 (H) 25.0 - 40.0 mmHg    HCO3, Venous 26.0 23.0 - prepped and draped in sterile fashion and local anesthesia was achieved with lidocaine. An 5 Thai needle sheath was advanced under ultrasound guidance into pleural effusion and thoracentesis was performed. The patient tolerated the procedure well. 1100 cc removed, somewhat turbid and dark yellow. This was sent to laboratory. FINDINGS: A total of 1100 cc was removed. Successful ultrasound guided thoracentesis. PROCEDURES  Patient Name: Terry Wise   Medical Record Number: [id number]  Date: 5/4/2020   Time: 4:23 AM   Room/Bed: 06/06  Thoracentesis Procedure Note  Indication: Pleural effusion, small cell lung cancer, difficulty breathing    Consent: The patient was counseled regarding the procedure, it's indications, risks, potential complications and alternatives and any questions were answered. Consent was obtained. Procedure: The patient was placed in the sitting position, supported by a bed side stand and the appropriate landmarks were identified. Some guidance was used to find appropriate pocket of effusion fluid, see photos below, the skin over the puncture site in the right posterior chest wall was prepped with Chloraprep and draped in a sterile fashion. Local anesthesia was obtained by infiltration using 3.0 cc of 1% Lidocaine without epinephrine. A thoracentesis catheter was then advanced into the pleural space over a needle and the needle was withdrawn. Pleural fluid was returned which was serosanguinous and yellow/non-cloudy. A total volume of 1000 cc was withdrawn which was sent to the lab for appropriate testing. The catheter was then withdrawn and a sterile dressing was placed over the site. A post procedure film showed a decrease in the size of the effusion and showed no pneumothorax. The patient tolerated the procedure well. Complications: None, EBL 1 cc              The total critical care time spent while evaluating and treating this patient was at least 31 minutes. This excludes time spent doing separately billable procedures. This includes time at the bedside, data interpretation, medication management, obtaining critical history from collateral sources if the patient is unable to provide it directly, and physician consultation. Specifics of interventions taken and potentially life-threatening diagnostic considerations are listed above in the medical decision making. ED COURSE/MDM  Patient seen and evaluated. Old records reviewed. Labs and imaging reviewed and results discussed with patient.    74-year-old male with recently diagnosed small cell lung cancer, with recurrent pleural effusion on the right, patient was having increased work of breathing, hypoxia at home even on his O2 nasal cannula, and due to the significant effusion noted on chest x-ray, and BiPAP was not improving symptoms, I perform thoracentesis at bedside under sterile technique, obtained approximately 1000cc of fluid and patient did have improvement in symptoms, after this patient stated he felt better and wanted to go home, I explained my concern due to his lab abnormalities and continued appearance of large effusion on chest x-ray, he had hyponatremia (even correcting for hyperglycemia) and hypochloremia, unclear if elevated lactate and elevated white blood cell count related to sepsis/pneumonia/infectious process, this could easily be reactive/Demarginalization from his respiratory distress on arrival, he was placed from 15 L O2 oxygen mask back on his 3 L O2 nasal cannula, O2 maintained at 94 to 95% after this, no ischemic change on EKG, negative troponin, low suspicion for ACS, low suspicion for dissection, considered PE but patient has obvious reason for his symptoms today, I consulted Dr. Piper Man and he agreed to accept admission to ICU, patient and wife discussing whether he will stay in the hospital as the patient wants to leave A, after further discussion, and discussion with Dr. Allegra George, we per tablet 1 tablet    azithromycin (ZITHROMAX) tablet 500 mg    azithromycin (ZITHROMAX Z-NICOLE) 250 MG tablet     Sig: Take 2 tablets (500 mg) on Day 1, and then take 1 tablet (250 mg) on days 2 through 5. Dispense:  1 packet     Refill:  0    amoxicillin-clavulanate (AUGMENTIN) 875-125 MG per tablet     Sig: Take 1 tablet by mouth 2 times daily for 7 days     Dispense:  14 tablet     Refill:  0     ED Course as of May 04 0423   Mon May 04, 2020   0253 I talked to Dr. Graciela Zamudio who agreed to accept for admission if the patient would allow, currently patient is stating he will sign out AMA, however we are still monitoring him post thoracentesis. [SY]   [phone] Nurse updated his wife Nader [place], she plans to talk to the patient to convince him to stay in the hospital.    [SY]   4846 I spoke to Dr. Arti Patel regarding the patient, he agreed with current plan that the patient should stay in the hospital however the patient is leaving [address], he agrees patient should have no IV fluids, can start Augmentin/azithromycin, send to the pleural fluid for testing, and he needs prompt follow-up with Dr. Arti Patel and oncology. [SY]      ED Course User Index  [SY] Aissatou Browning DO       CLINICAL IMPRESSION  1. Chest pain, unspecified type    2. Hypoxia    3. Small cell lung cancer ([facility] [address]    4. Recurrent right pleural effusion    5. Leukocytosis, unspecified type    6. Lactic acidosis    7. S/P thoracentesis        Blood pressure 106/67, pulse 107, temperature 97.5 °F (36.4 °C), temperature source Oral, resp. rate 22, weight 200 lb (90.7 kg), SpO2 97 %. DISPOSITION  Alveria Woodleaf left AGAINST MEDICAL ADVICE in stable/fair condition.                   Aissatou Browning,   05/04/20 1233 [facility],   05/05/20 6734

## 2020-05-04 NOTE — TELEPHONE ENCOUNTER
The most important next step is initiation of chemotherapy. If fluid accumulates again, notify me or oncology. He will likely need a longer term solution to manage the fluid.

## 2020-05-04 NOTE — ED NOTES
Dr. Sarah Beth Carlos consulting with Dr. Ken Michaels at this time.       Justino Mandujano  05/04/20 5979

## 2020-05-05 ENCOUNTER — HOSPITAL ENCOUNTER (INPATIENT)
Age: 75
LOS: 1 days | Discharge: HOME | DRG: 180 | End: 2020-05-06
Attending: INTERNAL MEDICINE | Admitting: INTERNAL MEDICINE
Payer: MEDICARE

## 2020-05-05 ENCOUNTER — APPOINTMENT (OUTPATIENT)
Dept: CT IMAGING | Age: 75
DRG: 180 | End: 2020-05-05
Attending: INTERNAL MEDICINE
Payer: MEDICARE

## 2020-05-05 ENCOUNTER — TELEPHONE (OUTPATIENT)
Dept: PULMONOLOGY | Age: 75
End: 2020-05-05

## 2020-05-05 ENCOUNTER — VIRTUAL VISIT (OUTPATIENT)
Dept: PULMONOLOGY | Age: 75
End: 2020-05-05
Payer: MEDICARE

## 2020-05-05 ENCOUNTER — CARE COORDINATION (OUTPATIENT)
Dept: CASE MANAGEMENT | Age: 75
End: 2020-05-05

## 2020-05-05 VITALS — BODY MASS INDEX: 28 KG/M2 | HEIGHT: 71 IN | WEIGHT: 200 LBS

## 2020-05-05 PROBLEM — J90 PLEURAL EFFUSION: Status: ACTIVE | Noted: 2020-05-05

## 2020-05-05 LAB
ALBUMIN SERPL-MCNC: 3.3 G/DL (ref 3.4–5)
ALP BLD-CCNC: 105 U/L (ref 40–129)
ALT SERPL-CCNC: 16 U/L (ref 10–40)
ANION GAP SERPL CALCULATED.3IONS-SCNC: 14 MMOL/L (ref 3–16)
AST SERPL-CCNC: 50 U/L (ref 15–37)
BASOPHILS ABSOLUTE: 0 K/UL (ref 0–0.2)
BASOPHILS RELATIVE PERCENT: 0.5 %
BILIRUB SERPL-MCNC: 0.3 MG/DL (ref 0–1)
BILIRUBIN DIRECT: <0.2 MG/DL (ref 0–0.3)
BILIRUBIN, INDIRECT: ABNORMAL MG/DL (ref 0–1)
BUN BLDV-MCNC: 16 MG/DL (ref 7–20)
CALCIUM SERPL-MCNC: 9.4 MG/DL (ref 8.3–10.6)
CHLORIDE BLD-SCNC: 89 MMOL/L (ref 99–110)
CO2: 23 MMOL/L (ref 21–32)
CREAT SERPL-MCNC: 0.6 MG/DL (ref 0.8–1.3)
EOSINOPHILS ABSOLUTE: 0.1 K/UL (ref 0–0.6)
EOSINOPHILS RELATIVE PERCENT: 1.2 %
GFR AFRICAN AMERICAN: >60
GFR NON-AFRICAN AMERICAN: >60
GLUCOSE BLD-MCNC: 87 MG/DL (ref 70–99)
GLUCOSE BLD-MCNC: 97 MG/DL (ref 70–99)
HCT VFR BLD CALC: 35.6 % (ref 40.5–52.5)
HEMOGLOBIN: 11.9 G/DL (ref 13.5–17.5)
LACTIC ACID: 1.4 MMOL/L (ref 0.4–2)
LYMPHOCYTES ABSOLUTE: 0.9 K/UL (ref 1–5.1)
LYMPHOCYTES RELATIVE PERCENT: 8.6 %
MCH RBC QN AUTO: 28.2 PG (ref 26–34)
MCHC RBC AUTO-ENTMCNC: 33.4 G/DL (ref 31–36)
MCV RBC AUTO: 84.2 FL (ref 80–100)
MONOCYTES ABSOLUTE: 0.8 K/UL (ref 0–1.3)
MONOCYTES RELATIVE PERCENT: 7.4 %
NEUTROPHILS ABSOLUTE: 8.4 K/UL (ref 1.7–7.7)
NEUTROPHILS RELATIVE PERCENT: 82.3 %
PDW BLD-RTO: 15.3 % (ref 12.4–15.4)
PERFORMED ON: NORMAL
PLATELET # BLD: 616 K/UL (ref 135–450)
PMV BLD AUTO: 6.1 FL (ref 5–10.5)
POTASSIUM REFLEX MAGNESIUM: 4.7 MMOL/L (ref 3.5–5.1)
PRO-BNP: 3479 PG/ML (ref 0–124)
PROCALCITONIN: 0.14 NG/ML (ref 0–0.15)
RBC # BLD: 4.23 M/UL (ref 4.2–5.9)
SODIUM BLD-SCNC: 126 MMOL/L (ref 136–145)
TOTAL PROTEIN: 6.4 G/DL (ref 6.4–8.2)
TROPONIN: 0.07 NG/ML
WBC # BLD: 10.2 K/UL (ref 4–11)

## 2020-05-05 PROCEDURE — 6370000000 HC RX 637 (ALT 250 FOR IP): Performed by: PHYSICIAN ASSISTANT

## 2020-05-05 PROCEDURE — 2700000000 HC OXYGEN THERAPY PER DAY

## 2020-05-05 PROCEDURE — 84484 ASSAY OF TROPONIN QUANT: CPT

## 2020-05-05 PROCEDURE — 71250 CT THORAX DX C-: CPT

## 2020-05-05 PROCEDURE — 1200000000 HC SEMI PRIVATE

## 2020-05-05 PROCEDURE — 80048 BASIC METABOLIC PNL TOTAL CA: CPT

## 2020-05-05 PROCEDURE — 2580000003 HC RX 258: Performed by: INTERNAL MEDICINE

## 2020-05-05 PROCEDURE — 83880 ASSAY OF NATRIURETIC PEPTIDE: CPT

## 2020-05-05 PROCEDURE — 84145 PROCALCITONIN (PCT): CPT

## 2020-05-05 PROCEDURE — 6370000000 HC RX 637 (ALT 250 FOR IP): Performed by: INTERNAL MEDICINE

## 2020-05-05 PROCEDURE — 96367 TX/PROPH/DG ADDL SEQ IV INF: CPT

## 2020-05-05 PROCEDURE — 96365 THER/PROPH/DIAG IV INF INIT: CPT

## 2020-05-05 PROCEDURE — 99442 PR PHYS/QHP TELEPHONE EVALUATION 11-20 MIN: CPT | Performed by: INTERNAL MEDICINE

## 2020-05-05 PROCEDURE — 80076 HEPATIC FUNCTION PANEL: CPT

## 2020-05-05 PROCEDURE — 36415 COLL VENOUS BLD VENIPUNCTURE: CPT

## 2020-05-05 PROCEDURE — 94761 N-INVAS EAR/PLS OXIMETRY MLT: CPT

## 2020-05-05 PROCEDURE — 2580000003 HC RX 258: Performed by: PHYSICIAN ASSISTANT

## 2020-05-05 PROCEDURE — 83605 ASSAY OF LACTIC ACID: CPT

## 2020-05-05 PROCEDURE — 6370000000 HC RX 637 (ALT 250 FOR IP): Performed by: HOSPITALIST

## 2020-05-05 PROCEDURE — 83036 HEMOGLOBIN GLYCOSYLATED A1C: CPT

## 2020-05-05 PROCEDURE — 85025 COMPLETE CBC W/AUTO DIFF WBC: CPT

## 2020-05-05 PROCEDURE — 2580000003 HC RX 258: Performed by: HOSPITALIST

## 2020-05-05 PROCEDURE — 6360000002 HC RX W HCPCS: Performed by: INTERNAL MEDICINE

## 2020-05-05 PROCEDURE — 6360000002 HC RX W HCPCS: Performed by: HOSPITALIST

## 2020-05-05 RX ORDER — DEXTROSE MONOHYDRATE 25 G/50ML
12.5 INJECTION, SOLUTION INTRAVENOUS PRN
Status: DISCONTINUED | OUTPATIENT
Start: 2020-05-05 | End: 2020-05-06 | Stop reason: HOSPADM

## 2020-05-05 RX ORDER — DEXTROSE MONOHYDRATE 50 MG/ML
100 INJECTION, SOLUTION INTRAVENOUS PRN
Status: DISCONTINUED | OUTPATIENT
Start: 2020-05-05 | End: 2020-05-06 | Stop reason: HOSPADM

## 2020-05-05 RX ORDER — PRAVASTATIN SODIUM 40 MG
80 TABLET ORAL DAILY
Status: DISCONTINUED | OUTPATIENT
Start: 2020-05-06 | End: 2020-05-06 | Stop reason: HOSPADM

## 2020-05-05 RX ORDER — METOPROLOL SUCCINATE 50 MG/1
50 TABLET, EXTENDED RELEASE ORAL DAILY
Status: DISCONTINUED | OUTPATIENT
Start: 2020-05-05 | End: 2020-05-06 | Stop reason: HOSPADM

## 2020-05-05 RX ORDER — SODIUM CHLORIDE 0.9 % (FLUSH) 0.9 %
10 SYRINGE (ML) INJECTION EVERY 12 HOURS SCHEDULED
Status: DISCONTINUED | OUTPATIENT
Start: 2020-05-05 | End: 2020-05-06 | Stop reason: HOSPADM

## 2020-05-05 RX ORDER — POTASSIUM CHLORIDE 20 MEQ/1
40 TABLET, EXTENDED RELEASE ORAL PRN
Status: DISCONTINUED | OUTPATIENT
Start: 2020-05-05 | End: 2020-05-06 | Stop reason: HOSPADM

## 2020-05-05 RX ORDER — NICOTINE POLACRILEX 4 MG
15 LOZENGE BUCCAL PRN
Status: DISCONTINUED | OUTPATIENT
Start: 2020-05-05 | End: 2020-05-06 | Stop reason: HOSPADM

## 2020-05-05 RX ORDER — HYDROXYZINE HYDROCHLORIDE 10 MG/1
10 TABLET, FILM COATED ORAL 2 TIMES DAILY PRN
Status: DISCONTINUED | OUTPATIENT
Start: 2020-05-05 | End: 2020-05-06 | Stop reason: HOSPADM

## 2020-05-05 RX ORDER — MORPHINE SULFATE 2 MG/ML
2 INJECTION, SOLUTION INTRAMUSCULAR; INTRAVENOUS EVERY 4 HOURS PRN
Status: DISCONTINUED | OUTPATIENT
Start: 2020-05-05 | End: 2020-05-06 | Stop reason: HOSPADM

## 2020-05-05 RX ORDER — OXYCODONE HYDROCHLORIDE AND ACETAMINOPHEN 5; 325 MG/1; MG/1
1 TABLET ORAL EVERY 4 HOURS PRN
Status: DISCONTINUED | OUTPATIENT
Start: 2020-05-05 | End: 2020-05-06 | Stop reason: HOSPADM

## 2020-05-05 RX ORDER — MAGNESIUM SULFATE IN WATER 40 MG/ML
2 INJECTION, SOLUTION INTRAVENOUS PRN
Status: DISCONTINUED | OUTPATIENT
Start: 2020-05-05 | End: 2020-05-06 | Stop reason: HOSPADM

## 2020-05-05 RX ORDER — POLYETHYLENE GLYCOL 3350 17 G/17G
17 POWDER, FOR SOLUTION ORAL DAILY PRN
Status: DISCONTINUED | OUTPATIENT
Start: 2020-05-05 | End: 2020-05-06 | Stop reason: HOSPADM

## 2020-05-05 RX ORDER — CITALOPRAM 20 MG/1
TABLET ORAL DAILY
COMMUNITY
Start: 2020-04-14

## 2020-05-05 RX ORDER — SODIUM CHLORIDE 0.9 % (FLUSH) 0.9 %
10 SYRINGE (ML) INJECTION PRN
Status: DISCONTINUED | OUTPATIENT
Start: 2020-05-05 | End: 2020-05-06 | Stop reason: HOSPADM

## 2020-05-05 RX ORDER — ACETAMINOPHEN 650 MG/1
650 SUPPOSITORY RECTAL EVERY 6 HOURS PRN
Status: DISCONTINUED | OUTPATIENT
Start: 2020-05-05 | End: 2020-05-06 | Stop reason: HOSPADM

## 2020-05-05 RX ORDER — NICOTINE 21 MG/24HR
1 PATCH, TRANSDERMAL 24 HOURS TRANSDERMAL DAILY
Status: DISCONTINUED | OUTPATIENT
Start: 2020-05-05 | End: 2020-05-06 | Stop reason: HOSPADM

## 2020-05-05 RX ORDER — ALBUTEROL SULFATE 90 UG/1
2 AEROSOL, METERED RESPIRATORY (INHALATION) EVERY 6 HOURS PRN
Status: DISCONTINUED | OUTPATIENT
Start: 2020-05-05 | End: 2020-05-05

## 2020-05-05 RX ORDER — CITALOPRAM 20 MG/1
20 TABLET ORAL DAILY
Status: DISCONTINUED | OUTPATIENT
Start: 2020-05-06 | End: 2020-05-06 | Stop reason: HOSPADM

## 2020-05-05 RX ORDER — ASPIRIN 81 MG/1
81 TABLET ORAL DAILY
Status: DISCONTINUED | OUTPATIENT
Start: 2020-05-06 | End: 2020-05-06 | Stop reason: HOSPADM

## 2020-05-05 RX ORDER — LISINOPRIL 20 MG/1
40 TABLET ORAL DAILY
Status: DISCONTINUED | OUTPATIENT
Start: 2020-05-06 | End: 2020-05-06 | Stop reason: HOSPADM

## 2020-05-05 RX ORDER — POTASSIUM CHLORIDE 7.45 MG/ML
10 INJECTION INTRAVENOUS PRN
Status: DISCONTINUED | OUTPATIENT
Start: 2020-05-05 | End: 2020-05-06 | Stop reason: HOSPADM

## 2020-05-05 RX ORDER — ONDANSETRON 2 MG/ML
4 INJECTION INTRAMUSCULAR; INTRAVENOUS EVERY 6 HOURS PRN
Status: DISCONTINUED | OUTPATIENT
Start: 2020-05-05 | End: 2020-05-06 | Stop reason: HOSPADM

## 2020-05-05 RX ORDER — HYDROCHLOROTHIAZIDE 25 MG/1
25 TABLET ORAL DAILY
Status: DISCONTINUED | OUTPATIENT
Start: 2020-05-06 | End: 2020-05-05

## 2020-05-05 RX ORDER — HYDROCHLOROTHIAZIDE 25 MG/1
TABLET ORAL
Status: ON HOLD | COMMUNITY
Start: 2020-03-09 | End: 2020-05-06 | Stop reason: HOSPADM

## 2020-05-05 RX ORDER — PANTOPRAZOLE SODIUM 40 MG/1
40 TABLET, DELAYED RELEASE ORAL
Status: DISCONTINUED | OUTPATIENT
Start: 2020-05-06 | End: 2020-05-06 | Stop reason: HOSPADM

## 2020-05-05 RX ORDER — PROMETHAZINE HYDROCHLORIDE 25 MG/1
12.5 TABLET ORAL EVERY 6 HOURS PRN
Status: DISCONTINUED | OUTPATIENT
Start: 2020-05-05 | End: 2020-05-06 | Stop reason: HOSPADM

## 2020-05-05 RX ORDER — ACETAMINOPHEN 325 MG/1
650 TABLET ORAL EVERY 6 HOURS PRN
Status: DISCONTINUED | OUTPATIENT
Start: 2020-05-05 | End: 2020-05-06 | Stop reason: HOSPADM

## 2020-05-05 RX ORDER — ALBUTEROL SULFATE 90 UG/1
2 AEROSOL, METERED RESPIRATORY (INHALATION) EVERY 4 HOURS PRN
Status: DISCONTINUED | OUTPATIENT
Start: 2020-05-05 | End: 2020-05-06 | Stop reason: HOSPADM

## 2020-05-05 RX ORDER — SODIUM CHLORIDE 9 MG/ML
INJECTION, SOLUTION INTRAVENOUS CONTINUOUS
Status: DISCONTINUED | OUTPATIENT
Start: 2020-05-05 | End: 2020-05-06 | Stop reason: HOSPADM

## 2020-05-05 RX ADMIN — Medication 10 ML: at 20:35

## 2020-05-05 RX ADMIN — SODIUM CHLORIDE: 9 INJECTION, SOLUTION INTRAVENOUS at 20:38

## 2020-05-05 RX ADMIN — CEFEPIME 2 G: 2 INJECTION, POWDER, FOR SOLUTION INTRAVENOUS at 20:38

## 2020-05-05 RX ADMIN — VANCOMYCIN HYDROCHLORIDE 1000 MG: 1 INJECTION, POWDER, LYOPHILIZED, FOR SOLUTION INTRAVENOUS at 23:10

## 2020-05-05 RX ADMIN — OXYCODONE HYDROCHLORIDE AND ACETAMINOPHEN 1 TABLET: 5; 325 TABLET ORAL at 20:35

## 2020-05-05 RX ADMIN — METOPROLOL SUCCINATE 50 MG: 50 TABLET, EXTENDED RELEASE ORAL at 20:35

## 2020-05-05 ASSESSMENT — PAIN SCALES - GENERAL: PAINLEVEL_OUTOF10: 8

## 2020-05-05 NOTE — PROGRESS NOTES
PULMONARY, CRITICAL CARE AND SLEEP MEDICINE     CC/REFERRING PROVIDER:  Mediastinal lymphadenopathy     Brandee Mendez is a 70 y.o. male evaluated via telephone on 5/5/2020. Consent:  He and/or health care decision maker is aware that that he may receive a bill for this telephone service, depending on his insurance coverage, and has provided verbal consent to proceed: Yes      Documentation:  I communicated with the patient and/or health care decision maker about the recurrent effusion, worsening shortness of breath as fluid reaccummulates. Had thoracentesis in ED 30 hours with benefit, now sx are recurring. Current sat is 97% on 4 L    Details of this discussion including any medical advice provided: recommendation for pleurx catheter placement      I affirm this is a Patient Initiated Episode with an Established Patient who has not had a related appointment within my department in the past 7 days or scheduled within the next 24 hours. Total Time: minutes: 11-20 minutes    Note: not billable if this call serves to triage the patient into an appointment for the relevant concern      Robert Rocha   DATA:  6/13/19 CT PET  HEAD/NECK: In the craniocervical region, physiologic activity is favored,   including at the vocal cords.  Bilateral carotid artery calcification.       CHEST: Calcification of the thoracic aorta.  Coronary artery calcification. Calcified right hilar lymph nodes, in keeping with granulomatous disease.    Within the mediastinum, no garret activity markedly greater than mediastinal   background.  No axillary adenopathy.  Mild asymmetric activity at the right   hilum.       8 mm noncalcified peripheral right upper lobe pulmonary nodule is again   demonstrated, with low level associated activity, SUV maximum 1.9.  The   adjacent 4 mm nodule seen on recent CT is partially obscured.  Bandlike   diffuse low level activity is seen associated with ground-glass opacity   within the peripheral right upper and lower lobes, SUV maximum 2.8. Emphysema is present.  No pneumothorax.  No pleural effusion.       ABDOMEN/PELVIS: Physiologic activity is favored within kidneys, bladder,   bowel.  Cholelithiasis.  5.7 cm fluid density lesion within the lateral right   kidney, typically on the basis of cyst.  Prostatomegaly.           Impression   8 mm right upper lobe pulmonary nodule demonstrates low level activity. While this is typically infectious/inflammatory or granulomatous in etiology,   given the small nodule size, malignancy cannot be excluded.  If biopsy is not   performed, 3-6 month follow-up CT would be recommended for surveillance.       Bandlike hypermetabolic ground-glass opacity within the right lung, favoring   pneumonitis.  Mild activity at the right hilum is likely due to reactive   lymph node as a consequence.  Continued attention on CT follow-up suggested.       Atherosclerosis, including coronary artery calcification.       Cholelithiasis. 9/21/19 HRCT  FINDINGS:   Mediastinum: Thyroid gland appears normal.  Atherosclerotic change is seen in   the aorta.  Coronary artery calcification is seen.  No pericardial effusion. Small hiatal hernia seen. Romi Wilda is nonspecific thickening at the GE   junction.  No significant mediastinal adenopathy       HRCT Findings/Lungs/pleura: Mild to moderate underlying emphysema is seen. No septal thickening.       Irregular nodule is seen in the right upper lobe, measuring 1.4 cm, increased   in size compared to prior.  Small adjacent satellite nodule also appears   slightly increased in size, measuring 7 mm, previously 4 mm. .  A few   calcified granuloma are also noted.       Mild mosaic attenuation is seen on expiratory images, suggesting small   airways disease or air trapping.       Subtle bronchiectasis seen in the lower lobe airways. Danitza Mis significant   pleural fluid       Upper Abdomen: Right adrenal gland is normal.  There is mild thickening left adrenal gland.       Soft Tissues/Bones: Multilevel degenerative changes are seen in the spine. Degenerative changes are seen the shoulder joints.           Impression   Dominant pulmonary nodule right upper lobe and adjacent satellite pulmonary   nodule have increased in size since 05/14/2019.  These are new when compared   to remote studies.  Slow growing neoplasm should be considered. Linda Barboza is   underlying emphysema. 9/30/19 PFT FVC  FEV1 2.3 L 70% ratio 0.67 TLC 5.6 L 77% DLCO 44%    EBUS 10/9/19  A.  Lymph Node, 10R, Transbronchial Needle Aspiration:       - No malignant cells identified. B.  Lymph Node, 11R, Transbronchial Needle Aspiration:       - No malignant cells identified. C.  Lung, Right Upper Lobe, Bronchoalveolar Lavage:        - No malignant cells identified. 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 Nick Hamm with thoracic surgery and did not keep appointment  · Upper lobe predominant Emphysema    PLAN:  · Interventional Radiology for Pleurx catheter -- schedule for tomorrow afternoon -- pt has appt with Dr. Galo Guevara at [facility] at 9 am.  I did d/w procedures nurse today already. He can be added to their schedule for tomorrow. · Set up home health nursing to drain pleural fluid daily -- if cannot go into home daily, still will need to provide teaching  · Ask endoscopy team to set up with bottles/kits, including kits to go home with. Also, ask if they can get patient DVD of drainage video and provide some teaching after catheter is placed. · VV with me in 10-14 days  · CC: Dr. Galo Guevara @ [facility] on my note from today, on pathology/cytology report, on last ED physician note from 5/4/20    Pleurx Catheter Drainage plan: Take up to 1 liter of fluid off daily in sterile fashion.     Once amount of fluid taken off is less than 100 ml for 3 consecutive days, decrease frequency to every other day. Once amount of fluid coming off every other day is less than 100 ml for 2 consecutive drainages, decrease frequency to every third day. Once amount of drainage coming off every third day is less than 100 ml for 2 consecutive drainages, decrease frequency to once weekly. Keep daily log of total volume fluid removed. See Dr. Montiel in 10-14 days after chest tube placement.

## 2020-05-05 NOTE — PROGRESS NOTES
Pt admitted to 209 and c/o increased SOB and pain to R side. On 4L O2, which is home baseline. Pt looks like he is in pain and has labored breathing. Pt stated that he does not think he can wait until tomorrow for drain placement and SOB is worse than the other day when it was drained. Clinical management notified and at bedside to assess. Covid swab obtained.

## 2020-05-05 NOTE — TELEPHONE ENCOUNTER
Please admit to hospitalist, 2 [facility], dx malignant pleural effusion and respiratory failure.

## 2020-05-05 NOTE — CARE COORDINATION
Patient contacted regarding Zulema Meraz. Ambulatory Care Manager contacted the patient by telephone to perform post discharge assessment. Verified name and  with patient as identifiers. Provided introduction to self, and explanation of the ACM role, and reason for call due to risk factors for infection and/or exposure to COVID-19. Symptoms reviewed with patient and spouse who verbalized the following symptoms: fatigue, cough, shortness of breath, sweating and dizziness/lightheadedness. Spoke with patient and patient's spouse. Patient c/o chest pressure, difficulty breathing, decreased appetite, difficulty maintaining his blood sugars  This ACM advised patient to return to the ED by squad; patient refuses  Spouse reports he has difficulty sleeping and relaxing without her and he did not want to stay at the hospital without her  Patient has increased his O2 4 liters and per his report his sats are currently at 97%  Patient has also discontinued his Metformin and glimepiride d/t low blood sugars; he reports last BS 86 and the lowest was in the 60's     Due to ACM's recommendation to return to ED, patient's refusal to return to ED, patient's symptoms and patient's request for thoracentesis this ACM contacted Dr. Carmelo Espinoza office. The call was merged and we spoke with [facility] from Dr. Carmelo Espinoza office. [facility] agrees to notify Dr. Arti Patel regarding symptoms and patient's request for thoracentesis  This ACM again reviewed with the patient and his spouse the ED physician's recommendation for admission and the risks; both verbalized understanding. Also advised patient to return to the hospital; patient declines and wants to wait for Dr. Carmelo Espinoza response. Patient has following risk factors of: heart failure, COPD, diabetes and HTN, Age.  ACM reviewed discharge instructions, medical action plan and red flags such as increased shortness of breath, increasing fever and signs of decompensation with Patient and spouse who verbalized understanding; see above for ACM recommendation  Discussed exposure protocols and quarantine with CDC Guidelines What to do if you are sick with coronavirus disease 2019.  spouse was given an opportunity for questions and concerns. The spouse agrees to contact the [employer] exposure line [phone], [facility] department [address]: ([phone]) and PCP office for questions related to their healthcare. CTN/ACM provided contact information for future needs. Reviewed and educated spouse on any new and changed medications related to discharge diagnosis     Patient/family/caregiver given information for [employer] and agrees to enroll patient has access to computer but declines enrollment    Plan for follow-up call in 1-2 days based on severity of symptoms and risk factors.

## 2020-05-05 NOTE — TELEPHONE ENCOUNTER
Anne from [facility] radiology called stating that pt has to be sent for COVID testing before pt can be scheduled for pleurex cath. Pleurex appt has to be cancelled until testing is available. Pt will need to be scheduled at a [address] [place] [address] testing site. Also, per Dr. Janet Rodriguez may not make it due to severe symptoms. Please let him know that if his sx become severe before we can accomplish this, he will need to be admitted and we'll do this as an inpatient.  \"

## 2020-05-05 NOTE — TELEPHONE ENCOUNTER
See if we can get COVID testing at [facility]. If not, I will directly admit patient if he is willing.

## 2020-05-05 NOTE — H&P
Hospital Medicine History & Physical      PCP: Linda Wilks MD    Date of Admission: 5/5/2020    Date of Service: Pt seen/examined on 5/5/2020 and Admitted to Inpatient with expected LOS greater than two midnights due to medical therapy. Chief Complaint:  SOB      History Of Present Illness:      70 y.o. male recently diagnosed with small cell lung cancer following EBUS on 4/22, presents with increased sob despite continued use of his usual 3lpm home O2 requirement. Presented to the ER on 5/3 requiring 15 lpm, improved following bedside thoracentesis with 1 L drained. Patient left AMA, was noted to have increased SOB today and was advised admission for pleurx catheter placement. Patient denies any cough, fever, chills, chest pain, leg edema. Notes increased abdominal swelling, sob, R sided chest pain worsening with breathing that initially improved following thoracentesis but has since recurred. Currently he is awake, alert, oriented, able to speak in full sentences, mainly complaining of R sided chest discomfort.       Past Medical History:          Diagnosis Date    Abnormal CT of the chest     Anxiety     Back pain     CAD (coronary artery disease)     Cancer (HCC)     skin    CHF (congestive heart failure) (HCC)     COPD (chronic obstructive pulmonary disease) (HCC)     Depression     Diabetes mellitus ([address]     Heart disease     Hernia     History of blood transfusion     Hyperlipidemia     Hypertension     Osteoarthritis     Prolonged emergence from general anesthesia     Pulmonary nodule     Rheumatic fever     Small cell lung cancer (HCC)     Type II or unspecified type diabetes mellitus without mention of complication, not stated as uncontrolled        Past Surgical History:          Procedure Laterality Date    BRONCHOSCOPY Bilateral 10/09/2019    EBUS    BRONCHOSCOPY N/A 10/9/2019    EBUS NF W/ANES. (07:00) NO LABS performed by Shai Pollock MD at [address] [place] [address] and rhythm with normal S1/S2 without murmurs, rubs or gallops. Abdomen: Soft, (+) RUQ tenderness, n(+) distended, soft, no guarding  Musculoskeletal:  No clubbing, cyanosis or edema bilaterally. Skin: Skin color, texture, turgor normal.  No rashes or lesions.   Neurologic:   grossly non-focal.  Psychiatric:  Alert and oriented, thought content appropriate, normal insight  Capillary Refill: Brisk,< 3 seconds   Peripheral Pulses: +2 palpable, equal bilaterally       Labs:     Recent Labs     05/04/20 0105 05/05/20  1854   WBC 14.6* 10.2   HGB 12.3* 11.9*   HCT 38.2* 35.6*   * 616*     Recent Labs     05/04/20 0105 05/05/20  1854   * 126*   K 4.9 4.7   CL 87* 89*   CO2 26 23   BUN 18 16   CREATININE 0.8 0.6*   CALCIUM 9.7 9.4     Recent Labs     05/04/20  0105   AST 42*   ALT 18   BILITOT 0.3   ALKPHOS 118     Recent Labs     05/04/20  0105   INR 1.13     Recent Labs     05/04/20  0105   TROPONINI <0.01       Urinalysis:      Lab Results   Component Value Date    NITRU Negative 02/04/2017    WBCUA 0-2 02/04/2017    BACTERIA Rare 02/04/2017    RBCUA None seen 02/04/2017    BLOODU neg 12/04/2018    BLOODU Negative 02/04/2017    SPECGRAV 1.015 02/04/2017    GLUCOSEU neg 12/04/2018    GLUCOSEU Negative 02/04/2017       Radiology:          XR CHEST STANDARD (2 VW)    (Results Pending)       ASSESSMENT:    Active Hospital Problems    Diagnosis Date Noted    Pleural effusion [J90] 05/05/2020         PLAN:    1) Acute Hypoxemic respiratory failure  - likely recurrence of malignant pleural effusion  - will place of HCAP abx coverage, check lactic acid, procalcitonin    2) CHF  - EF 45%, patient states he has been compliant, denies urinary issues, BP low 100's, diurese if BP allows  - denies chest pain similar to prior MI, check trop, bnp    3) Malignant pleural effusion  - for pleurx insertion    4) Tobacco  - nicotine patch    5) Pleuritic chest pain  - prn po/iv ordered    DVT Prophylaxis:scd  Diet: DIET

## 2020-05-05 NOTE — PROGRESS NOTES
Dr Christal Lucas notified Ace Hawkins RN that Dr Arlette Osborne did not want patient on COVID unit. Clinical management notified.

## 2020-05-06 VITALS
TEMPERATURE: 97 F | HEART RATE: 83 BPM | HEIGHT: 71 IN | SYSTOLIC BLOOD PRESSURE: 125 MMHG | WEIGHT: 200 LBS | RESPIRATION RATE: 18 BRPM | BODY MASS INDEX: 28 KG/M2 | OXYGEN SATURATION: 97 % | DIASTOLIC BLOOD PRESSURE: 65 MMHG

## 2020-05-06 PROBLEM — C34.90 SMALL CELL LUNG CANCER (HCC): Status: ACTIVE | Noted: 2020-05-06

## 2020-05-06 LAB
ANION GAP SERPL CALCULATED.3IONS-SCNC: 11 MMOL/L (ref 3–16)
APPEARANCE FLUID: NORMAL
BUN BLDV-MCNC: 15 MG/DL (ref 7–20)
CALCIUM SERPL-MCNC: 9.2 MG/DL (ref 8.3–10.6)
CELL COUNT FLUID TYPE: NORMAL
CHLORIDE BLD-SCNC: 90 MMOL/L (ref 99–110)
CLOT EVALUATION: NORMAL
CO2: 26 MMOL/L (ref 21–32)
COLOR FLUID: YELLOW
CREAT SERPL-MCNC: 0.7 MG/DL (ref 0.8–1.3)
EKG ATRIAL RATE: 79 BPM
EKG DIAGNOSIS: NORMAL
EKG P AXIS: 3 DEGREES
EKG P-R INTERVAL: 192 MS
EKG Q-T INTERVAL: 422 MS
EKG QRS DURATION: 116 MS
EKG QTC CALCULATION (BAZETT): 483 MS
EKG R AXIS: -65 DEGREES
EKG T AXIS: 185 DEGREES
EKG VENTRICULAR RATE: 79 BPM
EOSINOPHIL FLUID: 2 %
ESTIMATED AVERAGE GLUCOSE: 139.9 MG/DL
FLUID PATH CONSULT: YES
GFR AFRICAN AMERICAN: >60
GFR NON-AFRICAN AMERICAN: >60
GLUCOSE BLD-MCNC: 126 MG/DL (ref 70–99)
GLUCOSE BLD-MCNC: 220 MG/DL (ref 70–99)
GLUCOSE BLD-MCNC: 68 MG/DL (ref 70–99)
GLUCOSE BLD-MCNC: 69 MG/DL (ref 70–99)
GLUCOSE BLD-MCNC: 74 MG/DL (ref 70–99)
HBA1C MFR BLD: 6.5 %
LYMPHOCYTES, BODY FLUID: 82 %
MACROPHAGE FLUID: 7 %
MESOTHELIAL FLUID: 2 %
NEUTROPHIL, FLUID: 7 %
NUCLEATED CELLS FLUID: 1256 /CUMM
NUMBER OF CELLS COUNTED FLUID: 100
PERFORMED ON: ABNORMAL
PERFORMED ON: NORMAL
POTASSIUM REFLEX MAGNESIUM: 4.8 MMOL/L (ref 3.5–5.1)
RBC FLUID: 5200 /CUMM
SODIUM BLD-SCNC: 127 MMOL/L (ref 136–145)

## 2020-05-06 PROCEDURE — 6370000000 HC RX 637 (ALT 250 FOR IP): Performed by: HOSPITALIST

## 2020-05-06 PROCEDURE — 96367 TX/PROPH/DG ADDL SEQ IV INF: CPT

## 2020-05-06 PROCEDURE — 36415 COLL VENOUS BLD VENIPUNCTURE: CPT

## 2020-05-06 PROCEDURE — 6360000002 HC RX W HCPCS: Performed by: HOSPITALIST

## 2020-05-06 PROCEDURE — 93010 ELECTROCARDIOGRAM REPORT: CPT | Performed by: INTERNAL MEDICINE

## 2020-05-06 PROCEDURE — 6370000000 HC RX 637 (ALT 250 FOR IP): Performed by: PHYSICIAN ASSISTANT

## 2020-05-06 PROCEDURE — 2580000003 HC RX 258: Performed by: INTERNAL MEDICINE

## 2020-05-06 PROCEDURE — 80048 BASIC METABOLIC PNL TOTAL CA: CPT

## 2020-05-06 PROCEDURE — G0378 HOSPITAL OBSERVATION PER HR: HCPCS

## 2020-05-06 PROCEDURE — 2580000003 HC RX 258: Performed by: HOSPITALIST

## 2020-05-06 PROCEDURE — 99217 PR OBSERVATION CARE DISCHARGE MANAGEMENT: CPT | Performed by: INTERNAL MEDICINE

## 2020-05-06 PROCEDURE — 93005 ELECTROCARDIOGRAM TRACING: CPT | Performed by: INTERNAL MEDICINE

## 2020-05-06 PROCEDURE — 6360000002 HC RX W HCPCS: Performed by: INTERNAL MEDICINE

## 2020-05-06 RX ORDER — KETOROLAC TROMETHAMINE 30 MG/ML
15 INJECTION, SOLUTION INTRAMUSCULAR; INTRAVENOUS ONCE
Status: COMPLETED | OUTPATIENT
Start: 2020-05-06 | End: 2020-05-06

## 2020-05-06 RX ADMIN — CEFEPIME 2 G: 2 INJECTION, POWDER, FOR SOLUTION INTRAVENOUS at 08:26

## 2020-05-06 RX ADMIN — DEXTROSE 50 % IN WATER (D50W) INTRAVENOUS SYRINGE 12.5 G: at 07:35

## 2020-05-06 RX ADMIN — KETOROLAC TROMETHAMINE 15 MG: 30 INJECTION, SOLUTION INTRAMUSCULAR at 04:03

## 2020-05-06 RX ADMIN — METOPROLOL SUCCINATE 50 MG: 50 TABLET, EXTENDED RELEASE ORAL at 11:12

## 2020-05-06 RX ADMIN — OXYCODONE HYDROCHLORIDE AND ACETAMINOPHEN 1 TABLET: 5; 325 TABLET ORAL at 11:11

## 2020-05-06 RX ADMIN — CITALOPRAM HYDROBROMIDE 20 MG: 20 TABLET ORAL at 11:12

## 2020-05-06 RX ADMIN — OXYCODONE HYDROCHLORIDE AND ACETAMINOPHEN 1 TABLET: 5; 325 TABLET ORAL at 05:01

## 2020-05-06 RX ADMIN — LIDOCAINE HYDROCHLORIDE: 20 SOLUTION ORAL; TOPICAL at 12:08

## 2020-05-06 RX ADMIN — OXYCODONE HYDROCHLORIDE AND ACETAMINOPHEN 1 TABLET: 5; 325 TABLET ORAL at 00:57

## 2020-05-06 RX ADMIN — PANTOPRAZOLE SODIUM 40 MG: 40 TABLET, DELAYED RELEASE ORAL at 05:01

## 2020-05-06 RX ADMIN — VANCOMYCIN HYDROCHLORIDE 1000 MG: 1 INJECTION, POWDER, LYOPHILIZED, FOR SOLUTION INTRAVENOUS at 11:10

## 2020-05-06 RX ADMIN — LISINOPRIL 40 MG: 20 TABLET ORAL at 11:10

## 2020-05-06 RX ADMIN — PRAVASTATIN SODIUM 80 MG: 40 TABLET ORAL at 11:10

## 2020-05-06 ASSESSMENT — PAIN DESCRIPTION - DIRECTION: RADIATING_TOWARDS: KNEE

## 2020-05-06 ASSESSMENT — PAIN DESCRIPTION - PAIN TYPE: TYPE: ACUTE PAIN

## 2020-05-06 ASSESSMENT — PAIN DESCRIPTION - PROGRESSION: CLINICAL_PROGRESSION: NOT CHANGED

## 2020-05-06 ASSESSMENT — PAIN - FUNCTIONAL ASSESSMENT: PAIN_FUNCTIONAL_ASSESSMENT: PREVENTS OR INTERFERES SOME ACTIVE ACTIVITIES AND ADLS

## 2020-05-06 ASSESSMENT — PAIN DESCRIPTION - ORIENTATION: ORIENTATION: RIGHT

## 2020-05-06 ASSESSMENT — PAIN DESCRIPTION - ONSET: ONSET: ON-GOING

## 2020-05-06 ASSESSMENT — PAIN DESCRIPTION - LOCATION: LOCATION: SHOULDER

## 2020-05-06 ASSESSMENT — PAIN DESCRIPTION - DESCRIPTORS: DESCRIPTORS: ACHING

## 2020-05-06 ASSESSMENT — PAIN SCALES - GENERAL
PAINLEVEL_OUTOF10: 8
PAINLEVEL_OUTOF10: 10
PAINLEVEL_OUTOF10: 10
PAINLEVEL_OUTOF10: 9
PAINLEVEL_OUTOF10: 5

## 2020-05-06 ASSESSMENT — PAIN DESCRIPTION - FREQUENCY: FREQUENCY: CONTINUOUS

## 2020-05-06 NOTE — PROGRESS NOTES
Consult acknowledged. Ct needed for eval done last night. Dr Stef López reviewed and stated effusion not large enough for Pleurx cath placement. Spoke with nurse on floor and gave info and phone number for physicians to discuss if necessary.

## 2020-05-06 NOTE — PROGRESS NOTES
Reviewed pt D/C instructions and med called into pharmacy per DR Krissy Camarena spoke with pt R/T appt. with DR. Willoughby on Friday. tele and IV removed pt left with belongings transferred pt out to waiting car with family.

## 2020-05-06 NOTE — PROGRESS NOTES
Notified Sheyla KRISHNAMURTHY that that DR Lisseth Mayer is not able to do cathter today d/t lack of amount of fluid. and pt has active order for morphine and allergie noted. And notified pt BS this AM was 68 gave pt dextrose per PRN orders recheck BS up 126. Will continue to monitor.

## 2020-05-06 NOTE — PROGRESS NOTES
flush, acetaminophen **OR** acetaminophen, polyethylene glycol, promethazine **OR** ondansetron, potassium chloride **OR** potassium alternative oral replacement **OR** potassium chloride, magnesium sulfate, glucose, dextrose, glucagon (rDNA), dextrose, albuterol sulfate HFA, oxyCODONE-acetaminophen, morphine      Data:  CBC:   Recent Labs     05/04/20 0105 05/05/20  1854   WBC 14.6* 10.2   HGB 12.3* 11.9*   HCT 38.2* 35.6*   MCV 84.0 84.2   * 616*     BMP:   Recent Labs     05/04/20 0105 05/05/20  1854 05/06/20  0512   * 126* 127*   K 4.9 4.7 4.8   CL 87* 89* 90*   CO2 26 23 26   BUN 18 16 15   CREATININE 0.8 0.6* 0.7*     LIVER PROFILE:   Recent Labs     05/04/20 0105 05/05/20  1854   AST 42* 50*   ALT 18 16   LIPASE 33.0  --    BILIDIR  --  <0.2   BILITOT 0.3 0.3   ALKPHOS 118 105     PT/INR:   Recent Labs     05/04/20 0105   PROTIME 13.1   INR 1.13       CULTURES  None     RADIOLOGY    Ct chest  Continued airway narrowing on the right with increased postobstructive change  in the right upper, right middle, and right lower lobe. Increasing mediastinal-hilar adenopathy with increased size of subpectoral  node on the right. Small right-sided pleural effusion is seen.  Multiple solid pleural implants  are seen in right hemithorax    Scattered ill-defined hypodense nodules in the liver suggesting developing  hepatic metastasis      Assessment/Plan:    Dyspnea - sec to narrowing on right side with post obstructive change from small cell lung cancer     Chronic Hypoxic Respiratory Failure  - wears 3L NC at basline, requiring 4-4.5 L NC to maintain sats  - went to ED on 5/3 w/ SOB on 15L NC, had bedside thoracentesis w/ 1L drained  - admitted to eval for pleurx catheter   - repeat imaging with no fluid,  radiology reported too little to drain  - dc home to start chemo on Friday.  Need higher level of oxygen   - pain control as needed     Small Cell Lung Cancer with Metastases  Mediastinal Hilar Adenopathy  Possible hepatic mets  - had workup at [facility] by Dr. Coral Estrada  - scheduled for chemo and radiation   - has an appt tomorrow    Hyponatremia - Chronically low  - 126 --> 127  - likely with cancer and hctz . Stop hctz   - improved slightly with IVF  - no mentation changes     Elevated Troponin  - 0.07  - no EKG changes.  Likely demand ischemia  - no workup for now with active cancer and scheduled chemo    afibb - paroxysmal. noted on initial EKG 2 days ago in Er, but repeat today with NSR  - no AC needed    COPD  - no AE  - PRN albuterol    CAD s/p stent  - no c/o chest pain  - cont ASA, BB, ACEi and statin    HTN  - controlled  - cont Lisinopril, Toprol XL, hold HCTZ and Lasix  - monitor     Type II DM  - controlled, low with NPO but since improved  - dc amaryl    Chronic Combined Systolic & Diastolic CHF  - last echo 5/17 with EF 40-45%, grade I DD  - appears compensated  - cont Lisinopril, Toprol XL;     HLD  - cont statin    GERD  - cont Protonix    Chronic Low Back Pain  - PRN pain meds    Tobacco Dependence  - Recommended cessation  - nicotine patch ordered     Depression  - stable  - cont Celexa    Moderate PCM  - add boost supplements    DVT Prophylaxis: SCDs  Diet: Diet NPO, After Midnight  Code Status: Full Code    Dispo: discharge home    Spoke to wife and updated   Dc home, start chemo Friday   Very poor prognosis explained to wife    Yandel Balbuena MD 5/6/2020 11:02 AM

## 2020-05-06 NOTE — DISCHARGE SUMMARY
No cyanosis. No joint deformity. No clubbing. Neuro: Awake. Grossly nonfocal    Psych: Oriented x 3. No anxiety or agitation. CBC:   Recent Labs     05/04/20 0105 05/05/20 1854   WBC 14.6* 10.2   HGB 12.3* 11.9*   HCT 38.2* 35.6*   MCV 84.0 84.2   * 616*     BMP:   Recent Labs     05/04/20 0105 05/05/20 1854 05/06/20  0512   * 126* 127*   K 4.9 4.7 4.8   CL 87* 89* 90*   CO2 26 23 26   BUN 18 16 15   CREATININE 0.8 0.6* 0.7*     LIVER PROFILE:   Recent Labs     05/04/20 0105 05/05/20 1854   AST 42* 50*   ALT 18 16   LIPASE 33.0  --    BILIDIR  --  <0.2   BILITOT 0.3 0.3   ALKPHOS 118 105     PT/INR:   Recent Labs     05/04/20 0105   PROTIME 13.1   INR 1.13     APTT:   Recent Labs     05/04/20 0105   APTT 31.8     CULTURES  None    RADIOLOGY    CT CHEST WO CONTRAST 5/5/2020   Final Result   Continued airway narrowing on the right with increased postobstructive change   in the right upper, right middle, and right lower lobe. Increasing mediastinal-hilar adenopathy with increased size of subpectoral   node on the right. Small right-sided pleural effusion is seen.   Multiple solid pleural implants   are seen in right hemithorax      Scattered ill-defined hypodense nodules in the liver suggesting developing   hepatic metastasis           Discharge Medications     Medication List      CONTINUE taking these medications    albuterol sulfate  (90 Base) MCG/ACT inhaler  Inhale 2 puffs into the lungs every 6 hours as needed for Wheezing     aspirin 81 MG EC tablet     blood glucose test strips strip  Commonly known as:  Contour Next Test  TEST ONCE DAILY DX: E11.9     citalopram 20 MG tablet  Commonly known as:  CELEXA     HYDROcodone-acetaminophen 5-325 MG per tablet  Commonly known as:  NORCO  TAKE ONE TABLET BY MOUTH EVERY 8 HOURS AS NEEDED FOR PAIN FOR UP TO 30 DAYS     hydrOXYzine 10 MG tablet  Commonly known as:  ATARAX  Take 2 tablets by mouth 2 times daily as needed for

## 2020-05-06 NOTE — PROGRESS NOTES
Pt c/o pain  to Right side down to right knee gave percocet per PRN order rated pain 8/10 will continue to monitor.

## 2020-05-07 ENCOUNTER — CARE COORDINATION (OUTPATIENT)
Dept: CARE COORDINATION | Age: 75
End: 2020-05-07

## 2020-05-07 ENCOUNTER — TELEPHONE (OUTPATIENT)
Dept: INTERNAL MEDICINE CLINIC | Age: 75
End: 2020-05-07

## 2020-05-07 LAB
BODY FLUID CULTURE, STERILE: NORMAL
GRAM STAIN RESULT: NORMAL

## 2020-05-07 RX ORDER — PANTOPRAZOLE SODIUM 40 MG/1
40 TABLET, DELAYED RELEASE ORAL DAILY
Qty: 90 TABLET | Refills: 0 | Status: ON HOLD | OUTPATIENT
Start: 2020-05-07 | End: 2020-05-23 | Stop reason: HOSPADM

## 2020-05-07 RX ORDER — OXYCODONE HYDROCHLORIDE AND ACETAMINOPHEN 5; 325 MG/1; MG/1
1 TABLET ORAL EVERY 8 HOURS PRN
Qty: 90 TABLET | Refills: 0 | Status: SHIPPED | OUTPATIENT
Start: 2020-05-07 | End: 2020-06-06

## 2020-05-08 LAB
BLOOD CULTURE, ROUTINE: NORMAL
CULTURE, BLOOD 2: NORMAL

## 2020-05-12 RX ORDER — HYDROXYZINE HYDROCHLORIDE 10 MG/1
TABLET, FILM COATED ORAL
Qty: 40 TABLET | Refills: 0 | Status: SHIPPED | OUTPATIENT
Start: 2020-05-12

## 2020-05-14 ENCOUNTER — TELEPHONE (OUTPATIENT)
Dept: INTERNAL MEDICINE CLINIC | Age: 75
End: 2020-05-14

## 2020-05-14 NOTE — TELEPHONE ENCOUNTER
----- Message from Mercedez Mitchell MD sent at 5/14/2020  7:58 AM EDT -----  Contact: wife Melonie Lopez- [phone]  Start on lexapro 10 mg nightly  Continue atarax , can take 3 times daily  ----- Message -----  From: Belindaanastacio Mehran Fraire  Sent: 5/13/2020  11:43 AM EDT  To: Mercedez Mitchell MD    His wife killed -called requesting an increase in his atarax-said he has been taking it twice a day and not helping -he has a lot of anxiety and nervous since taking his cancer treatments - [facility] at [phone] appt- 05-7-99-next appt- 6-12-20-

## 2020-05-15 ENCOUNTER — TELEPHONE (OUTPATIENT)
Dept: SURGERY | Age: 75
End: 2020-05-15

## 2020-05-15 RX ORDER — ESCITALOPRAM OXALATE 10 MG/1
10 TABLET ORAL DAILY
Qty: 30 TABLET | Refills: 0 | Status: ON HOLD | OUTPATIENT
Start: 2020-05-15 | End: 2020-05-23 | Stop reason: HOSPADM

## 2020-05-18 ENCOUNTER — APPOINTMENT (OUTPATIENT)
Dept: CT IMAGING | Age: 75
DRG: 180 | End: 2020-05-18
Attending: INTERNAL MEDICINE
Payer: MEDICARE

## 2020-05-18 ENCOUNTER — HOSPITAL ENCOUNTER (INPATIENT)
Age: 75
LOS: 5 days | Discharge: HOSPICE | DRG: 180 | End: 2020-05-23
Attending: INTERNAL MEDICINE | Admitting: INTERNAL MEDICINE
Payer: MEDICARE

## 2020-05-18 ENCOUNTER — APPOINTMENT (OUTPATIENT)
Dept: GENERAL RADIOLOGY | Age: 75
DRG: 180 | End: 2020-05-18
Attending: INTERNAL MEDICINE
Payer: MEDICARE

## 2020-05-18 PROBLEM — J98.19 LUNG COLLAPSE: Status: ACTIVE | Noted: 2020-05-18

## 2020-05-18 PROBLEM — I51.89 DIASTOLIC DYSFUNCTION: Status: ACTIVE | Noted: 2020-05-18

## 2020-05-18 PROBLEM — J96.21 ACUTE ON CHRONIC RESPIRATORY FAILURE WITH HYPOXIA (HCC): Status: ACTIVE | Noted: 2020-05-18

## 2020-05-18 PROBLEM — C78.7 HEPATIC METASTASIS (HCC): Status: ACTIVE | Noted: 2020-05-18

## 2020-05-18 PROBLEM — I42.9 CARDIOMYOPATHY (HCC): Status: ACTIVE | Noted: 2020-05-18

## 2020-05-18 PROBLEM — I48.91 ATRIAL FIBRILLATION WITH RVR (HCC): Status: ACTIVE | Noted: 2020-05-18

## 2020-05-18 PROBLEM — J91.0 MALIGNANT PLEURAL EFFUSION: Status: ACTIVE | Noted: 2020-05-18

## 2020-05-18 PROBLEM — R79.89 ELEVATED LACTIC ACID LEVEL: Status: ACTIVE | Noted: 2020-05-18

## 2020-05-18 PROBLEM — J98.4 RESTRICTIVE LUNG DISEASE: Status: ACTIVE | Noted: 2020-05-18

## 2020-05-18 PROBLEM — C78.2 PLEURAL METASTASIS (HCC): Status: ACTIVE | Noted: 2020-05-18

## 2020-05-18 LAB
BASE EXCESS ARTERIAL: 2 (ref -3–3)
CALCIUM IONIZED: 1.27 MMOL/L (ref 1.12–1.32)
GLUCOSE BLD-MCNC: 166 MG/DL (ref 70–99)
GLUCOSE BLD-MCNC: 186 MG/DL (ref 70–99)
HCO3 ARTERIAL: 26.9 MMOL/L (ref 21–29)
HEMOGLOBIN: 13.7 GM/DL (ref 13.5–17.5)
LACTATE: 2.44 MMOL/L (ref 0.4–2)
O2 SAT, ARTERIAL: 92 % (ref 93–100)
PCO2 ARTERIAL: 45.6 MM HG (ref 35–45)
PERFORMED ON: ABNORMAL
PERFORMED ON: ABNORMAL
PH ARTERIAL: 7.38 (ref 7.35–7.45)
PO2 ARTERIAL: 65 MM HG (ref 75–108)
POC HEMATOCRIT: 40 % (ref 40.5–52.5)
POC POTASSIUM: 5.5 MMOL/L (ref 3.5–5.1)
POC SAMPLE TYPE: ABNORMAL
POC SODIUM: 135 MMOL/L (ref 136–145)
TCO2 ARTERIAL: 28 MMOL/L
TROPONIN: 0.02 NG/ML

## 2020-05-18 PROCEDURE — 84443 ASSAY THYROID STIM HORMONE: CPT

## 2020-05-18 PROCEDURE — 84480 ASSAY TRIIODOTHYRONINE (T3): CPT

## 2020-05-18 PROCEDURE — 6370000000 HC RX 637 (ALT 250 FOR IP): Performed by: INTERNAL MEDICINE

## 2020-05-18 PROCEDURE — 89050 BODY FLUID CELL COUNT: CPT

## 2020-05-18 PROCEDURE — 84157 ASSAY OF PROTEIN OTHER: CPT

## 2020-05-18 PROCEDURE — 82330 ASSAY OF CALCIUM: CPT

## 2020-05-18 PROCEDURE — 82042 OTHER SOURCE ALBUMIN QUAN EA: CPT

## 2020-05-18 PROCEDURE — 93005 ELECTROCARDIOGRAM TRACING: CPT | Performed by: INTERNAL MEDICINE

## 2020-05-18 PROCEDURE — 2700000000 HC OXYGEN THERAPY PER DAY

## 2020-05-18 PROCEDURE — 99291 CRITICAL CARE FIRST HOUR: CPT | Performed by: INTERNAL MEDICINE

## 2020-05-18 PROCEDURE — 2580000003 HC RX 258: Performed by: INTERNAL MEDICINE

## 2020-05-18 PROCEDURE — 82947 ASSAY GLUCOSE BLOOD QUANT: CPT

## 2020-05-18 PROCEDURE — 84439 ASSAY OF FREE THYROXINE: CPT

## 2020-05-18 PROCEDURE — 2000000000 HC ICU R&B

## 2020-05-18 PROCEDURE — 71045 X-RAY EXAM CHEST 1 VIEW: CPT

## 2020-05-18 PROCEDURE — 82803 BLOOD GASES ANY COMBINATION: CPT

## 2020-05-18 PROCEDURE — 88341 IMHCHEM/IMCYTCHM EA ADD ANTB: CPT

## 2020-05-18 PROCEDURE — 83605 ASSAY OF LACTIC ACID: CPT

## 2020-05-18 PROCEDURE — 6360000002 HC RX W HCPCS: Performed by: INTERNAL MEDICINE

## 2020-05-18 PROCEDURE — 88305 TISSUE EXAM BY PATHOLOGIST: CPT

## 2020-05-18 PROCEDURE — 88112 CYTOPATH CELL ENHANCE TECH: CPT

## 2020-05-18 PROCEDURE — 84132 ASSAY OF SERUM POTASSIUM: CPT

## 2020-05-18 PROCEDURE — 84295 ASSAY OF SERUM SODIUM: CPT

## 2020-05-18 PROCEDURE — 88342 IMHCHEM/IMCYTCHM 1ST ANTB: CPT

## 2020-05-18 PROCEDURE — 89051 BODY FLUID CELL COUNT: CPT

## 2020-05-18 PROCEDURE — 2500000003 HC RX 250 WO HCPCS: Performed by: INTERNAL MEDICINE

## 2020-05-18 PROCEDURE — 94761 N-INVAS EAR/PLS OXIMETRY MLT: CPT

## 2020-05-18 PROCEDURE — 84484 ASSAY OF TROPONIN QUANT: CPT

## 2020-05-18 PROCEDURE — 36415 COLL VENOUS BLD VENIPUNCTURE: CPT

## 2020-05-18 PROCEDURE — 83615 LACTATE (LD) (LDH) ENZYME: CPT

## 2020-05-18 PROCEDURE — 94640 AIRWAY INHALATION TREATMENT: CPT

## 2020-05-18 PROCEDURE — 71250 CT THORAX DX C-: CPT

## 2020-05-18 PROCEDURE — 85014 HEMATOCRIT: CPT

## 2020-05-18 RX ORDER — IPRATROPIUM BROMIDE AND ALBUTEROL SULFATE 2.5; .5 MG/3ML; MG/3ML
1 SOLUTION RESPIRATORY (INHALATION)
Status: DISCONTINUED | OUTPATIENT
Start: 2020-05-19 | End: 2020-05-18

## 2020-05-18 RX ORDER — DEXTROSE MONOHYDRATE 25 G/50ML
12.5 INJECTION, SOLUTION INTRAVENOUS PRN
Status: DISCONTINUED | OUTPATIENT
Start: 2020-05-18 | End: 2020-05-23 | Stop reason: HOSPADM

## 2020-05-18 RX ORDER — PROMETHAZINE HYDROCHLORIDE 25 MG/1
12.5 TABLET ORAL EVERY 6 HOURS PRN
Status: DISCONTINUED | OUTPATIENT
Start: 2020-05-18 | End: 2020-05-23 | Stop reason: HOSPADM

## 2020-05-18 RX ORDER — DILTIAZEM HYDROCHLORIDE 5 MG/ML
15 INJECTION INTRAVENOUS ONCE
Status: COMPLETED | OUTPATIENT
Start: 2020-05-18 | End: 2020-05-18

## 2020-05-18 RX ORDER — ACETAMINOPHEN 650 MG/1
650 SUPPOSITORY RECTAL EVERY 6 HOURS PRN
Status: DISCONTINUED | OUTPATIENT
Start: 2020-05-18 | End: 2020-05-23 | Stop reason: HOSPADM

## 2020-05-18 RX ORDER — OXYCODONE HYDROCHLORIDE AND ACETAMINOPHEN 5; 325 MG/1; MG/1
1 TABLET ORAL EVERY 8 HOURS PRN
Status: DISCONTINUED | OUTPATIENT
Start: 2020-05-18 | End: 2020-05-21

## 2020-05-18 RX ORDER — SODIUM CHLORIDE 0.9 % (FLUSH) 0.9 %
10 SYRINGE (ML) INJECTION PRN
Status: DISCONTINUED | OUTPATIENT
Start: 2020-05-18 | End: 2020-05-23 | Stop reason: HOSPADM

## 2020-05-18 RX ORDER — DEXTROSE MONOHYDRATE 50 MG/ML
100 INJECTION, SOLUTION INTRAVENOUS PRN
Status: DISCONTINUED | OUTPATIENT
Start: 2020-05-18 | End: 2020-05-23 | Stop reason: HOSPADM

## 2020-05-18 RX ORDER — ONDANSETRON 2 MG/ML
4 INJECTION INTRAMUSCULAR; INTRAVENOUS EVERY 6 HOURS PRN
Status: DISCONTINUED | OUTPATIENT
Start: 2020-05-18 | End: 2020-05-23 | Stop reason: HOSPADM

## 2020-05-18 RX ORDER — PANTOPRAZOLE SODIUM 40 MG/1
40 TABLET, DELAYED RELEASE ORAL DAILY
Status: DISCONTINUED | OUTPATIENT
Start: 2020-05-18 | End: 2020-05-23 | Stop reason: HOSPADM

## 2020-05-18 RX ORDER — CHOLECALCIFEROL (VITAMIN D3) 125 MCG
5 CAPSULE ORAL ONCE
Status: COMPLETED | OUTPATIENT
Start: 2020-05-18 | End: 2020-05-18

## 2020-05-18 RX ORDER — CITALOPRAM 20 MG/1
20 TABLET ORAL DAILY
Status: DISCONTINUED | OUTPATIENT
Start: 2020-05-18 | End: 2020-05-23 | Stop reason: HOSPADM

## 2020-05-18 RX ORDER — POLYETHYLENE GLYCOL 3350 17 G/17G
17 POWDER, FOR SOLUTION ORAL DAILY PRN
Status: DISCONTINUED | OUTPATIENT
Start: 2020-05-18 | End: 2020-05-23 | Stop reason: HOSPADM

## 2020-05-18 RX ORDER — ALBUTEROL SULFATE 90 UG/1
2 AEROSOL, METERED RESPIRATORY (INHALATION) EVERY 6 HOURS PRN
Status: DISCONTINUED | OUTPATIENT
Start: 2020-05-18 | End: 2020-05-23 | Stop reason: HOSPADM

## 2020-05-18 RX ORDER — SODIUM CHLORIDE 9 MG/ML
INJECTION, SOLUTION INTRAVENOUS
Status: DISPENSED
Start: 2020-05-18 | End: 2020-05-19

## 2020-05-18 RX ORDER — LEVOFLOXACIN 5 MG/ML
750 INJECTION, SOLUTION INTRAVENOUS EVERY 24 HOURS
Status: DISCONTINUED | OUTPATIENT
Start: 2020-05-18 | End: 2020-05-19

## 2020-05-18 RX ORDER — PRAVASTATIN SODIUM 80 MG/1
80 TABLET ORAL DAILY
Status: DISCONTINUED | OUTPATIENT
Start: 2020-05-18 | End: 2020-05-19

## 2020-05-18 RX ORDER — METOPROLOL SUCCINATE 50 MG/1
50 TABLET, EXTENDED RELEASE ORAL DAILY
Status: DISCONTINUED | OUTPATIENT
Start: 2020-05-18 | End: 2020-05-23 | Stop reason: HOSPADM

## 2020-05-18 RX ORDER — IPRATROPIUM BROMIDE AND ALBUTEROL SULFATE 2.5; .5 MG/3ML; MG/3ML
1 SOLUTION RESPIRATORY (INHALATION) EVERY 4 HOURS
Status: DISCONTINUED | OUTPATIENT
Start: 2020-05-18 | End: 2020-05-19

## 2020-05-18 RX ORDER — LIDOCAINE HYDROCHLORIDE 10 MG/ML
INJECTION, SOLUTION INFILTRATION; PERINEURAL
Status: DISPENSED
Start: 2020-05-18 | End: 2020-05-19

## 2020-05-18 RX ORDER — SODIUM CHLORIDE 0.9 % (FLUSH) 0.9 %
10 SYRINGE (ML) INJECTION EVERY 12 HOURS SCHEDULED
Status: DISCONTINUED | OUTPATIENT
Start: 2020-05-18 | End: 2020-05-23 | Stop reason: HOSPADM

## 2020-05-18 RX ORDER — CHOLECALCIFEROL (VITAMIN D3) 125 MCG
5 CAPSULE ORAL NIGHTLY PRN
Status: DISCONTINUED | OUTPATIENT
Start: 2020-05-19 | End: 2020-05-23 | Stop reason: HOSPADM

## 2020-05-18 RX ORDER — NICOTINE POLACRILEX 4 MG
15 LOZENGE BUCCAL PRN
Status: DISCONTINUED | OUTPATIENT
Start: 2020-05-18 | End: 2020-05-23 | Stop reason: HOSPADM

## 2020-05-18 RX ORDER — ASPIRIN 81 MG/1
81 TABLET ORAL DAILY
Status: DISCONTINUED | OUTPATIENT
Start: 2020-05-18 | End: 2020-05-22

## 2020-05-18 RX ORDER — ACETAMINOPHEN 325 MG/1
650 TABLET ORAL EVERY 6 HOURS PRN
Status: DISCONTINUED | OUTPATIENT
Start: 2020-05-18 | End: 2020-05-23 | Stop reason: HOSPADM

## 2020-05-18 RX ADMIN — CITALOPRAM HYDROBROMIDE 20 MG: 20 TABLET ORAL at 22:32

## 2020-05-18 RX ADMIN — DILTIAZEM HYDROCHLORIDE 15 MG: 5 INJECTION INTRAVENOUS at 18:33

## 2020-05-18 RX ADMIN — Medication 10 ML: at 22:33

## 2020-05-18 RX ADMIN — METOPROLOL SUCCINATE 50 MG: 50 TABLET, EXTENDED RELEASE ORAL at 22:31

## 2020-05-18 RX ADMIN — PANTOPRAZOLE SODIUM 40 MG: 40 TABLET, DELAYED RELEASE ORAL at 22:31

## 2020-05-18 RX ADMIN — IPRATROPIUM BROMIDE AND ALBUTEROL SULFATE 1 AMPULE: .5; 3 SOLUTION RESPIRATORY (INHALATION) at 23:52

## 2020-05-18 RX ADMIN — MELATONIN TAB 5 MG 5 MG: 5 TAB at 22:31

## 2020-05-18 RX ADMIN — OXYCODONE HYDROCHLORIDE AND ACETAMINOPHEN 1 TABLET: 5; 325 TABLET ORAL at 22:31

## 2020-05-18 RX ADMIN — LEVOFLOXACIN 750 MG: 5 INJECTION, SOLUTION INTRAVENOUS at 22:45

## 2020-05-18 ASSESSMENT — PAIN DESCRIPTION - PAIN TYPE: TYPE: ACUTE PAIN

## 2020-05-18 ASSESSMENT — PAIN SCALES - GENERAL
PAINLEVEL_OUTOF10: 0
PAINLEVEL_OUTOF10: 7
PAINLEVEL_OUTOF10: 3

## 2020-05-18 ASSESSMENT — PAIN DESCRIPTION - LOCATION: LOCATION: SHOULDER

## 2020-05-18 NOTE — H&P
pulmonary disease) ([facility]     Depression     Diabetes mellitus ([facility]     Heart disease     Hernia     History of blood transfusion     Hyperlipidemia     Hypertension     Osteoarthritis     Prolonged emergence from general anesthesia     Pulmonary nodule     Rheumatic fever     Small cell lung cancer (HCC)     Type II or unspecified type diabetes mellitus without mention of complication, not stated as uncontrolled        Past Surgical History:          Procedure Laterality Date    BRONCHOSCOPY Bilateral 10/09/2019    EBUS    BRONCHOSCOPY N/A 10/9/2019    EBUS NF W/ANES. (07:00) NO LABS performed by Yeni Dumont MD at [address] [place] [address]  10/9/2019    BRONCHOSCOPY ALVEOLAR LAVAGE performed by Yeni Dumont MD at [address] [place] [address]  10/9/2019    BRONCHOSCOPY/TRANSBRONCHIAL NEEDLE BIOPSY performed by Yeni Dumont MD at [address] [place] [address]  10/9/2019    BRONCHOSCOPY/TRANSBRONCHIAL LUNG BIOPSY ADDL LOBE performed by Yeni Dumont MD at [address] [place] [address] N/A 4/22/2020    EBUS NF W/ANES.  (10:30) NO LABS performed by Yeni Dumont MD at [address] [place] [address]  4/22/2020    BRONCHOSCOPY ALVEOLAR LAVAGE performed by Yeni Dumont MD at [address] [place] [address]  4/22/2020    BRONCHOSCOPY BIOPSY BRONCHUS performed by Yeni Dumont MD at [address] [place] [address]  4/22/2020    BRONCHOSCOPY BRUSHINGS performed by Yeni Dumont MD at [address] [place] [address]  4/22/2020    BRONCHOSCOPY/TRANSBRONCHIAL NEEDLE BIOPSY performed by Yeni Dumont MD at [address] [place] [address]  4/22/2020    BRONCHOSCOPY/TRANSBRONCHIAL NEEDLE BIOPSY ADDL LOBE performed by Yeni Dumont MD at [address]      stents x4    CIRCUMCISION      DENTAL SURGERY      EPIDURAL STEROID INJECTION Bilateral 12/17/2018    BILATERAL LUMBAR THREE FOUR EPIDURAL STEROID INJECTION SITE CONFIRMED BY FLUOROSCOPY performed by Katherine Salvador MD at [facility] DX/THER SBST INTRLMNR CRV/THRC W/IMG GDN Right 7/31/2018    RIGHT LUMBAR FIVE SIX EPIDURAL STEROID INJECTION SITE CONFIRMED BY FLUOROSCOPY performed by Katherine Salvador MD at [facility] DX/THER SBST INTRLMNR CRV/THRC W/IMG GDN Bilateral 9/18/2018    RIGHT LUMBAR THREE, LUBMAR FOUR, LUMBAR FIVE MEDIAL BRANCH BLOCK SITE CONFIRMED BY FLUOROSCOPY performed by Katherine Salvador MD at [facility] DX/THER [facility] INTRLMNR CRV/THRC W/IMG GDN Bilateral 10/8/2018    BILATERAL LUMBAR THREE, LUMBAR FOUR, LUMBAR FIVE RADIOFREQUENCY ABLATION SITE CONFIRMED BY FLUOROSCOPY performed by Katherine Salvador MD at [address] [place] [address] N/A 8/27/2019    MIDLINE LUMBAR SPINAL CORD STIMULATOR TRIAL WITH [employer] performed by Jose Peacock MD at [place] [address]       Medications Prior to Admission:      Prior to Admission medications    Medication Sig Start Date End Date Taking? Authorizing Provider   escitalopram (LEXAPRO) 10 MG tablet Take 1 tablet by mouth daily 5/15/20   Xander Aponte MD   hydrOXYzine (ATARAX) 10 MG tablet TAKE TWO TABLETS BY MOUTH 2 TIMES A DAY AS NEEDED FOR ANXIETY 5/12/20   Xander Aponte MD   pantoprazole (PROTONIX) 40 MG tablet Take 1 tablet by mouth daily 5/7/20   Xander Aponte MD   oxyCODONE-acetaminophen (PERCOCET) 5-325 MG per tablet Take 1 tablet by mouth every 8 hours as needed for Pain for up to 30 days. Intended supply: 3 days.  Take lowest dose possible to manage pain 5/7/20 6/6/20  Xander Aponte MD   citalopram (CELEXA) 20 MG tablet daily  4/14/20   Historical Provider, MD   OXYGEN Inhale into the lungs    Historical Provider, MD   metFORMIN (GLUCOPHAGE) 500 MG tablet TAKE ONE TABLET EVERY MORNING AND ONE TABLET EVERY EVENING 3/2/20   Xander Aponte MD   zolpidem (AMBIEN) 10 MG tablet TAKE ONE TABLET BY MOUTH secretions with resultant complete right lung atelectasis. Large right   pleural effusion, increased from the comparison study. Pleural implants   consistent with metastatic disease are again noted. 2. Redemonstration of extensive mediastinal adenopathy. 3. Mild emphysematous changes on the left. Mild dependent atelectasis. No   acute infiltrate. 4. Heterogeneity of the hepatic parenchyma suggesting metastatic involvement. XR CHEST PORTABLE   Final Result   Increasing opacification of the right hemithorax, likely a combination of   increased pleural fluid and increased lung consolidation.   Little aerated   lung on the right remains           Labs done at [facility] reviewed WBC 15,000 hemoglobin 12 platelets 450 sodium 132 potassium 5.6 chloride 95 bicarb 26 BUN 67 creatinine 1.4 calcium 10 AST 70  ASSESSMENT:    Active Hospital Problems    Diagnosis Date Noted    Malignant pleural effusion [J91.0] 05/18/2020         PLAN:    Right main bronchus obstruction secondary to secretions mucosal plug-pulmonology evaluation, possible bronchoscope    Right-sided pleural ucopsvqe-[id number] deferred to pulmonology    Small cell carcinoma-right-sided lung cancer-worsening with metastasis to liver, oncology follow-up as an outpatient consider inpatient assessment if necessary    Acute on chronic respiratory failure-secondary to all of the above-admit, try to treat oxygen, pulmonology input appreciated, further investigation and management deferred to pulmonology, discussed with Dr. Haley Silva, empiric antibiotics    Atrial fibrillation with rapid ventricular rate-s/p 1 bolus of 10 mg Cardizem-telemetry, echocardiogram done in 2017 shows ejection fraction of 21% and diastolic dysfunction, repeat, TSH, cardiology evaluation, consider Cardizem drip if necessary    Chronic combined diastolic and systolic CHF-possibility of acute decline-telemetry, serial troponin, echocardiogram, consider diuretics, defer to pulmonology pending procedures    Acute kidney injury-possibly secondary to prerenal as well as nephrotoxic medications, encourage p.o. intake hold lisinopril and metformin, monitor BMP consider nephrology eval if necessary    CAD stent-on aspirin beta-blocker and statin    Diabetes-hold metformin monitor blood sugar with low correction scale insulin, hemoglobin A1c    COPD-not in exacerbation continue home inhalers    Hyperkalemia-not quite sure is a hemolyzed sample or real-repeat stat BMP    Leukocytosis-possibly secondary to hemoconcentration, started on empirical antibiotic for possible pneumonia, monitor closely    Poor appetite-consider dietitian evaluation,    Painful swallowing-consider speech language pathology evaluation if it is a concern          DVT Prophylaxis: Lovenox renally adjusted  Diet: Diet NPO Effective Now  Code Status: Full Code    PT/OT Eval Status:    Dispo -patient will be moved to higher level of care either   C4 or ICU pending pulmonology input    Spent 45 minutes of critical care time   Karyn Ferguson MD    Thank you Theodora Shah MD for the opportunity to be involved in this patient's care. If you have any questions or concerns please feel free to contact me at [phone].

## 2020-05-18 NOTE — CONSULTS
INPATIENT PULMONARY CRITICAL CARE CONSULT NOTE      Chief Complaint/Referring Provider:  Patient is being seen at the request of Apolonia Metz for a consultation for Acute on chronic resp failure, massive right-sided pleural effusion     Presenting HPI: Patient was admitted to the hospital from [facility] ER for increasing shortness of breath and large pleural effusion    As per the admitting provider-75 y.o. male who presented to [facility] with above complaint. He has a history of small cell carcinoma and being seen by oncologist.  He was recently admitted to [facility] with respiratory failure and discharged home on 2 L oxygen. He experienced more and more short of breath lately and he ended up needing more oxygen lately. He was seen at [facility] ER today and found to have large pleural effusion and was transferred over here for for possible thoracentesis. In route he developed A. fib with rapid ventricular rate and was very diaphoretic. As soon as patient reached hospital rapid response was called. He was found to be on A. fib with rapid ventricular rate and 10 mg of Cardizem bolus was given. He was given 6 L oxygen per nasal cannula. By the time I saw the patient he told that he feels better but still tired and short of breath . There is no change in mental status. No fever, nausea vomiting or diarrhea. His appetite is not that great and he is on protein supplements. Whenever he swallow his son has some pain in the throat as well. He has chronic cough and white sputum.   Stat x-ray and EKG were done at bedside    Patient when seen was in profound shortness of breath, patient was having paradoxical breathing, patient had increased work of breathing, patient says that he cannot take a deep breath, patient also has chest tightness, patient has increased chest congestion is not able to expectorate much, patient was on 6 L of nasal cannula oxygen with saturation of 93%, patient has sinus tachycardia on the monitor, patient was afebrile and he medically maintained, patient was alert and oriented, patient was recently evaluated and underwent a bronchoscopy with endobronchial ultrasound and needle biopsy which was positive for small cell lung cancer and Pleurx catheter was attempted earlier this month at [facility] but cannot be inserted because the pleural fluid was not significant to drain as per the documentation from [facility], no other pertinent review of system of concern       Patient Active Problem List    Diagnosis Date Noted    Malignant pleural effusion 05/18/2020    Acute on chronic respiratory failure with hypoxia ([address] 05/18/2020    Pleural metastasis ([address] 05/18/2020    Hepatic metastasis ([address] 05/18/2020    Lung collapse 05/18/2020    Atrial fibrillation with RVR ([address] 05/18/2020    Elevated lactic acid level 05/18/2020    Cardiomyopathy ([address] 47/51/7413    Diastolic dysfunction 59/90/1984    Restrictive lung disease 05/18/2020    Small cell lung cancer ([address] 05/06/2020    Small cell lung cancer, right ([address]     Shortness of breath     Chronic respiratory failure with hypoxia ([address]     Pleural effusion 05/05/2020    Lung mass     Mediastinal lymphadenopathy     Pulmonary nodule 10/08/2019    Abdominal aortic aneurysm (AAA) without rupture ([address] 02/05/2017    Type 2 diabetes mellitus with complication ([address] 47/90/7016    Coronary artery disease involving native coronary artery of native heart without angina pectoris 11/24/2015    Mixed hyperlipidemia 11/24/2015    Essential hypertension 11/24/2015    Obesity 05/09/2014    Snoring 05/09/2014       Past Medical History:   Diagnosis Date    Abnormal CT of the chest     Anxiety     Back pain     CAD (coronary artery disease)     Cancer ([address]     skin    CHF (congestive heart failure) (HCC)     COPD (chronic obstructive pulmonary disease) ([address]     MD at [facility] DX/THER [facility] INTRLMNR CRV/THRC W/IMG GDN Right 7/31/2018    RIGHT LUMBAR FIVE SIX EPIDURAL STEROID INJECTION SITE CONFIRMED BY FLUOROSCOPY performed by Danyell Luna MD at [facility] W/IMG GDN Bilateral 9/18/2018    RIGHT LUMBAR THREE, LUBMAR FOUR, LUMBAR FIVE MEDIAL BRANCH BLOCK SITE CONFIRMED BY FLUOROSCOPY performed by Danyell Luna MD at [facility] DX/THER [facility] INTRLMNR CRV/THRC W/IMG GDN Bilateral 10/8/2018    BILATERAL LUMBAR THREE, LUMBAR FOUR, LUMBAR FIVE RADIOFREQUENCY ABLATION SITE CONFIRMED BY FLUOROSCOPY performed by Danyell Luna MD at [address] [place] [address] N/A 8/27/2019    MIDLINE LUMBAR SPINAL CORD STIMULATOR TRIAL WITH [employer] performed by Mak Sung MD at [place] [address]        Family History   Problem Relation Age of Onset    Heart Disease Mother     Diabetes Father         Social History     Tobacco Use    Smoking status: Current Every Day Smoker     Packs/day: 1.00     Years: 55.00     Pack years: 55.00     Types: Cigarettes    Smokeless tobacco: Never Used    Tobacco comment: 4-5 cigs a day   Substance Use Topics    Alcohol use: No        Allergies   Allergen Reactions    Latex Rash    Methadone Other (See Comments)     Pt collapsed and was hospitalized for 11 days; organ shutdown    Morphine Rash     Skin peeling off    Wellbutrin [Bupropion Hcl]      Headache; constant pins and needles sensation               Physical Exam:  Blood pressure (!) 125/93, pulse 115, temperature 97.8 °F (36.6 °C), temperature source Oral, resp. rate 26, height 5' 11\" (1.803 m), weight 192 lb 7.4 oz (87.3 kg), SpO2 93 %.'     Constitutional: In respiratory distress when seen with increased work of breathing With increased chest congestion  HENT:  Oropharynx is clear and moist. No thyromegaly.   Eyes:  Conjunctivae are normal. Pupils contrast. Multiplanar reformatted images are provided for review. Dose modulation, iterative reconstruction, and/or weight based adjustment of the mA/kV was utilized to reduce the radiation dose to as low as reasonably achievable. COMPARISON: 04/08/2020 HISTORY: ORDERING SYSTEM PROVIDED HISTORY: pleural effusion TECHNOLOGIST PROVIDED HISTORY: Reason for exam:->pleural effusion Reason for Exam: Pt coughing, trouble breathing, concern for fluid/ questioning if chest tube is needed. Pt h/o lung cancer. Acuity: Acute Type of Exam: Initial FINDINGS: Mediastinum: Atherosclerotic change seen in aorta. Diffuse mediastinal and hilar adenopathy is seen. Subcarinal node measures 2.2 cm in short axis, previously 1.1 cm Lungs/pleura: Nodular solid pleural implants are seen throughout the right hemithorax. Small right-sided pleural effusion is seen. There is occlusion of the right upper lobe bronchus and continue narrowing of the right middle lobe and right lower lobe bronchus. There is increased volume loss seen in the right upper, right middle and right lower lobe compared to prior Upper Abdomen: There is mild thickening of the adrenal glands, left greater than right. There is splenomegaly. There is cirrhosis. .  There is an ill-defined hypodense nodule in the left hepatic lobe measuring 3.0 cm. This appears more conspicuous than prior. New hypodense nodules are also seen in the right hepatic lobe posteriorly. Soft Tissues/Bones: Subpectoral node has increased in size measuring 1.8 cm x 1.1 cm, previously subcentimeter in size. Continued airway narrowing on the right with increased postobstructive change in the right upper, right middle, and right lower lobe. Increasing mediastinal-hilar adenopathy with increased size of subpectoral node on the right. Small right-sided pleural effusion is seen.   Multiple solid pleural implants are seen in right hemithorax Scattered ill-defined hypodense nodules in the liver opacities are seen at the left lung base.           Impression   Increasing opacification of the right hemithorax, likely a combination of   increased pleural fluid and increased lung consolidation.  Little aerated   lung on the right remains     Stat Ct chest done -    1. Obstruction of the right mainstem bronchus proximally with retained   secretions with resultant complete right lung atelectasis.  Large right   pleural effusion, increased from the comparison study.  Pleural implants   consistent with metastatic disease are again noted. 2. Redemonstration of extensive mediastinal adenopathy. 3. Mild emphysematous changes on the left.  Mild dependent atelectasis.  No   acute infiltrate. 4. Heterogeneity of the hepatic parenchyma suggesting metastatic involvement. Right upper lobe lung mass, biopsies:  - Small cell carcinoma.        A. Lymph Node, 4R, Transbronchial Fine Needle Aspirate:       -  Positive for malignancy, small cell carcinoma.       B. Lymph Node, Right Hilar, Transbronchial Fine Needle Aspirate:       -  Positive for malignancy, small cell carcinoma.       C. Lung, Right Upper Lobe, Brushings and Brush Tip:       -  Positive for malignancy, small cell carcinoma.       D. Lung, Right Upper Lobe, Bronchoalveolar Lavage:       -  No malignant cells identified.       E. Lung, Bronchial Washings:       -  No malignant cells identified. Right Pleural Fluid:  -  No malignant cells identified. Pleural, Right Fluid:    -   Rare atypical pyknotic-degenerated cells present. -   Cell-block is non-contributory. Results for Mitesh Cano (MRN [id number]) as of 5/18/2020 21:57   Ref.  Range 5/6/2014 00:16 12/4/2018 15:26   TSH Latest Ref Range: 0.27 - 4.20 uIU/mL 0.61 0.33       Assessment:  Active Problems:    Type 2 diabetes mellitus with complication (HCC)    Mediastinal lymphadenopathy    Shortness of breath    Small cell lung cancer (HCC)    Malignant pleural effusion    Acute on chronic respiratory failure with hypoxia (HCC)    Pleural metastasis (HCC)    Hepatic metastasis (HCC)    Lung collapse    Atrial fibrillation with RVR (HCC)    Elevated lactic acid level    Cardiomyopathy (HCC)    Diastolic dysfunction    Restrictive lung disease  Resolved Problems:    * No resolved hospital problems.  *          Plan:   · Oxygen supplementation to keep saturation between 90 to 94% only  · Patient's x-ray chest and stat CT chest was reviewed  · Patient has a large pleural effusion which has worsened as compared to few weeks back along with that patient also has lung collapse in addition patient also has lung mass with adenopathy with possible pleural and hepatic metastasis with recent diagnosis of small cell lung cancer  · Patient was told about the clinical as well as radiological picture  · Patient was asked if he decompensates then whether he would like to be put on mechanical vent to support and patient wanted to continue with full code and do everything to keep him alive  · Patient was found to be in atrial flutter/fibrillation with rapid medical rate and patient was started on Cardizem drip at the admitting provider  · Patient is symptomatic at this time and has potential for further decompensation on an acute basis  · Patient was transferred to the ICU  ·  Will perform emergent ultrasound-guided thoracentesis to decrease symptoms and also to decrease the incidence of patient going into profound respite distress requiring ventilator support  · Patient would need bronchoscopy for therapeutic purposes -will tentatively arrange for tomorrow morning   · Patient to bekept NPO after midnight  · Monitor for any hypoglycemia   · Trend lactic acid levels   · BGM with SSI   · Patient has been started on Rocephin and Levaquin by admitting provider-not sure patient has any pneumonic process this time-we will discontinue Rocephin for now and reassess in the morning about continuation of Levaquin  · Patient has had extensive antibiotic therapy in the recent past at [facility] and the bronchoscopy cultures were negative for any pneumonic process at that time also  · Bronchodilators  · Patient does not have any acute bronchospasm requiring patient to be given any IV steroids  · Will request CT surgery consult for possible pleural biopsy and Pleurx catheter placement  · Consider repeat echocardiogram if deemed appropriate  · Medicine team to decide upon anticoagulation-and if so decided will recommend patient to be given heparin infusion so that patient does not have any increased bleeding with any procedures including pleural biopsy/pleurX catheter placement if so need be  · Will order TSH in AM  · PUD prophylaxis as per IM    Case d/w nursing on the floor/IM  Case d/w ICU team    Patient has potential for further decompensation and needs to monitor closely in the ICU for tonight  Further management depending on patient's clinical status and follow-up on above recommendations along with the test results    Critical care time spent on the patient was 35 minutes exclusive of any procedures    Patient has guarded long-term prognosis given that patient has metastatic small cell lung cancer-patient wants to be full code at this time-would recommend palliative care consult if deemed appropriate to discuss again about the goals of care and CODE STATUS        Electronically signed by:  Taniya Ramos MD    5/18/2020    10:19 PM.

## 2020-05-18 NOTE — PROGRESS NOTES
[facility] Upon transferring patient to ordered level of care, patients medications were gathered from the following locations and given to receiving nurse during bedside report:    Tele monitor assigned to patient and placed on patient prior to transfer. MD notified via [employer]: Dr. Dejuan Rollins  [x] Yes   [] No    Family notified of transfer:    [x] Yes   [] No    Spoke with: Pt's wife.

## 2020-05-19 ENCOUNTER — ANESTHESIA EVENT (OUTPATIENT)
Dept: OPERATING ROOM | Age: 75
DRG: 180 | End: 2020-05-19
Payer: MEDICARE

## 2020-05-19 ENCOUNTER — TELEPHONE (OUTPATIENT)
Dept: INTERNAL MEDICINE CLINIC | Age: 75
End: 2020-05-19

## 2020-05-19 LAB
A/G RATIO: 1.1 (ref 1.1–2.2)
ALBUMIN FLUID: 2.1 G/DL
ALBUMIN SERPL-MCNC: 3.2 G/DL (ref 3.4–5)
ALP BLD-CCNC: 91 U/L (ref 40–129)
ALT SERPL-CCNC: 11 U/L (ref 10–40)
ANION GAP SERPL CALCULATED.3IONS-SCNC: 11 MMOL/L (ref 3–16)
APPEARANCE FLUID: NORMAL
APTT: 30.3 SEC (ref 24.2–36.2)
AST SERPL-CCNC: 51 U/L (ref 15–37)
BASE EXCESS ARTERIAL: 2.2 MMOL/L (ref -3–3)
BASOPHILS ABSOLUTE: 0 K/UL (ref 0–0.2)
BASOPHILS RELATIVE PERCENT: 0.3 %
BILIRUB SERPL-MCNC: 0.4 MG/DL (ref 0–1)
BUN BLDV-MCNC: 63 MG/DL (ref 7–20)
CALCIUM SERPL-MCNC: 9.9 MG/DL (ref 8.3–10.6)
CARBOXYHEMOGLOBIN ARTERIAL: 0.2 % (ref 0–1.5)
CELL COUNT FLUID TYPE: NORMAL
CHLORIDE BLD-SCNC: 94 MMOL/L (ref 99–110)
CLOT EVALUATION: NORMAL
CO2: 27 MMOL/L (ref 21–32)
COLOR FLUID: NORMAL
CREAT SERPL-MCNC: 0.7 MG/DL (ref 0.8–1.3)
EKG ATRIAL RATE: 326 BPM
EKG DIAGNOSIS: NORMAL
EKG P AXIS: 262 DEGREES
EKG Q-T INTERVAL: 348 MS
EKG QRS DURATION: 110 MS
EKG QTC CALCULATION (BAZETT): 448 MS
EKG R AXIS: -49 DEGREES
EKG T AXIS: 131 DEGREES
EKG VENTRICULAR RATE: 100 BPM
EOSINOPHIL FLUID: 1 %
EOSINOPHILS ABSOLUTE: 0 K/UL (ref 0–0.6)
EOSINOPHILS RELATIVE PERCENT: 0.1 %
FLUID PATH CONSULT: YES
FLUID TYPE: NORMAL
GFR AFRICAN AMERICAN: >60
GFR NON-AFRICAN AMERICAN: >60
GLOBULIN: 2.9 G/DL
GLUCOSE BLD-MCNC: 132 MG/DL (ref 70–99)
GLUCOSE BLD-MCNC: 181 MG/DL (ref 70–99)
GLUCOSE BLD-MCNC: 189 MG/DL (ref 70–99)
HCO3 ARTERIAL: 27.5 MMOL/L (ref 21–29)
HCT VFR BLD CALC: 34.8 % (ref 40.5–52.5)
HEMOGLOBIN, ART, EXTENDED: 12.8 G/DL (ref 13.5–17.5)
HEMOGLOBIN: 11.6 G/DL (ref 13.5–17.5)
LACTIC ACID: 2.1 MMOL/L (ref 0.4–2)
LD, FLUID: 1221 U/L
LV EF: 38 %
LVEF MODALITY: NORMAL
LYMPHOCYTES ABSOLUTE: 0.6 K/UL (ref 1–5.1)
LYMPHOCYTES RELATIVE PERCENT: 5.9 %
LYMPHOCYTES, BODY FLUID: 71 %
MCH RBC QN AUTO: 28.3 PG (ref 26–34)
MCHC RBC AUTO-ENTMCNC: 33.4 G/DL (ref 31–36)
MCV RBC AUTO: 84.8 FL (ref 80–100)
METHEMOGLOBIN ARTERIAL: 0.5 %
MONOCYTE, FLUID: 15 %
MONOCYTES ABSOLUTE: 0.9 K/UL (ref 0–1.3)
MONOCYTES RELATIVE PERCENT: 9 %
MONONUCLEAR UNIDENTIFIED CELLS FLUID: 1 %
NEUTROPHIL, FLUID: 12 %
NEUTROPHILS ABSOLUTE: 8.7 K/UL (ref 1.7–7.7)
NEUTROPHILS RELATIVE PERCENT: 84.7 %
NUCLEATED CELLS FLUID: 318 /CUMM
NUMBER OF CELLS COUNTED FLUID: 100
O2 CONTENT ARTERIAL: 16 ML/DL
O2 SAT, ARTERIAL: 90.3 %
O2 THERAPY: ABNORMAL
PATH CONSULT FLUID: NORMAL
PCO2 ARTERIAL: 45.6 MMHG (ref 35–45)
PDW BLD-RTO: 16.3 % (ref 12.4–15.4)
PERFORMED ON: ABNORMAL
PERFORMED ON: ABNORMAL
PH ARTERIAL: 7.4 (ref 7.35–7.45)
PLATELET # BLD: 459 K/UL (ref 135–450)
PMV BLD AUTO: 6.7 FL (ref 5–10.5)
PO2 ARTERIAL: 63.4 MMHG (ref 75–108)
POTASSIUM REFLEX MAGNESIUM: 5.1 MMOL/L (ref 3.5–5.1)
PROTEIN FLUID: 3.4 G/DL
RBC # BLD: 4.11 M/UL (ref 4.2–5.9)
RBC FLUID: 5300 /CUMM
SODIUM BLD-SCNC: 132 MMOL/L (ref 136–145)
T3 TOTAL: 0.79 NG/ML (ref 0.8–2)
T4 FREE: 1.4 NG/DL (ref 0.9–1.8)
T4 FREE: 1.4 NG/DL (ref 0.9–1.8)
TCO2 ARTERIAL: 28.9 MMOL/L
TOTAL PROTEIN: 6.1 G/DL (ref 6.4–8.2)
TROPONIN: 0.01 NG/ML
TROPONIN: 0.01 NG/ML
TSH REFLEX FT4: 0.08 UIU/ML (ref 0.27–4.2)
TSH REFLEX: 0.1 UIU/ML (ref 0.27–4.2)
WBC # BLD: 10.2 K/UL (ref 4–11)

## 2020-05-19 PROCEDURE — 99291 CRITICAL CARE FIRST HOUR: CPT | Performed by: INTERNAL MEDICINE

## 2020-05-19 PROCEDURE — 6360000002 HC RX W HCPCS: Performed by: INTERNAL MEDICINE

## 2020-05-19 PROCEDURE — 6370000000 HC RX 637 (ALT 250 FOR IP): Performed by: INTERNAL MEDICINE

## 2020-05-19 PROCEDURE — 84443 ASSAY THYROID STIM HORMONE: CPT

## 2020-05-19 PROCEDURE — 99223 1ST HOSP IP/OBS HIGH 75: CPT | Performed by: INTERNAL MEDICINE

## 2020-05-19 PROCEDURE — 94669 MECHANICAL CHEST WALL OSCILL: CPT

## 2020-05-19 PROCEDURE — 94761 N-INVAS EAR/PLS OXIMETRY MLT: CPT

## 2020-05-19 PROCEDURE — 0B9F8ZX DRAINAGE OF RIGHT LOWER LUNG LOBE, VIA NATURAL OR ARTIFICIAL OPENING ENDOSCOPIC, DIAGNOSTIC: ICD-10-PCS | Performed by: INTERNAL MEDICINE

## 2020-05-19 PROCEDURE — 84484 ASSAY OF TROPONIN QUANT: CPT

## 2020-05-19 PROCEDURE — 87070 CULTURE OTHR SPECIMN AEROBIC: CPT

## 2020-05-19 PROCEDURE — 87106 FUNGI IDENTIFICATION YEAST: CPT

## 2020-05-19 PROCEDURE — 2000000000 HC ICU R&B

## 2020-05-19 PROCEDURE — 3609010900 HC BRONCHOSCOPY THERAPUTIC ASPIRATION INITIAL: Performed by: INTERNAL MEDICINE

## 2020-05-19 PROCEDURE — 87102 FUNGUS ISOLATION CULTURE: CPT

## 2020-05-19 PROCEDURE — 51702 INSERT TEMP BLADDER CATH: CPT

## 2020-05-19 PROCEDURE — 3609010800 HC BRONCHOSCOPY ALVEOLAR LAVAGE: Performed by: INTERNAL MEDICINE

## 2020-05-19 PROCEDURE — 82803 BLOOD GASES ANY COMBINATION: CPT

## 2020-05-19 PROCEDURE — 99222 1ST HOSP IP/OBS MODERATE 55: CPT | Performed by: THORACIC SURGERY (CARDIOTHORACIC VASCULAR SURGERY)

## 2020-05-19 PROCEDURE — 84439 ASSAY OF FREE THYROXINE: CPT

## 2020-05-19 PROCEDURE — 2500000003 HC RX 250 WO HCPCS: Performed by: INTERNAL MEDICINE

## 2020-05-19 PROCEDURE — 2580000003 HC RX 258: Performed by: INTERNAL MEDICINE

## 2020-05-19 PROCEDURE — 94660 CPAP INITIATION&MGMT: CPT

## 2020-05-19 PROCEDURE — C8929 TTE W OR WO FOL WCON,DOPPLER: HCPCS

## 2020-05-19 PROCEDURE — 87205 SMEAR GRAM STAIN: CPT

## 2020-05-19 PROCEDURE — 83605 ASSAY OF LACTIC ACID: CPT

## 2020-05-19 PROCEDURE — 2700000000 HC OXYGEN THERAPY PER DAY

## 2020-05-19 PROCEDURE — 31645 BRNCHSC W/THER ASPIR 1ST: CPT | Performed by: INTERNAL MEDICINE

## 2020-05-19 PROCEDURE — 85730 THROMBOPLASTIN TIME PARTIAL: CPT

## 2020-05-19 PROCEDURE — 85025 COMPLETE CBC W/AUTO DIFF WBC: CPT

## 2020-05-19 PROCEDURE — 94640 AIRWAY INHALATION TREATMENT: CPT

## 2020-05-19 PROCEDURE — 88112 CYTOPATH CELL ENHANCE TECH: CPT

## 2020-05-19 PROCEDURE — 0BJ08ZZ INSPECTION OF TRACHEOBRONCHIAL TREE, VIA NATURAL OR ARTIFICIAL OPENING ENDOSCOPIC: ICD-10-PCS | Performed by: INTERNAL MEDICINE

## 2020-05-19 PROCEDURE — 36415 COLL VENOUS BLD VENIPUNCTURE: CPT

## 2020-05-19 PROCEDURE — 80053 COMPREHEN METABOLIC PANEL: CPT

## 2020-05-19 PROCEDURE — 93010 ELECTROCARDIOGRAM REPORT: CPT | Performed by: INTERNAL MEDICINE

## 2020-05-19 PROCEDURE — 2709999900 HC NON-CHARGEABLE SUPPLY: Performed by: INTERNAL MEDICINE

## 2020-05-19 PROCEDURE — 99152 MOD SED SAME PHYS/QHP 5/>YRS: CPT | Performed by: INTERNAL MEDICINE

## 2020-05-19 PROCEDURE — 32555 ASPIRATE PLEURA W/ IMAGING: CPT | Performed by: INTERNAL MEDICINE

## 2020-05-19 PROCEDURE — 88305 TISSUE EXAM BY PATHOLOGIST: CPT

## 2020-05-19 RX ORDER — HEPARIN SODIUM 10000 [USP'U]/100ML
13.4 INJECTION, SOLUTION INTRAVENOUS CONTINUOUS
Status: DISCONTINUED | OUTPATIENT
Start: 2020-05-19 | End: 2020-05-21

## 2020-05-19 RX ORDER — HEPARIN SODIUM 1000 [USP'U]/ML
4000 INJECTION, SOLUTION INTRAVENOUS; SUBCUTANEOUS PRN
Status: DISCONTINUED | OUTPATIENT
Start: 2020-05-19 | End: 2020-05-21

## 2020-05-19 RX ORDER — LIDOCAINE HYDROCHLORIDE 20 MG/ML
INJECTION, SOLUTION INFILTRATION; PERINEURAL PRN
Status: DISCONTINUED | OUTPATIENT
Start: 2020-05-19 | End: 2020-05-19 | Stop reason: ALTCHOICE

## 2020-05-19 RX ORDER — FENTANYL CITRATE 50 UG/ML
INJECTION, SOLUTION INTRAMUSCULAR; INTRAVENOUS PRN
Status: DISCONTINUED | OUTPATIENT
Start: 2020-05-19 | End: 2020-05-19 | Stop reason: ALTCHOICE

## 2020-05-19 RX ORDER — MIDAZOLAM HYDROCHLORIDE 5 MG/ML
INJECTION INTRAMUSCULAR; INTRAVENOUS PRN
Status: DISCONTINUED | OUTPATIENT
Start: 2020-05-19 | End: 2020-05-19 | Stop reason: ALTCHOICE

## 2020-05-19 RX ORDER — LEVALBUTEROL 1.25 MG/.5ML
0.63 SOLUTION, CONCENTRATE RESPIRATORY (INHALATION) EVERY 8 HOURS
Status: DISCONTINUED | OUTPATIENT
Start: 2020-05-19 | End: 2020-05-20

## 2020-05-19 RX ORDER — HEPARIN SODIUM 1000 [USP'U]/ML
4000 INJECTION, SOLUTION INTRAVENOUS; SUBCUTANEOUS ONCE
Status: COMPLETED | OUTPATIENT
Start: 2020-05-19 | End: 2020-05-19

## 2020-05-19 RX ORDER — DIGOXIN 0.25 MG/ML
250 INJECTION INTRAMUSCULAR; INTRAVENOUS EVERY 4 HOURS
Status: COMPLETED | OUTPATIENT
Start: 2020-05-19 | End: 2020-05-19

## 2020-05-19 RX ORDER — MAGNESIUM HYDROXIDE 1200 MG/15ML
LIQUID ORAL CONTINUOUS PRN
Status: COMPLETED | OUTPATIENT
Start: 2020-05-19 | End: 2020-05-19

## 2020-05-19 RX ORDER — HEPARIN SODIUM 1000 [USP'U]/ML
2000 INJECTION, SOLUTION INTRAVENOUS; SUBCUTANEOUS PRN
Status: DISCONTINUED | OUTPATIENT
Start: 2020-05-19 | End: 2020-05-21

## 2020-05-19 RX ORDER — ACETYLCYSTEINE 200 MG/ML
SOLUTION ORAL; RESPIRATORY (INHALATION) PRN
Status: DISCONTINUED | OUTPATIENT
Start: 2020-05-19 | End: 2020-05-19 | Stop reason: ALTCHOICE

## 2020-05-19 RX ADMIN — IPRATROPIUM BROMIDE AND ALBUTEROL SULFATE 1 AMPULE: .5; 3 SOLUTION RESPIRATORY (INHALATION) at 07:33

## 2020-05-19 RX ADMIN — METOPROLOL SUCCINATE 50 MG: 50 TABLET, EXTENDED RELEASE ORAL at 09:50

## 2020-05-19 RX ADMIN — MUPIROCIN: 20 OINTMENT TOPICAL at 20:24

## 2020-05-19 RX ADMIN — Medication 10 ML: at 20:25

## 2020-05-19 RX ADMIN — HEPARIN SODIUM AND DEXTROSE 10 ML/HR: 10000; 5 INJECTION INTRAVENOUS at 18:34

## 2020-05-19 RX ADMIN — DIGOXIN 250 MCG: 250 INJECTION, SOLUTION INTRAMUSCULAR; INTRAVENOUS; PARENTERAL at 13:59

## 2020-05-19 RX ADMIN — DIGOXIN 250 MCG: 250 INJECTION, SOLUTION INTRAMUSCULAR; INTRAVENOUS; PARENTERAL at 11:33

## 2020-05-19 RX ADMIN — LEVALBUTEROL HYDROCHLORIDE 1.25 MG: 1.25 SOLUTION, CONCENTRATE RESPIRATORY (INHALATION) at 19:39

## 2020-05-19 RX ADMIN — Medication 10 ML: at 09:50

## 2020-05-19 RX ADMIN — HEPARIN SODIUM 4000 UNITS: 1000 INJECTION INTRAVENOUS; SUBCUTANEOUS at 18:25

## 2020-05-19 RX ADMIN — IPRATROPIUM BROMIDE AND ALBUTEROL SULFATE 1 AMPULE: .5; 3 SOLUTION RESPIRATORY (INHALATION) at 12:17

## 2020-05-19 RX ADMIN — IPRATROPIUM BROMIDE AND ALBUTEROL SULFATE 1 AMPULE: .5; 3 SOLUTION RESPIRATORY (INHALATION) at 04:12

## 2020-05-19 RX ADMIN — DILTIAZEM HYDROCHLORIDE 10 MG/HR: 5 INJECTION INTRAVENOUS at 18:24

## 2020-05-19 RX ADMIN — MELATONIN TAB 5 MG 5 MG: 5 TAB at 20:24

## 2020-05-19 RX ADMIN — IPRATROPIUM BROMIDE 0.5 MG: 0.5 SOLUTION RESPIRATORY (INHALATION) at 19:38

## 2020-05-19 RX ADMIN — MUPIROCIN: 20 OINTMENT TOPICAL at 09:50

## 2020-05-19 RX ADMIN — OXYCODONE HYDROCHLORIDE AND ACETAMINOPHEN 1 TABLET: 5; 325 TABLET ORAL at 20:24

## 2020-05-19 ASSESSMENT — PAIN DESCRIPTION - ORIENTATION: ORIENTATION: RIGHT

## 2020-05-19 ASSESSMENT — PAIN SCALES - GENERAL
PAINLEVEL_OUTOF10: 1
PAINLEVEL_OUTOF10: 2
PAINLEVEL_OUTOF10: 3
PAINLEVEL_OUTOF10: 7
PAINLEVEL_OUTOF10: 4

## 2020-05-19 ASSESSMENT — PAIN DESCRIPTION - DESCRIPTORS: DESCRIPTORS: ACHING

## 2020-05-19 ASSESSMENT — PAIN DESCRIPTION - PAIN TYPE: TYPE: ACUTE PAIN

## 2020-05-19 ASSESSMENT — PAIN DESCRIPTION - LOCATION: LOCATION: SHOULDER

## 2020-05-19 NOTE — PROGRESS NOTES
Thoracentesis complete. 600mL taken off. Pt tolerated procedure well. VSS with pt on 6L NC. Site cleaned, dressed, and intact. Labs to be sent. Pt resting comfortably with no needs at this time. Will continue to monitor.

## 2020-05-19 NOTE — TELEPHONE ENCOUNTER
----- Message from Selwyn Fishman MD sent at 5/19/2020 12:47 PM EDT -----  Contact: Pt wife [phone]  I have called her  ----- Message -----  From: Nelli Abad  Sent: 5/19/2020  12:24 PM EDT  To: Selwyn Fishman MD    Pt wife Fam Bonilla called requesting to talk to you to ask questions about Prosper Gauthier and his current state. She wouldn't elaborate.      Pt wife [phone] Cell: [phone]    Future appt-6/12/2020      SE

## 2020-05-19 NOTE — PROGRESS NOTES
BMP    Leukocytosis-possibly secondary to hemoconcentration, started on empirical antibiotic for possible pneumonia, monitor closely    Poor appetite-consider dietitian evaluation,    DVT Prophylaxis: Lovenox renally adjusted  Diet: Diet NPO Time Specified  Code Status: Full Code  PT/OT Eval Status: NA    Dispo -ICU    Jovi Ivy MD

## 2020-05-19 NOTE — CONSULTS
under the tongue every 5 minutes as needed for Chest pain. 4/20/15   Tamara Adames MD   aspirin 81 MG EC tablet Take 81 mg by mouth daily. Indications: stopped for surgery    Historical Provider, MD        Facility Administered Medications:    mupirocin   Nasal BID    [START ON 5/20/2020] enoxaparin  40 mg Subcutaneous Daily    digoxin  250 mcg Intravenous Q4H    aspirin  81 mg Oral Daily    citalopram  20 mg Oral Daily    metoprolol succinate  50 mg Oral Daily    pantoprazole  40 mg Oral Daily    sodium chloride flush  10 mL Intravenous 2 times per day    levofloxacin  750 mg Intravenous Q24H    insulin lispro  0-6 Units Subcutaneous TID WC    insulin lispro  0-3 Units Subcutaneous Nightly    ipratropium-albuterol  1 ampule Inhalation Q4H       Allergies:     Allergies   Allergen Reactions    Latex Rash    Methadone Other (See Comments)     Pt collapsed and was hospitalized for 11 days; organ shutdown    Morphine Rash     Skin peeling off    Wellbutrin [Bupropion Hcl]      Headache; constant pins and needles sensation        Social History:    Working:   Caffeine:   Lifestyle:    Social History     Socioeconomic History    Marital status:      Spouse name: Not on file    Number of children: Not on file    Years of education: Not on file    Highest education level: Not on file   Occupational History    Not on file   Social Needs    Financial resource strain: Not on file    Food insecurity     Worry: Not on file     Inability: Not on file   [employer] needs     Medical: Not on file     Non-medical: Not on file   Tobacco Use    Smoking status: Current Every Day Smoker     Packs/day: 1.00     Years: 55.00     Pack years: 55.00     Types: Cigarettes    Smokeless tobacco: Never Used    Tobacco comment: 4-5 cigs a day   Substance and Sexual Activity    Alcohol use: No    Drug use: No    Sexual activity: Yes     Partners: Female   Lifestyle    Physical activity     Days per week: Not on file     Minutes per session: Not on file    Stress: Not on file   Relationships    Social connections     Talks on phone: Not on file     Gets together: Not on file     Attends Advent service: Not on file     Active member of club or organization: Not on file     Attends meetings of clubs or organizations: Not on file     Relationship status: Not on file    Intimate partner violence     Fear of current or ex partner: Not on file     Emotionally abused: Not on file     Physically abused: Not on file     Forced sexual activity: Not on file   Other Topics Concern    Not on file   Social History Narrative    Not on file       Family History:      Problem Relation Age of Onset    Heart Disease Mother     Diabetes Father        Review of Systems:  Reviewed, negative unless noted  Constitutional: weight change, weakness, impairment of ADLs  Eyes: eyestrain, redness, discharges  ENMT: post nasal drip, sinus pain, discharge   Cardiovascular: faintness, vertigo, color changes in fingers/toes  Respiratory: cough, sputum, hemoptysis  GI: excessive thirst, changes in bowel habits, abdominal swelling  : painful urination, pyuria, incontinence  Musculoskeletal: cramps, swelling, limitation of motor activity  Integumentary: cyanosis, changes in skin, dryness  Neurological: paralysis, tingling, tremors  Psychiatric: restlessness, irritability, mood swings  Endocrine: heat/cold intolerance, excessive sweating, hair loss  Hematologic/lymphatic: swollen glands, anemia, easy bruising/bleeding      Physical Examination:    BP (!) 120/58   Pulse 102 Comment: afib  Temp 97.8 °F (36.6 °C) (Oral)   Resp 24   Ht 5' 11\" (1.803 m)   Wt 192 lb 7.4 oz (87.3 kg)   SpO2 96%   BMI 26.84 kg/m²      BP RUE:  BP LUE:   Admission Weight: 192 lb 7.4 oz (87.3 kg)   Hand dominance:    General appearance: NAD, well nourished  Eyes: anicteric, PERRLA  ENMT: no scars or lesions, no nasal deformity, normal dentition, no cyanosis of oral mucosa  Neck: no masses, no thyroid enlargement, no JVD. Respiratory: mild distress using accessory muscles, decreased BS Right side  Cardiovascular: regular, no murmur. PMI normal, no thrill. No carotid bruits. No edema or varicosities. Abdominal aorta cannot be appreciated given body habitus. GI: abdomen soft, nondistended, no organomegaly. No masses. Lymphatic: no cervical/supraclavicular adenopathy  Musculoskeletal: strength and tone normal. Full ROM. No scoliosis. Extremities: warm and pink. No clubbing or petechiae. Skin: no dermatitis or ulceration. No nodularity or induration. Neuro: CN grossly intact. Sensation and motor function grossly intact. Psychiatric: oriented, appropriate mood/affect. MEDICAL DECISION MAKING/TESTING  Studies personally reviewed.     Echo:   Echo Complete   Order: [id number]   Status:  Final result   Visible to patient:  No (Not Released) Next appt:  06/12/2020 at 11:40 AM in Internal Medicine (Robbin Rick MD)   Details     Reading Physician Reading Date Result Priority   Antonia Eugene MD  [phone] 5/19/2020       Narrative & Impression     Transthoracic Echocardiography Report (TTE)      Demographics      Patient Name       Wilmar Font      Date of Study      05/19/2020         Gender              Male      Patient Number     [id number]         Date of Birth       1945      Visit Number       [id number]          Age                 70 year(s)      Accession Number   [id number]          Room Number         1570      Corporate ID       [id number]             Sonographer         Geoff Yeager,                                                             [address] Physician Bianca Martin, [facility].                      MD                 Physician           Yolande Lake MD, [facility] [place]     Procedure     Type of Study      TTE procedure:ECHOCARDIOGRAM COMPLETE 2D W DOPPLER W COLOR. Procedure Date  Date: 05/19/2020 Start: 07:58 AM     Study Location: [address] [place] [address] - Echo Lab  Technical Quality: Adequate visualization     Indications:Congestive heart failure, Atrial fibrillation and Pleural  effusion.     Patient Status: Routine     Contrast Medium: Definity.     Height: 71 inches Weight: 192 pounds BSA: 2.07 m2 BMI: 26.78 kg/m2     BP: 104/56 mmHg      Conclusions      Summary   The left ventricular systolic function is moderately reduced with an   ejection fraction of 35-40 %. There is akinesis of the apical septal and apical anterior walls. There is mild concentric left ventricular hypertrophy. Elevated left ventricular filling pressure. Septal E/e'= 12.1 IVRT = 56 msec . Left ventricular cavity size is normal.   Tricuspid valve is structurally normal.   Mild tricuspid regurgitation. Systolic pulmonary artery pressure (SPAP) is estimated at 42 mmHg consistent   with mild pulmonary hypertension (Right atrial pressure of 8 mmHg). Small anterior pericardial effusion noted. There is a large right pleural effusion in subcostal views. Definity echo contrast was used no intracardiac thrombus or mass was seen. Signature      ------------------------------------------------------------------   Electronically signed by Flavia Mcdaniel MD, [facility] (Interpreting   physician) on 05/19/2020 at 09:42 AM   ------------------------------------------------------------------      Findings      Left Ventricle   The left ventricular systolic function is moderately reduced with an   ejection fraction of 35-40 %. There is akinesis of the apical septal and apical anterior walls. There is mild concentric left ventricular hypertrophy. Elevated left ventricular filling pressure. Septal E/e'= 12.1 IVRT = 56 msec . Left ventricular cavity size is normal.      Mitral Valve   Mild mitral annular calcification.    Mild lung atelectasis. Scattered granulomatous calcifications throughout the right lung.  A large   right pleural effusion is noted. Oval Cools is redemonstration of pleural   implants consistent with metastatic disease.  Mild dependent left lower lobe   atelectasis.  No acute infiltrate or significant pleural fluid.  Mild   emphysematous changes in the upper lobes.       Upper Abdomen: Heterogeneity of the hepatic parenchyma suggesting metastatic   involvement.  The visualized upper abdominal viscera are otherwise   unremarkable.  Mild retained stool in the colon.       Soft Tissues/Bones: No acute osseous or soft tissue abnormality.  Left-sided   venous access port.  Right axillary adenopathy measuring 1.7 cm maximally.           Impression   1. Obstruction of the right mainstem bronchus proximally with retained   secretions with resultant complete right lung atelectasis.  Large right   pleural effusion, increased from the comparison study.  Pleural implants   consistent with metastatic disease are again noted. 2. Redemonstration of extensive mediastinal adenopathy. 3. Mild emphysematous changes on the left.  Mild dependent atelectasis.  No   acute infiltrate. 4. Heterogeneity of the hepatic parenchyma suggesting metastatic involvement.                 Ultrasound thoracentesis 4/20/20:  Narrative   PROCEDURE:   ULTRASOUNDGUIDED RIGHT THORACENTESIS       4/20/2020       HISTORY:   ORDERING SYSTEM PROVIDED HISTORY: Pleural effusion   TECHNOLOGIST PROVIDED HISTORY:   Reason for exam:->pleural effusion       TECHNIQUE:   Informed consent was obtained after a detailed explanation of the procedure   including risks, benefits, and alternatives.  Universal protocol was   performed. The right chest was prepped and draped in sterile fashion and   local anesthesia was achieved with lidocaine.  An 5 Palestinian needle sheath was   advanced under ultrasound guidance into pleural effusion and thoracentesis   was performed.  The

## 2020-05-19 NOTE — CARE COORDINATION
Writer attempted to call patient room for initial assessment. No answer. Call placed and message left for wife Jovanny Rodriguez at [phone]. Awaiting returned call. Chart review was completed. Pt has had several ER visits and an admission in the last month due to respiratory issues. Pt underwent a thoracentesis today. Needs are unclear at this time. CM will continue to attempt assessment. Please advise of any needs as they arise.  Henry Rajan RN

## 2020-05-19 NOTE — CONSULTS
[address]  [phone]        Reason for Consultation/Chief Complaint: \"I have been having shortness of breath . \"    History of Present Illness:  Manny Fisher is a 70 y.o. patient who presented to the hospital with complaints of shortness of breath for last two months. Two months ago he was diagnosed with small cell lung cancer. Early part of May 2020 patient had right thoracentesis at [facility]. Patient had left AMA. He had gotten worse in terms of SOB. He was admitted to [facility]. Patient says he has received two radiation treatments of his rt lung. He is transferred here from [facility] for further care on 5.18.20. Today I have been asked to see him for his cardiac arrhythmia. He has known history of parox afib. CAD old anterior wall MI and stents in 2001. He does not know the details and is visibly short of breath to carry out long conversations. He denies any chest pain fever chills nausea or abdominal pain. I have been asked to provide consultation regarding further management and testing. Past Medical History:   has a past medical history of Abnormal CT of the chest, Anxiety, Back pain, CAD (coronary artery disease), Cancer ([address] CHF (congestive heart failure) ([address] COPD (chronic obstructive pulmonary disease) ([address] Depression, Diabetes mellitus ([address] Heart disease, Hernia, History of blood transfusion, Hyperlipidemia, Hypertension, Osteoarthritis, Prolonged emergence from general anesthesia, Pulmonary nodule, Rheumatic fever, Small cell lung cancer ([address] and Type II or unspecified type diabetes mellitus without mention of complication, not stated as uncontrolled. Surgical History:   has a past surgical history that includes Circumcision; Cardiac surgery;  Dental surgery; pr njx dx/ther sbst intrlmnr crv/thrc w/img gdn (Right, 7/31/2018); pr njx dx/ther sbst intrlmnr crv/thrc w/img gdn (Bilateral, 9/18/2018); pr njx Lainey Dumont MD   albuterol sulfate  (90 Base) MCG/ACT inhaler Inhale 2 puffs into the lungs every 6 hours as needed for Wheezing 10/8/19   Mercedez Mitchell MD   blood glucose test strips (CONTOUR NEXT TEST) strip TEST ONCE DAILY DX: E11.9 10/3/19   Mercedez Mitchell MD   pravastatin (PRAVACHOL) 80 MG tablet Take 1 tablet by mouth daily 10/3/19   Tiana Menezes MD   lisinopril (PRINIVIL;ZESTRIL) 40 MG tablet Take 1 tablet by mouth daily 9/24/19   Tiana Menezes MD   metoprolol succinate (TOPROL XL) 50 MG extended release tablet Take 1 tablet by mouth daily 9/9/19   Tiana Menezes MD   nitroGLYCERIN (NITROSTAT) 0.4 MG SL tablet Place 1 tablet under the tongue every 5 minutes as needed for Chest pain. 4/20/15   Mercedez Mitchell MD   aspirin 81 MG EC tablet Take 81 mg by mouth daily. Indications: stopped for surgery    Historical Provider, MD        Allergies:  Latex; Methadone; Morphine; and Wellbutrin [bupropion hcl]     Review of Systems:   12 point ROS negative in all areas as listed below except as in [facility]  Constitutional, EENT, Cardiovascular, pulmonary, GI, , Musculoskeletal, skin, neurological, hematological, endocrine, Psychiatric    Physical Examination:    Vitals:    05/19/20 0700   BP: 96/80   Pulse: 93   Resp: 17   Temp:    SpO2: 92%    Weight: 192 lb 7.4 oz (87.3 kg)         General Appearance:  Alert, cooperative, Mild resp distress, appears stated age   Head:  Normocephalic, without obvious abnormality, atraumatic   Eyes:  PERRL, conjunctiva/corneas clear       Nose: Nares normal, no drainage or sinus tenderness   Throat: Lips, mucosa, and tongue normal   Neck: Supple, symmetrical, trachea midline, no adenopathy, thyroid: not enlarged, symmetric, no tenderness/mass/nodules, no carotid bruit or JVD       Lungs:   Clear to auscultation on left. Dullness and absent breat h sounds on right.    Chest Wall:  No tenderness or deformity   Heart:  Regular rate and rhythm, S1, S2 normal, hepatic parenchyma suggesting metastatic involvement. EKG 5.18.20  Atrial flutter with variable A-V blockLeft axis deviationLeft ventricular hypertrophy with repolarization abnormalityAnteroseptal infarct (cited on or before 08-SEP-2008)Abnormal ECGWhen compared with ECG of 06-MAY-2020 09:26,Atrial flutter has replaced Sinus rhythmT wave inversion no longer evident in Inferior leadsT wave inversion less evident in Lateral leadsConfirmed by Susan Tinsley MD, [address] (1986) on 5/19/2020 8:06:40 AM     Assessment  Atrial flutter rate controlled with cardizem drip  CAD with old anterior wall MI   Ischemic cardiomyopathy  No pericardial effusion  Small cell lung cancer rt lung with pleural effusion and right liver involvement. Unable to tell from epic chart if ascites has been tapped on not  S/p left thoracentesis 5.18.20  Patient Active Problem List   Diagnosis    Obesity    Snoring    Type 2 diabetes mellitus with complication ([address]    Coronary artery disease involving native coronary artery of native heart without angina pectoris    Mixed hyperlipidemia    Essential hypertension    Abdominal aortic aneurysm (AAA) without rupture (HCC)    Pulmonary nodule    Lung mass    Mediastinal lymphadenopathy    Pleural effusion    Small cell lung cancer, right (HCC)    Shortness of breath    Chronic respiratory failure with hypoxia (HCC)    Small cell lung cancer ([address]    Malignant pleural effusion    Acute on chronic respiratory failure with hypoxia (HCC)    Pleural metastasis (HCC)    Hepatic metastasis (HCC)    Lung collapse    Atrial fibrillation with RVR (HCC)    Elevated lactic acid level    Cardiomyopathy ([address]    Diastolic dysfunction    Restrictive lung disease         Plan:    I had the opportunity to review the clinical symptoms and presentation of Mechelle Foster.      I recommend that the patient undergo anticoagulation with heparin   He is high risk for thromboembolism  I  Will add digoxin for

## 2020-05-19 NOTE — PROGRESS NOTES
Patient's O2 saturations began to decrease to 83%. NC was increased to 8L. O2 sats increased to 86% after 3 minutes. Non-re breather placed at 12L. Current O2 sats 96%. Patient continued to struggle with urinating. 16 fr. Latex free paulson placed. Patient agreed to remove his dirty pants. Patient now resting comfortably.

## 2020-05-20 ENCOUNTER — ANESTHESIA (OUTPATIENT)
Dept: OPERATING ROOM | Age: 75
DRG: 180 | End: 2020-05-20
Payer: MEDICARE

## 2020-05-20 ENCOUNTER — APPOINTMENT (OUTPATIENT)
Dept: GENERAL RADIOLOGY | Age: 75
DRG: 180 | End: 2020-05-20
Attending: INTERNAL MEDICINE
Payer: MEDICARE

## 2020-05-20 VITALS — OXYGEN SATURATION: 99 % | DIASTOLIC BLOOD PRESSURE: 61 MMHG | SYSTOLIC BLOOD PRESSURE: 114 MMHG

## 2020-05-20 LAB
APTT: 32.7 SEC (ref 24.2–36.2)
APTT: 43.7 SEC (ref 24.2–36.2)
BASE EXCESS ARTERIAL: 0.9 MMOL/L (ref -3–3)
CARBOXYHEMOGLOBIN ARTERIAL: 0.1 % (ref 0–1.5)
GLUCOSE BLD-MCNC: 133 MG/DL (ref 70–99)
GLUCOSE BLD-MCNC: 151 MG/DL (ref 70–99)
GLUCOSE BLD-MCNC: 159 MG/DL (ref 70–99)
GLUCOSE BLD-MCNC: 200 MG/DL (ref 70–99)
HCO3 ARTERIAL: 25.7 MMOL/L (ref 21–29)
HEMOGLOBIN, ART, EXTENDED: 12.6 G/DL (ref 13.5–17.5)
METHEMOGLOBIN ARTERIAL: 0.5 %
O2 CONTENT ARTERIAL: 16 ML/DL
O2 SAT, ARTERIAL: 92.4 %
O2 THERAPY: ABNORMAL
PCO2 ARTERIAL: 41.4 MMHG (ref 35–45)
PERFORMED ON: ABNORMAL
PH ARTERIAL: 7.41 (ref 7.35–7.45)
PO2 ARTERIAL: 67.5 MMHG (ref 75–108)
T3 TOTAL: 0.77 NG/ML (ref 0.8–2)
T4 TOTAL: 6.8 UG/DL (ref 4.5–10.9)
TCO2 ARTERIAL: 26.9 MMOL/L

## 2020-05-20 PROCEDURE — 32550 INSERT PLEURAL CATH: CPT | Performed by: THORACIC SURGERY (CARDIOTHORACIC VASCULAR SURGERY)

## 2020-05-20 PROCEDURE — 2580000003 HC RX 258: Performed by: INTERNAL MEDICINE

## 2020-05-20 PROCEDURE — 84436 ASSAY OF TOTAL THYROXINE: CPT

## 2020-05-20 PROCEDURE — 71045 X-RAY EXAM CHEST 1 VIEW: CPT

## 2020-05-20 PROCEDURE — 2500000003 HC RX 250 WO HCPCS: Performed by: NURSE ANESTHETIST, CERTIFIED REGISTERED

## 2020-05-20 PROCEDURE — 6360000002 HC RX W HCPCS: Performed by: INTERNAL MEDICINE

## 2020-05-20 PROCEDURE — 2500000003 HC RX 250 WO HCPCS: Performed by: INTERNAL MEDICINE

## 2020-05-20 PROCEDURE — 6370000000 HC RX 637 (ALT 250 FOR IP): Performed by: INTERNAL MEDICINE

## 2020-05-20 PROCEDURE — 3600000002 HC SURGERY LEVEL 2 BASE: Performed by: THORACIC SURGERY (CARDIOTHORACIC VASCULAR SURGERY)

## 2020-05-20 PROCEDURE — 2709999900 HC NON-CHARGEABLE SUPPLY: Performed by: THORACIC SURGERY (CARDIOTHORACIC VASCULAR SURGERY)

## 2020-05-20 PROCEDURE — 0W993ZZ DRAINAGE OF RIGHT PLEURAL CAVITY, PERCUTANEOUS APPROACH: ICD-10-PCS | Performed by: THORACIC SURGERY (CARDIOTHORACIC VASCULAR SURGERY)

## 2020-05-20 PROCEDURE — 85730 THROMBOPLASTIN TIME PARTIAL: CPT

## 2020-05-20 PROCEDURE — 36415 COLL VENOUS BLD VENIPUNCTURE: CPT

## 2020-05-20 PROCEDURE — C1729 CATH, DRAINAGE: HCPCS | Performed by: THORACIC SURGERY (CARDIOTHORACIC VASCULAR SURGERY)

## 2020-05-20 PROCEDURE — 84480 ASSAY TRIIODOTHYRONINE (T3): CPT

## 2020-05-20 PROCEDURE — 2500000003 HC RX 250 WO HCPCS: Performed by: THORACIC SURGERY (CARDIOTHORACIC VASCULAR SURGERY)

## 2020-05-20 PROCEDURE — 94761 N-INVAS EAR/PLS OXIMETRY MLT: CPT

## 2020-05-20 PROCEDURE — 3700000000 HC ANESTHESIA ATTENDED CARE: Performed by: THORACIC SURGERY (CARDIOTHORACIC VASCULAR SURGERY)

## 2020-05-20 PROCEDURE — 2700000000 HC OXYGEN THERAPY PER DAY

## 2020-05-20 PROCEDURE — 6360000002 HC RX W HCPCS

## 2020-05-20 PROCEDURE — 6360000002 HC RX W HCPCS: Performed by: THORACIC SURGERY (CARDIOTHORACIC VASCULAR SURGERY)

## 2020-05-20 PROCEDURE — 99232 SBSQ HOSP IP/OBS MODERATE 35: CPT | Performed by: INTERNAL MEDICINE

## 2020-05-20 PROCEDURE — 6360000002 HC RX W HCPCS: Performed by: NURSE ANESTHETIST, CERTIFIED REGISTERED

## 2020-05-20 PROCEDURE — 3700000001 HC ADD 15 MINUTES (ANESTHESIA): Performed by: THORACIC SURGERY (CARDIOTHORACIC VASCULAR SURGERY)

## 2020-05-20 PROCEDURE — 3600000012 HC SURGERY LEVEL 2 ADDTL 15MIN: Performed by: THORACIC SURGERY (CARDIOTHORACIC VASCULAR SURGERY)

## 2020-05-20 PROCEDURE — 94660 CPAP INITIATION&MGMT: CPT

## 2020-05-20 PROCEDURE — 94669 MECHANICAL CHEST WALL OSCILL: CPT

## 2020-05-20 PROCEDURE — 99233 SBSQ HOSP IP/OBS HIGH 50: CPT | Performed by: INTERNAL MEDICINE

## 2020-05-20 PROCEDURE — 94640 AIRWAY INHALATION TREATMENT: CPT

## 2020-05-20 PROCEDURE — 2580000003 HC RX 258: Performed by: NURSE ANESTHETIST, CERTIFIED REGISTERED

## 2020-05-20 PROCEDURE — 82803 BLOOD GASES ANY COMBINATION: CPT

## 2020-05-20 PROCEDURE — 2000000000 HC ICU R&B

## 2020-05-20 PROCEDURE — P9047 ALBUMIN (HUMAN), 25%, 50ML: HCPCS | Performed by: NURSE ANESTHETIST, CERTIFIED REGISTERED

## 2020-05-20 RX ORDER — SODIUM CHLORIDE, SODIUM LACTATE, POTASSIUM CHLORIDE, CALCIUM CHLORIDE 600; 310; 30; 20 MG/100ML; MG/100ML; MG/100ML; MG/100ML
INJECTION, SOLUTION INTRAVENOUS CONTINUOUS PRN
Status: DISCONTINUED | OUTPATIENT
Start: 2020-05-20 | End: 2020-05-20 | Stop reason: SDUPTHER

## 2020-05-20 RX ORDER — ONDANSETRON 2 MG/ML
4 INJECTION INTRAMUSCULAR; INTRAVENOUS PRN
Status: DISCONTINUED | OUTPATIENT
Start: 2020-05-20 | End: 2020-05-22

## 2020-05-20 RX ORDER — HEPARIN SODIUM 1000 [USP'U]/ML
2000 INJECTION, SOLUTION INTRAVENOUS; SUBCUTANEOUS ONCE
Status: COMPLETED | OUTPATIENT
Start: 2020-05-20 | End: 2020-05-20

## 2020-05-20 RX ORDER — HYDROMORPHONE HCL 110MG/55ML
0.5 PATIENT CONTROLLED ANALGESIA SYRINGE INTRAVENOUS EVERY 4 HOURS PRN
Status: COMPLETED | OUTPATIENT
Start: 2020-05-20 | End: 2020-05-20

## 2020-05-20 RX ORDER — DIGOXIN 0.25 MG/ML
125 INJECTION INTRAMUSCULAR; INTRAVENOUS DAILY
Status: DISCONTINUED | OUTPATIENT
Start: 2020-05-20 | End: 2020-05-22

## 2020-05-20 RX ORDER — BUPIVACAINE HYDROCHLORIDE AND EPINEPHRINE 2.5; 5 MG/ML; UG/ML
INJECTION, SOLUTION EPIDURAL; INFILTRATION; INTRACAUDAL; PERINEURAL PRN
Status: DISCONTINUED | OUTPATIENT
Start: 2020-05-20 | End: 2020-05-20 | Stop reason: ALTCHOICE

## 2020-05-20 RX ORDER — LABETALOL HYDROCHLORIDE 5 MG/ML
5 INJECTION, SOLUTION INTRAVENOUS EVERY 10 MIN PRN
Status: DISCONTINUED | OUTPATIENT
Start: 2020-05-20 | End: 2020-05-22

## 2020-05-20 RX ORDER — OXYCODONE HYDROCHLORIDE AND ACETAMINOPHEN 5; 325 MG/1; MG/1
2 TABLET ORAL PRN
Status: DISPENSED | OUTPATIENT
Start: 2020-05-20 | End: 2020-05-20

## 2020-05-20 RX ORDER — ALBUMIN (HUMAN) 12.5 G/50ML
SOLUTION INTRAVENOUS PRN
Status: DISCONTINUED | OUTPATIENT
Start: 2020-05-20 | End: 2020-05-20 | Stop reason: SDUPTHER

## 2020-05-20 RX ORDER — PROPOFOL 10 MG/ML
INJECTION, EMULSION INTRAVENOUS PRN
Status: DISCONTINUED | OUTPATIENT
Start: 2020-05-20 | End: 2020-05-20 | Stop reason: SDUPTHER

## 2020-05-20 RX ORDER — LIDOCAINE HYDROCHLORIDE 20 MG/ML
INJECTION, SOLUTION INFILTRATION; PERINEURAL PRN
Status: DISCONTINUED | OUTPATIENT
Start: 2020-05-20 | End: 2020-05-20 | Stop reason: SDUPTHER

## 2020-05-20 RX ORDER — PROMETHAZINE HYDROCHLORIDE 25 MG/ML
6.25 INJECTION, SOLUTION INTRAMUSCULAR; INTRAVENOUS
Status: DISCONTINUED | OUTPATIENT
Start: 2020-05-20 | End: 2020-05-22

## 2020-05-20 RX ORDER — OXYCODONE HYDROCHLORIDE AND ACETAMINOPHEN 5; 325 MG/1; MG/1
1 TABLET ORAL PRN
Status: ACTIVE | OUTPATIENT
Start: 2020-05-20 | End: 2020-05-20

## 2020-05-20 RX ORDER — HYDRALAZINE HYDROCHLORIDE 20 MG/ML
5 INJECTION INTRAMUSCULAR; INTRAVENOUS EVERY 10 MIN PRN
Status: DISCONTINUED | OUTPATIENT
Start: 2020-05-20 | End: 2020-05-22

## 2020-05-20 RX ORDER — DIPHENHYDRAMINE HYDROCHLORIDE 50 MG/ML
12.5 INJECTION INTRAMUSCULAR; INTRAVENOUS
Status: ACTIVE | OUTPATIENT
Start: 2020-05-20 | End: 2020-05-20

## 2020-05-20 RX ORDER — LEVALBUTEROL 1.25 MG/.5ML
0.63 SOLUTION, CONCENTRATE RESPIRATORY (INHALATION) 2 TIMES DAILY
Status: DISCONTINUED | OUTPATIENT
Start: 2020-05-20 | End: 2020-05-23

## 2020-05-20 RX ORDER — MIDAZOLAM HYDROCHLORIDE 1 MG/ML
INJECTION INTRAMUSCULAR; INTRAVENOUS PRN
Status: DISCONTINUED | OUTPATIENT
Start: 2020-05-20 | End: 2020-05-20 | Stop reason: SDUPTHER

## 2020-05-20 RX ORDER — HYDROMORPHONE HCL 110MG/55ML
PATIENT CONTROLLED ANALGESIA SYRINGE INTRAVENOUS
Status: COMPLETED
Start: 2020-05-20 | End: 2020-05-20

## 2020-05-20 RX ADMIN — CITALOPRAM HYDROBROMIDE 20 MG: 20 TABLET ORAL at 08:09

## 2020-05-20 RX ADMIN — LIDOCAINE HYDROCHLORIDE 60 MG: 20 INJECTION, SOLUTION INFILTRATION; PERINEURAL at 10:08

## 2020-05-20 RX ADMIN — LEVALBUTEROL HYDROCHLORIDE 0.63 MG: 1.25 SOLUTION, CONCENTRATE RESPIRATORY (INHALATION) at 08:31

## 2020-05-20 RX ADMIN — IPRATROPIUM BROMIDE 0.5 MG: 0.5 SOLUTION RESPIRATORY (INHALATION) at 08:31

## 2020-05-20 RX ADMIN — CEFAZOLIN SODIUM 2 G: 10 INJECTION, POWDER, FOR SOLUTION INTRAVENOUS at 10:15

## 2020-05-20 RX ADMIN — INSULIN LISPRO 1 UNITS: 100 INJECTION, SOLUTION INTRAVENOUS; SUBCUTANEOUS at 20:20

## 2020-05-20 RX ADMIN — IPRATROPIUM BROMIDE 0.5 MG: 0.5 SOLUTION RESPIRATORY (INHALATION) at 16:21

## 2020-05-20 RX ADMIN — PROPOFOL 20 MG: 10 INJECTION, EMULSION INTRAVENOUS at 10:19

## 2020-05-20 RX ADMIN — DIGOXIN 125 MCG: 250 INJECTION, SOLUTION INTRAMUSCULAR; INTRAVENOUS; PARENTERAL at 12:01

## 2020-05-20 RX ADMIN — SODIUM CHLORIDE, POTASSIUM CHLORIDE, SODIUM LACTATE AND CALCIUM CHLORIDE: 600; 310; 30; 20 INJECTION, SOLUTION INTRAVENOUS at 10:08

## 2020-05-20 RX ADMIN — INSULIN LISPRO 1 UNITS: 100 INJECTION, SOLUTION INTRAVENOUS; SUBCUTANEOUS at 08:23

## 2020-05-20 RX ADMIN — MUPIROCIN: 20 OINTMENT TOPICAL at 20:19

## 2020-05-20 RX ADMIN — LEVALBUTEROL HYDROCHLORIDE 0.63 MG: 1.25 SOLUTION, CONCENTRATE RESPIRATORY (INHALATION) at 16:21

## 2020-05-20 RX ADMIN — IPRATROPIUM BROMIDE 0.5 MG: 0.5 SOLUTION RESPIRATORY (INHALATION) at 19:55

## 2020-05-20 RX ADMIN — DILTIAZEM HYDROCHLORIDE 10 MG/HR: 5 INJECTION INTRAVENOUS at 06:50

## 2020-05-20 RX ADMIN — ASPIRIN 81 MG: 81 TABLET, COATED ORAL at 08:09

## 2020-05-20 RX ADMIN — PROPOFOL 10 MG: 10 INJECTION, EMULSION INTRAVENOUS at 10:23

## 2020-05-20 RX ADMIN — HEPARIN SODIUM AND DEXTROSE 11.7 ML/HR: 10000; 5 INJECTION INTRAVENOUS at 22:02

## 2020-05-20 RX ADMIN — ALBUMIN (HUMAN) 250 ML: 0.25 INJECTION, SOLUTION INTRAVENOUS at 10:23

## 2020-05-20 RX ADMIN — LEVALBUTEROL HYDROCHLORIDE: 1.25 SOLUTION, CONCENTRATE RESPIRATORY (INHALATION) at 19:54

## 2020-05-20 RX ADMIN — Medication 10 ML: at 08:09

## 2020-05-20 RX ADMIN — HYDROMORPHONE HYDROCHLORIDE 0.5 MG: 2 INJECTION, SOLUTION INTRAMUSCULAR; INTRAVENOUS; SUBCUTANEOUS at 12:10

## 2020-05-20 RX ADMIN — MUPIROCIN: 20 OINTMENT TOPICAL at 08:09

## 2020-05-20 RX ADMIN — METOPROLOL SUCCINATE 50 MG: 50 TABLET, EXTENDED RELEASE ORAL at 08:09

## 2020-05-20 RX ADMIN — MIDAZOLAM HYDROCHLORIDE 1 MG: 2 INJECTION, SOLUTION INTRAMUSCULAR; INTRAVENOUS at 10:08

## 2020-05-20 RX ADMIN — Medication 10 ML: at 22:06

## 2020-05-20 RX ADMIN — HYDROMORPHONE HYDROCHLORIDE: 2 INJECTION INTRAMUSCULAR; INTRAVENOUS; SUBCUTANEOUS at 11:45

## 2020-05-20 RX ADMIN — PANTOPRAZOLE SODIUM 40 MG: 40 TABLET, DELAYED RELEASE ORAL at 08:09

## 2020-05-20 RX ADMIN — HEPARIN SODIUM 2000 UNITS: 1000 INJECTION INTRAVENOUS; SUBCUTANEOUS at 03:27

## 2020-05-20 ASSESSMENT — PULMONARY FUNCTION TESTS
PIF_VALUE: 0
PIF_VALUE: 1
PIF_VALUE: 0

## 2020-05-20 ASSESSMENT — PAIN DESCRIPTION - PAIN TYPE
TYPE: SURGICAL PAIN
TYPE: ACUTE PAIN

## 2020-05-20 ASSESSMENT — PAIN DESCRIPTION - DESCRIPTORS
DESCRIPTORS: ACHING
DESCRIPTORS: DISCOMFORT;TIGHTNESS

## 2020-05-20 ASSESSMENT — PAIN SCALES - GENERAL
PAINLEVEL_OUTOF10: 3
PAINLEVEL_OUTOF10: 9
PAINLEVEL_OUTOF10: 0

## 2020-05-20 ASSESSMENT — PAIN DESCRIPTION - FREQUENCY
FREQUENCY: CONTINUOUS
FREQUENCY: CONTINUOUS

## 2020-05-20 ASSESSMENT — PAIN DESCRIPTION - LOCATION
LOCATION: SHOULDER
LOCATION: SHOULDER

## 2020-05-20 ASSESSMENT — PAIN - FUNCTIONAL ASSESSMENT
PAIN_FUNCTIONAL_ASSESSMENT: PREVENTS OR INTERFERES WITH ALL ACTIVE AND SOME PASSIVE ACTIVITIES
PAIN_FUNCTIONAL_ASSESSMENT: PREVENTS OR INTERFERES SOME ACTIVE ACTIVITIES AND ADLS

## 2020-05-20 ASSESSMENT — PAIN DESCRIPTION - ONSET: ONSET: UNABLE TO TELL

## 2020-05-20 ASSESSMENT — ENCOUNTER SYMPTOMS: SHORTNESS OF BREATH: 1

## 2020-05-20 ASSESSMENT — PAIN DESCRIPTION - ORIENTATION
ORIENTATION: RIGHT
ORIENTATION: RIGHT

## 2020-05-20 ASSESSMENT — PAIN DESCRIPTION - PROGRESSION: CLINICAL_PROGRESSION: NOT CHANGED

## 2020-05-20 NOTE — PROGRESS NOTES
05/20/20 1145   Oxygen Therapy/Pulse Ox   O2 Therapy Oxygen humidified   O2 Device Heated high flow cannula   O2 Flow Rate (L/min) 40 L/min   FiO2  60 %   Resp 24   SpO2 97 %   Humidification Temp Measured 37

## 2020-05-20 NOTE — CONSULTS
Inpatient consult to Palliative Care  Consult performed by: Maria Luisa Lyn, RN  Consult ordered by: Lali Aponte, APRN - CNP  Reason for consult: GOC, Code status, Sx mgt            Palliative Care Initial Note  Palliative Care Admit date:  5/20/2020  Late entry from Michael Amado [address]:  Reviewed the durable general power of  in this EMR and pts spouse, Adelaida Bedoya, is designated as his proxy. Pt currently has a full code status. Plan of care/goals:  Earlier this morning, elected to defer mtg w/ pt as he was having increased WOB and awaiting procedure, pt drowsy post procedure. Spoke w/ Marcha Can via phone who verb'd \"when he was diagnosed, they told us the cancer was in both lungs, around his stomach and possibly in his bones. \"   The pt was informed that he had a life expectancy of \"3-6 months w/o treatment\" according to spouse and that the team \"couldn't guarantee quantity but could work on quality. \"  Spouse shared that they took the news \"very hard but my  is the one who decided he wanted to take the radiation and chemo. \"   In today's discussion, the wife mentioned the future involvement of hospice w/o writer's provocation. She feels, in order for pt to be able to come home safely, he'll need to dispense \"his own medications and be able to get around w/o falling. \" She stated that pt has fallen \"6 or 7 times in the last six months when his legs just give out on him\" and she cannot pick him up w/o help. In fact, Mrs. Lian Holm feels limited in her ability to provide hands on care, overall. Social/Spiritual:  Pt and spouse live in [facility]). Pt served in [employer] for two years and wife stated that they have been informed that his multiple maladies are a direct result of agent orange exposure though it does not sound as though the [facility] notes pts medical issues as being service connected. They have an adult son, Massimo Moses, who lives nearby but Mrs. Lian Holm states he is having a very difficult

## 2020-05-20 NOTE — PLAN OF CARE
Problem: Falls - Risk of:  Goal: Will remain free from falls  Description: Will remain free from falls  Outcome: Ongoing  Goal: Absence of physical injury  Description: Absence of physical injury  Outcome: Ongoing     Problem: Pain:  Goal: Pain level will decrease  Description: Pain level will decrease  Outcome: Ongoing  Goal: Control of acute pain  Description: Control of acute pain  Outcome: Ongoing  Goal: Control of chronic pain  Description: Control of chronic pain  Outcome: Ongoing     Problem:  Activity:  Goal: Ability to tolerate increased activity will improve  Description: Ability to tolerate increased activity will improve  Outcome: Ongoing     Problem: Breathing Pattern - Ineffective:  Goal: Ability to achieve and maintain a regular respiratory rate will improve  Description: Ability to achieve and maintain a regular respiratory rate will improve  Outcome: Ongoing

## 2020-05-20 NOTE — ANESTHESIA PRE PROCEDURE
Department of Anesthesiology  Preprocedure Note       Name:  Karen Braun   Age:  70 y.o.  :  1945                                          MRN:  [id number]         Date:  2020      Surgeon: Guille Green):  Benjamin Walters MD    Procedure: Procedure(s):  RIGHT PLEUR-X CATHETER INSERTION    Medications prior to admission:   Prior to Admission medications    Medication Sig Start Date End Date Taking? Authorizing Provider   escitalopram (LEXAPRO) 10 MG tablet Take 1 tablet by mouth daily 5/15/20   Xander Aponte MD   hydrOXYzine (ATARAX) 10 MG tablet TAKE TWO TABLETS BY MOUTH 2 TIMES A DAY AS NEEDED FOR ANXIETY 20   Xander Aponte MD   pantoprazole (PROTONIX) 40 MG tablet Take 1 tablet by mouth daily 20   Xander Aponte MD   oxyCODONE-acetaminophen (PERCOCET) 5-325 MG per tablet Take 1 tablet by mouth every 8 hours as needed for Pain for up to 30 days. Intended supply: 3 days.  Take lowest dose possible to manage pain 20  Xander Aponte MD   citalopram (CELEXA) 20 MG tablet daily  20   Historical Provider, MD   OXYGEN Inhale into the lungs    Historical Provider, MD   metFORMIN (GLUCOPHAGE) 500 MG tablet TAKE ONE TABLET EVERY MORNING AND ONE TABLET EVERY EVENING 3/2/20   Xander Aponte MD   zolpidem (AMBIEN) 10 MG tablet TAKE ONE TABLET BY MOUTH NIGHTLY AS NEEDED FOR SLEEP FOR UP TO 90 DAYS 3/2/20 5/31/20  Xander Aponte MD   albuterol sulfate  (90 Base) MCG/ACT inhaler Inhale 2 puffs into the lungs every 6 hours as needed for Wheezing 10/8/19   Xander Aponte MD   blood glucose test strips (CONTOUR NEXT TEST) strip TEST ONCE DAILY DX: E11.9 10/3/19   Xander Aponte MD   pravastatin (PRAVACHOL) 80 MG tablet Take 1 tablet by mouth daily 10/3/19   Cruz Oleary MD   lisinopril (PRINIVIL;ZESTRIL) 40 MG tablet Take 1 tablet by mouth daily 19   Cruz Oleary MD   metoprolol succinate (TOPROL XL) 50 MG extended release Abida Keita MD at [facility] DX/THER SBST INTRLMNR CRV/THRC W/IMG GDN Bilateral 10/8/2018    BILATERAL LUMBAR THREE, LUMBAR FOUR, LUMBAR FIVE RADIOFREQUENCY ABLATION SITE CONFIRMED BY FLUOROSCOPY performed by Santosh Long MD at [address] [place] [address] N/A 8/27/2019    MIDLINE LUMBAR SPINAL CORD STIMULATOR TRIAL WITH [employer] performed by Carmina Herr MD at [place] [address]       Social History:    Social History     Tobacco Use    Smoking status: Current Every Day Smoker     Packs/day: 1.00     Years: 55.00     Pack years: 55.00     Types: Cigarettes    Smokeless tobacco: Never Used    Tobacco comment: 4-5 cigs a day   Substance Use Topics    Alcohol use: No                                Ready to quit: Not Answered  Counseling given: Not Answered  Comment: 4-5 cigs a day      Vital Signs (Current):   Vitals:    05/20/20 0500 05/20/20 0600 05/20/20 0801 05/20/20 0836   BP: 128/61 128/61 (!) 168/68    Pulse: 86 86 85    Resp: 19 28 30 (!) 32   Temp: 98.7 °F (37.1 °C) 98.8 °F (37.1 °C) 99.2 °F (37.3 °C)    TempSrc: Core Core Bladder    SpO2: 94% 94% 90% 95%   Weight:       Height:                                                  BP Readings from Last 3 Encounters:   05/20/20 (!) 168/68   05/06/20 125/65   05/04/20 106/67       NPO Status: Time of last liquid consumption: 2300                        Time of last solid consumption: 2300                        Date of last liquid consumption: 05/18/20                        Date of last solid food consumption: 05/18/20    BMI:   Wt Readings from Last 3 Encounters:   05/20/20 192 lb 0.3 oz (87.1 kg)   05/05/20 200 lb (90.7 kg)   05/05/20 200 lb (90.7 kg)     Body mass index is 26.78 kg/m².     CBC:   Lab Results   Component Value Date    WBC 10.2 05/19/2020    RBC 4.11 05/19/2020    HGB 11.6 05/19/2020    HCT 34.8 05/19/2020    MCV 84.8 05/19/2020    RDW 16.3 05/19/2020     05/19/2020

## 2020-05-20 NOTE — PRE SEDATION
MD   zolpidem (AMBIEN) 10 MG tablet TAKE ONE TABLET BY MOUTH NIGHTLY AS NEEDED FOR SLEEP FOR UP TO 90 DAYS 3/2/20 5/31/20  Theodora Shah MD   albuterol sulfate  (90 Base) MCG/ACT inhaler Inhale 2 puffs into the lungs every 6 hours as needed for Wheezing 10/8/19   Theodora Shah MD   blood glucose test strips (CONTOUR NEXT TEST) strip TEST ONCE DAILY DX: E11.9 10/3/19   Theodora Shah MD   pravastatin (PRAVACHOL) 80 MG tablet Take 1 tablet by mouth daily 10/3/19   Tomas Tobias MD   lisinopril (PRINIVIL;ZESTRIL) 40 MG tablet Take 1 tablet by mouth daily 9/24/19   Tomas Tobias MD   metoprolol succinate (TOPROL XL) 50 MG extended release tablet Take 1 tablet by mouth daily 9/9/19   Tomas Tobias MD   nitroGLYCERIN (NITROSTAT) 0.4 MG SL tablet Place 1 tablet under the tongue every 5 minutes as needed for Chest pain. 4/20/15   Theodora Shah MD   aspirin 81 MG EC tablet Take 81 mg by mouth daily. Indications: stopped for surgery    Historical Provider, MD         Pre-Sedation Documentation and Exam:   Vital signs have been reviewed (see flow sheet for vitals).     Mallampati Airway Assessment:  Mallampati Class II - (soft palate, fauces & uvula are visible)    Prior History of Anesthesia Complications:   none    ASA Classification:  Class 4 - A patient with an incapacitating systemic disease that is a constant threat to life    Sedation/ Anesthesia Plan:   intravenous sedation    Medications Planned:   midazolam (Versed) intravenously and fentanyl intravenously    Patient is an appropriate candidate for plan of sedation: yes    Electronically signed by Anju Best MD on 5/19/2020 at 10:50 PM

## 2020-05-20 NOTE — PROCEDURES
Bronchoscopy note    Patient with acute respiratory failure with hypoxemia, complete lung collapse, recent history of recent small cell lung cancer, patient was found to have debris and blockage of the right bronchi and given the patient's clinical status it was decided to do a therapeutic bronchoscopy to decrease patient's symptomatology and for that reason after informed consent, the endoscopy team was requested to come to the bedside, patient was given oropharyngeal analgesia with benzocaine after timeout, patient was given lidocaine for tracheobronchial analgesia, patient was given conscious sedation in the form of 2 mg of Versed and 50 mcg of fentanyl for the procedure, the bronchoscope was introduced through the mouth using a bite block, patient was found to have extensive thick mucous plugs in the posterior pharynx which was suctioned out, patient's vocal cords were normal, patient had some secretions the trachea which was suctioned out, patient had complete blockage of the right mainstem bronchus with mucous plugs and narrowing of the airways significantly because of tumor, patient had splaying of the leeroy, patient also had thick mucous plugs in the left lower lobe bronchus, the right tracheobronchial tree was therapeutically aspirated out using Mucomyst and saline, patient had extensive mucous plugs which were very thick and purulent in nature without any hemoptysis, , the left tracheobronchial tree was also therapeutically aspirated using Mucomyst and saline, patient tolerated the procedure well and did not have any apparent complications  Estimated blood loss was 0  Further management depending on patient's clinical status and the bronchoscopy results    Blair Jenkins MD

## 2020-05-21 LAB
APTT: 43.1 SEC (ref 24.2–36.2)
APTT: 48.6 SEC (ref 24.2–36.2)
CULTURE, RESPIRATORY: ABNORMAL
CULTURE, RESPIRATORY: ABNORMAL
GLUCOSE BLD-MCNC: 139 MG/DL (ref 70–99)
GLUCOSE BLD-MCNC: 154 MG/DL (ref 70–99)
GLUCOSE BLD-MCNC: 163 MG/DL (ref 70–99)
GLUCOSE BLD-MCNC: 207 MG/DL (ref 70–99)
GRAM STAIN RESULT: ABNORMAL
ORGANISM: ABNORMAL
PERFORMED ON: ABNORMAL
PLATELET # BLD: 452 K/UL (ref 135–450)

## 2020-05-21 PROCEDURE — 2580000003 HC RX 258: Performed by: INTERNAL MEDICINE

## 2020-05-21 PROCEDURE — 6370000000 HC RX 637 (ALT 250 FOR IP): Performed by: INTERNAL MEDICINE

## 2020-05-21 PROCEDURE — 2700000000 HC OXYGEN THERAPY PER DAY

## 2020-05-21 PROCEDURE — 85049 AUTOMATED PLATELET COUNT: CPT

## 2020-05-21 PROCEDURE — 6360000002 HC RX W HCPCS: Performed by: THORACIC SURGERY (CARDIOTHORACIC VASCULAR SURGERY)

## 2020-05-21 PROCEDURE — 6360000002 HC RX W HCPCS: Performed by: INTERNAL MEDICINE

## 2020-05-21 PROCEDURE — 2000000000 HC ICU R&B

## 2020-05-21 PROCEDURE — 94660 CPAP INITIATION&MGMT: CPT

## 2020-05-21 PROCEDURE — 94669 MECHANICAL CHEST WALL OSCILL: CPT

## 2020-05-21 PROCEDURE — 2500000003 HC RX 250 WO HCPCS: Performed by: INTERNAL MEDICINE

## 2020-05-21 PROCEDURE — 36415 COLL VENOUS BLD VENIPUNCTURE: CPT

## 2020-05-21 PROCEDURE — 99024 POSTOP FOLLOW-UP VISIT: CPT | Performed by: THORACIC SURGERY (CARDIOTHORACIC VASCULAR SURGERY)

## 2020-05-21 PROCEDURE — 94761 N-INVAS EAR/PLS OXIMETRY MLT: CPT

## 2020-05-21 PROCEDURE — 99233 SBSQ HOSP IP/OBS HIGH 50: CPT | Performed by: INTERNAL MEDICINE

## 2020-05-21 PROCEDURE — 85730 THROMBOPLASTIN TIME PARTIAL: CPT

## 2020-05-21 PROCEDURE — 94640 AIRWAY INHALATION TREATMENT: CPT

## 2020-05-21 PROCEDURE — 99232 SBSQ HOSP IP/OBS MODERATE 35: CPT | Performed by: INTERNAL MEDICINE

## 2020-05-21 RX ORDER — OXYCODONE HYDROCHLORIDE AND ACETAMINOPHEN 5; 325 MG/1; MG/1
1 TABLET ORAL EVERY 4 HOURS PRN
Status: DISCONTINUED | OUTPATIENT
Start: 2020-05-21 | End: 2020-05-23 | Stop reason: HOSPADM

## 2020-05-21 RX ORDER — DILTIAZEM HYDROCHLORIDE 120 MG/1
120 CAPSULE, COATED, EXTENDED RELEASE ORAL DAILY
Status: DISCONTINUED | OUTPATIENT
Start: 2020-05-21 | End: 2020-05-23 | Stop reason: HOSPADM

## 2020-05-21 RX ORDER — OXYCODONE HYDROCHLORIDE AND ACETAMINOPHEN 5; 325 MG/1; MG/1
2 TABLET ORAL EVERY 4 HOURS PRN
Status: DISCONTINUED | OUTPATIENT
Start: 2020-05-21 | End: 2020-05-23 | Stop reason: HOSPADM

## 2020-05-21 RX ADMIN — LEVALBUTEROL HYDROCHLORIDE 0.63 MG: 1.25 SOLUTION, CONCENTRATE RESPIRATORY (INHALATION) at 08:27

## 2020-05-21 RX ADMIN — PANTOPRAZOLE SODIUM 40 MG: 40 TABLET, DELAYED RELEASE ORAL at 08:51

## 2020-05-21 RX ADMIN — HEPARIN SODIUM 2000 UNITS: 1000 INJECTION INTRAVENOUS; SUBCUTANEOUS at 04:48

## 2020-05-21 RX ADMIN — METOPROLOL SUCCINATE 50 MG: 50 TABLET, EXTENDED RELEASE ORAL at 08:51

## 2020-05-21 RX ADMIN — IPRATROPIUM BROMIDE 0.5 MG: 0.5 SOLUTION RESPIRATORY (INHALATION) at 08:27

## 2020-05-21 RX ADMIN — LEVALBUTEROL HYDROCHLORIDE 0.63 MG: 1.25 SOLUTION, CONCENTRATE RESPIRATORY (INHALATION) at 20:22

## 2020-05-21 RX ADMIN — ASPIRIN 81 MG: 81 TABLET, COATED ORAL at 08:51

## 2020-05-21 RX ADMIN — INSULIN LISPRO 2 UNITS: 100 INJECTION, SOLUTION INTRAVENOUS; SUBCUTANEOUS at 08:52

## 2020-05-21 RX ADMIN — DILTIAZEM HYDROCHLORIDE 120 MG: 120 CAPSULE, COATED, EXTENDED RELEASE ORAL at 08:50

## 2020-05-21 RX ADMIN — OXYCODONE HYDROCHLORIDE AND ACETAMINOPHEN 1 TABLET: 5; 325 TABLET ORAL at 09:07

## 2020-05-21 RX ADMIN — Medication 10 ML: at 09:07

## 2020-05-21 RX ADMIN — APIXABAN 5 MG: 5 TABLET, FILM COATED ORAL at 21:36

## 2020-05-21 RX ADMIN — Medication 10 ML: at 21:36

## 2020-05-21 RX ADMIN — CITALOPRAM HYDROBROMIDE 20 MG: 20 TABLET ORAL at 08:50

## 2020-05-21 RX ADMIN — DIGOXIN 125 MCG: 250 INJECTION, SOLUTION INTRAMUSCULAR; INTRAVENOUS; PARENTERAL at 08:50

## 2020-05-21 RX ADMIN — APIXABAN 5 MG: 5 TABLET, FILM COATED ORAL at 12:22

## 2020-05-21 RX ADMIN — OXYCODONE HYDROCHLORIDE AND ACETAMINOPHEN 2 TABLET: 5; 325 TABLET ORAL at 15:20

## 2020-05-21 RX ADMIN — DILTIAZEM HYDROCHLORIDE 5 MG/HR: 5 INJECTION INTRAVENOUS at 00:34

## 2020-05-21 RX ADMIN — IPRATROPIUM BROMIDE 0.5 MG: 0.5 SOLUTION RESPIRATORY (INHALATION) at 20:22

## 2020-05-21 RX ADMIN — MUPIROCIN: 20 OINTMENT TOPICAL at 08:51

## 2020-05-21 RX ADMIN — MUPIROCIN: 20 OINTMENT TOPICAL at 21:36

## 2020-05-21 RX ADMIN — INSULIN LISPRO 1 UNITS: 100 INJECTION, SOLUTION INTRAVENOUS; SUBCUTANEOUS at 21:40

## 2020-05-21 ASSESSMENT — PAIN SCALES - GENERAL
PAINLEVEL_OUTOF10: 5
PAINLEVEL_OUTOF10: 4
PAINLEVEL_OUTOF10: 7
PAINLEVEL_OUTOF10: 2
PAINLEVEL_OUTOF10: 0
PAINLEVEL_OUTOF10: 6

## 2020-05-21 ASSESSMENT — PAIN DESCRIPTION - LOCATION
LOCATION: SHOULDER;NECK
LOCATION: SHOULDER
LOCATION: SHOULDER

## 2020-05-21 ASSESSMENT — PAIN DESCRIPTION - ONSET: ONSET: GRADUAL

## 2020-05-21 ASSESSMENT — PAIN DESCRIPTION - FREQUENCY: FREQUENCY: CONTINUOUS

## 2020-05-21 ASSESSMENT — PAIN DESCRIPTION - DESCRIPTORS: DESCRIPTORS: TIGHTNESS;TENDER

## 2020-05-21 ASSESSMENT — PAIN DESCRIPTION - PROGRESSION: CLINICAL_PROGRESSION: NOT CHANGED

## 2020-05-21 ASSESSMENT — PAIN DESCRIPTION - ORIENTATION
ORIENTATION: RIGHT
ORIENTATION: RIGHT

## 2020-05-21 ASSESSMENT — PAIN DESCRIPTION - DIRECTION: RADIATING_TOWARDS: SIDE

## 2020-05-21 ASSESSMENT — PAIN DESCRIPTION - PAIN TYPE
TYPE: SURGICAL PAIN
TYPE: CHRONIC PAIN;SURGICAL PAIN

## 2020-05-21 ASSESSMENT — PAIN - FUNCTIONAL ASSESSMENT: PAIN_FUNCTIONAL_ASSESSMENT: PREVENTS OR INTERFERES SOME ACTIVE ACTIVITIES AND ADLS

## 2020-05-21 NOTE — PROGRESS NOTES
05/21/20 0448   Oxygen Therapy/Pulse Ox   O2 Therapy Oxygen humidified   O2 Device Heated high flow cannula  (Simultaneous filing. User may not have seen previous data.)   O2 Flow Rate (L/min) 30 L/min  (Simultaneous filing. User may not have seen previous data.)   FiO2  70 %  (Simultaneous filing. User may not have seen previous data.)   Resp 23  (Simultaneous filing. User may not have seen previous data.)   SpO2 94 %  (Simultaneous filing.  User may not have seen previous data.)   Humidification Source Heated wire   Humidification Temp 37

## 2020-05-21 NOTE — PROGRESS NOTES
awake and oriented. PERRL. Speech fluent      Results:  CBC:   Recent Labs     05/18/20  1830 05/19/20  0232   WBC  --  10.2   HGB 13.7 11.6*   HCT  --  34.8*   MCV  --  84.8   PLT  --  459*     BMP:   Recent Labs     05/19/20 0232   *   K 5.1   CL 94*   CO2 27   BUN 63*   CREATININE 0.7*     LIVER PROFILE:   Recent Labs     05/19/20 0232   AST 51*   ALT 11   BILITOT 0.4   ALKPHOS 91       Imaging:  I have reviewed radiology images personally. XR CHEST PORTABLE   Final Result   Unchanged total opacification of the right hemithorax         CT CHEST WO CONTRAST   Final Result   1. Obstruction of the right mainstem bronchus proximally with retained   secretions with resultant complete right lung atelectasis. Large right   pleural effusion, increased from the comparison study. Pleural implants   consistent with metastatic disease are again noted. 2. Redemonstration of extensive mediastinal adenopathy. 3. Mild emphysematous changes on the left. Mild dependent atelectasis. No   acute infiltrate. 4. Heterogeneity of the hepatic parenchyma suggesting metastatic involvement. XR CHEST PORTABLE   Final Result   Increasing opacification of the right hemithorax, likely a combination of   increased pleural fluid and increased lung consolidation. Little aerated   lung on the right remains           Results for Juan Carlos Nix (MRN [id number]) as of 5/20/2020 20:26   Ref. Range 5/18/2020 21:28 5/19/2020 07:44 5/20/2020 07:59   T3, Total Latest Ref Range: 0.80 - 2.00 ng/mL 0.79 (L)  0.77 (L)   T4, Total Latest Ref Range: 4.5 - 10.9 ug/dL   6.8   TSH Latest Ref Range: 0.27 - 4.20 uIU/mL 0.10 (L)     T4 Free Latest Ref Range: 0.9 - 1.8 ng/dL 1.4 1.4    TSH Reflex FT4 Latest Ref Range: 0.27 - 4.20 uIU/mL  0.08 (L)      Results for Juan Carlos Nix (MRN [id number]) as of 5/20/2020 20:26   Ref.  Range 5/19/2020 12:15   CULTURE, FUNGUS Unknown Rpt   CULTURE, RESPIRATORY Unknown Rpt   Gram Stain Result Xopenex   · Patient does not have any acute bronchospasm requiring patient to be given any IV steroids  · CT surgery consult for possible pleural biopsy and Pleurx catheter placement-tentatively this morning   · Heparin infusion is being started by cardiology -needs to be d/isai 4-6 hours before the surgery time -discussed with IM   · Consider repeat echocardiogram if deemed appropriate  · TSH is decreased -will send T3 and T4 levels   · PUD prophylaxis as per IM       Case d/w ICU team and CT surgery         Patient has guarded long-term prognosis given that patient has metastatic small cell lung cancer-patient wants to be full code at this time-would recommend palliative care consult if deemed appropriate to discuss again about the goals of care and CODE STATUS            Electronically signed by:  Soco Mcclain MD    5/20/2020    8:23 PM.

## 2020-05-21 NOTE — PROGRESS NOTES
05/20/20 2734   Oxygen Therapy/Pulse Ox   O2 Therapy Oxygen humidified   O2 Device Heated high flow cannula   O2 Flow Rate (L/min) 40 L/min   FiO2  60 %  (increased to 70%)   Resp (!) 32   SpO2 (!) 87 %   Humidification Source Heated wire   Humidification Temp 37

## 2020-05-21 NOTE — PROGRESS NOTES
 EPIDURAL STEROID INJECTION Bilateral 12/17/2018    BILATERAL LUMBAR THREE FOUR EPIDURAL STEROID INJECTION SITE CONFIRMED BY FLUOROSCOPY performed by Lauren Odom MD at [facility] DX/THER SBST INTRLMNR CRV/THRC W/IMG GDN Right 7/31/2018    RIGHT LUMBAR FIVE SIX EPIDURAL STEROID INJECTION SITE CONFIRMED BY FLUOROSCOPY performed by Lauren Odom MD at [facility] DX/THER SBST INTRLMNR CRV/THRC W/IMG GDN Bilateral 9/18/2018    RIGHT LUMBAR THREE, LUBMAR FOUR, LUMBAR FIVE MEDIAL BRANCH BLOCK SITE CONFIRMED BY FLUOROSCOPY performed by Lauren Odom MD at [facility] DX/THER SBST INTRLMNR CRV/THRC W/IMG GDN Bilateral 10/8/2018    BILATERAL LUMBAR THREE, LUMBAR FOUR, LUMBAR FIVE RADIOFREQUENCY ABLATION SITE CONFIRMED BY FLUOROSCOPY performed by Lauren Odom MD at [address] [place] [address] N/A 8/27/2019    MIDLINE LUMBAR SPINAL CORD STIMULATOR TRIAL WITH [employer] performed by Ella Sanchez MD at [place] [address]       Objective:   /60   Pulse 98   Temp 98.6 °F (37 °C) (Axillary)   Resp 24   Ht 5' 11\" (1.803 m)   Wt 180 lb 8.9 oz (81.9 kg)   SpO2 97%   BMI 25.18 kg/m²       Intake/Output Summary (Last 24 hours) at 5/21/2020 4571  Last data filed at 5/21/2020 0600  Gross per 24 hour   Intake 594.69 ml   Output 2542 ml   Net -1947.31 ml       TELEMETRY:  Aflutter rate controlled  Physical Exam:  General Appearance:  Alert, cooperative, Mild resp distress, appears stated age   Head:  Normocephalic, without obvious abnormality, atraumatic   Eyes:  PERRL, conjunctiva/corneas clear         Nose: Nares normal, no drainage or sinus tenderness   Throat: Lips, mucosa, and tongue normal   Neck: Supple, symmetrical, trachea midline, no adenopathy, thyroid: not enlarged, symmetric, no tenderness/mass/nodules, no carotid bruit or JVD         Lungs:   Clear to auscultation on left.  Dullness and absent ALKPHOS 91     PT/INR: No results for input(s): PROTIME, INR in the last 72 hours. APTT:   Recent Labs     05/20/20  0032 05/20/20  0759 05/21/20  0315   APTT 43.7* 32.7 43.1*     BNP:  No results for input(s): BNP in the last 72 hours. IMAGING:  Echo doppler of heart 5.19.20   Summary   The left ventricular systolic function is moderately reduced with an   ejection fraction of 35-40 %. There is akinesis of the apical septal and apical anterior walls. There is mild concentric left ventricular hypertrophy. Elevated left ventricular filling pressure. Septal E/e'= 12.1 IVRT = 56 msec . Left ventricular cavity size is normal.   Tricuspid valve is structurally normal.   Mild tricuspid regurgitation. Systolic pulmonary artery pressure (SPAP) is estimated at 42 mmHg consistent   with mild pulmonary hypertension (Right atrial pressure of 8 mmHg). Small anterior pericardial effusion noted. There is a large right pleural effusion in subcostal views. Definity echo contrast was used no intracardiac thrombus or mass was seen. CT chest 5.18.20  Impression:   Increasing opacification of the right hemithorax, likely a combination of  increased pleural fluid and increased lung consolidation.  Little aerated  lung on the right remains   Impression:   1. Obstruction of the right mainstem bronchus proximally with retained  secretions with resultant complete right lung atelectasis.  Large right  pleural effusion, increased from the comparison study.  Pleural implants  consistent with metastatic disease are again noted. 2. Redemonstration of extensive mediastinal adenopathy. 3. Mild emphysematous changes on the left.  Mild dependent atelectasis.  No  acute infiltrate. 4. Heterogeneity of the hepatic parenchyma suggesting metastatic involvement.    EKG 5.18.20  Atrial flutter with variable A-V blockLeft axis deviationLeft ventricular hypertrophy with repolarization abnormalityAnteroseptal infarct (cited on

## 2020-05-21 NOTE — PROGRESS NOTES
1759: Cat completed. BS: 154. Patient was in a deep sleep and did not want to eat yet. Insulin was held. FiO2 decreased to 55%. O2 sats 96%    1850: Patient is still resting in bed and not interested in eating dinner. FiO2 decreased to 50%. O2 sats remain at 96%.

## 2020-05-21 NOTE — PLAN OF CARE
Palliative Care Progress Note  Palliative Care Admit date:    Advance Directives:  Have reviewed the components of resuscitation w/ pt and he was clear to say \"I don't want those things, I only want to be comfortable. \"   Pt requests that code status be amended to reflect DNR/DNI    Plan of care/goals:   Met w/ pt @ BS, no family present. He has a good understanding of his dx and extent of cancer. As we discussed his current plan of care for aggressive, life-prolonging interventions, pt shook his head and said \"no, I don't want to keep at this, I only want to focus of being comfortable and t home with my family. \"  Pt has asked that his wife come to the BS so that he can discuss the option of hospice w/ her. Writer offered to pull her up on face time but he preferred to \"do this in person. \"  Hoping vapotherm can be furthered weaned in order to even contemplate pt going home to die which is his expressed desire. Plan:  Amended code to 'limited' to reflect DNR/DNI per pts expressed wishes. Also, Mrs. Radha Espana will be here tomorrow morning @ 1030 to meet w/ pt who has specifically asked that she come to his BS to discuss hospice involvement. Have d/w pts wife, apprised hospitalist & shift RN.         Reason for consult:    ___ Advance Care Planning  _X_ Transition of Care Planning  _X_ Psychosocial/Spiritual Support  ___ Symptom Management                                                                                          Tristin Rothman RN

## 2020-05-21 NOTE — PROGRESS NOTES
Progress Note    S/P RIGHT pleurX    CC: sob    Hospital Course:  5/21 - laying in bed    Vital Signs:                                                 /60   Pulse 98   Temp 98.6 °F (37 °C) (Axillary)   Resp 29   Ht 5' 11\" (1.803 m)   Wt 180 lb 8.9 oz (81.9 kg)   SpO2 98%   BMI 25.18 kg/m²  O2 Flow Rate (L/min): 30 L/min        Admission Weight: 192 lb 7.4 oz (87.3 kg)      Vent Settings:  Vent Information  FiO2 : (S) 65 %  SpO2: 98 %  SpO2/FiO2 ratio: 149.23  Humidification Source: Heated wire  Humidification Temp: 37  Humidification Temp Measured: 37       I/O:      Intake/Output Summary (Last 24 hours) at 5/21/2020 0925  Last data filed at 5/21/2020 0600  Gross per 24 hour   Intake 594.69 ml   Output 2542 ml   Net -1947.31 ml     Chest Tube:     O/E  GEN: aao x 3  HEENT: unremkarable  CV: reg, wound c/d/i  Pulm: decreased BS on R side  Abd: soft, nt, nd, no peritoneal signs  Ext: warm, edema, wound c/d/i    Data Review:  CBC:   Recent Labs     05/18/20  1830 05/19/20  0232 05/21/20  0315   WBC  --  10.2  --    HGB 13.7 11.6*  --    HCT  --  34.8*  --    MCV  --  84.8  --    PLT  --  459* 452*     BMP:   Recent Labs     05/19/20  0232   *   K 5.1   CL 94*   CO2 27   BUN 63*   CREATININE 0.7*   CALCIUM 9.9     Cardiac Enzymes:   Recent Labs     05/18/20  2128 05/19/20  0232 05/19/20  0744   TROPONINI 0.02* 0.01 0.01     PT/INR: No results for input(s): PROTIME, INR in the last 72 hours. APTT:   Recent Labs     05/20/20  0032 05/20/20  0759 05/21/20  0315   APTT 43.7* 32.7 43.1*       Assessment/Plan:  No acute events   Overnight pleurX with 1.6L of straw-colored fluid  Leave to suction today, can cap pleurX in AM  Ok to d/c from my standpoint tomorrow  dispo plan: drain pleurX every other day, case mx updated.   F/u with my office in 3 week post discharge       Gil Mayes MD  Cardiothoracic Surgery  5/21/2020  9:25 AM

## 2020-05-21 NOTE — PROGRESS NOTES
INPATIENT PULMONARY CRITICAL CARE PROGRESS NOTE      Reason for visit    Acute on chronic resp failure, massive right-sided pleural effusion    SUBJECTIVE: Patient underwent Pleurx catheter placement yesterday and about 1.6 L of fluid came out overnight, patient continues to be on Vapotherm oxygenation and patient was on 70% oxygen with saturation of 97% when seen this morning, patient was not having increased work of breathing or use of accessory muscle respiration when seen, patient is essentially afebrile this morning, patient was hemodynamically maintained, patient's blood sugar control was better, patient continues to be on Cardizem drip, patient's rhythm has changed to a sinus rhythm on the monitor, patient urine output was adequate overnight with cumulative fluid balance of -3 L, no other pertinent review of system of concern        Physical Exam:  Blood pressure (!) 120/52, pulse 89, temperature 99 °F (37.2 °C), temperature source Axillary, resp. rate (!) 36, height 5' 11\" (1.803 m), weight 180 lb 8.9 oz (81.9 kg), SpO2 94 %.'     Constitutional: In no  respiratory distress when seen  HENT:  Oropharynx is clear and moist. No thyromegaly. Eyes:  Conjunctivae are normal. Pupils equal, round, and reactive to light. No scleral icterus. Neck: . No tracheal deviation present. No obvious thyroid mass. Cardiovascular: S1S2 RR, normal heart sounds. No right ventricular heave. No lower extremity edema. Pulmonary/Chest: No wheezes. Scattered rales. Increased chest congestion Chest wall is  dull to percussion on right side. (-) accessory muscle usage, no stridor. Decreased BSI ;port(+), right sided Pleurx catheter with chest tube present  Abdominal: Soft. Bowel sounds present. No distension or hernia. No tenderness. Musculoskeletal: No cyanosis. No clubbing. No obvious joint deformity. Lymphadenopathy: No cervical or supraclavicular adenopathy. Skin: Skin is warm and dry.  No rash or nodules on the suspicious for malignancy - see       comment.     - Very scant cellularity. 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  The submitted pleural fluid specimen shows very  scant cellularity, consisting overwhelmingly of fibrin with scattered  inflammatory cells.  Very rare epithelial cells with high nucleus to  cytoplasmic ratio and hyperchromatic nuclei are seen.  These cells are  better appreciated on the accompanying immediate spin slide which also  shows scattered mitotic activity among these cells.  Immunohistochemical  staining was performed to better characterize these cells.  The cells in  question show positive expression for pancytokeratin AE1/AE3 and  Synaptophysin, which further supports the suspicion of neoplastic cell  involvement.  Overall, the submitted pleural fluid specimen contains very  rare atypical cells that are suspicious for malignancy.  The very scant  number of cells present hinders the definitive characterization of these  cells.  Please correlate with clinical and radiologic findings. Assessment:  Active Problems:    Type 2 diabetes mellitus with complication (HCC)    Mediastinal lymphadenopathy    Shortness of breath    Small cell lung cancer (HCC)    Malignant pleural effusion    Acute on chronic respiratory failure with hypoxia (HCC)    Pleural metastasis (HCC)    Hepatic metastasis (HCC)    Lung collapse    Atrial fibrillation with RVR (HCC)    Elevated lactic acid level    Cardiomyopathy (HCC)    Diastolic dysfunction    Restrictive lung disease  Resolved Problems:    * No resolved hospital problems.  *          Plan:   · Oxygen supplementation to keep saturation between 90 to 94% only-patient continues to be on vapotherm oxygenation   · Patient has a large pleural effusion which has

## 2020-05-21 NOTE — PROGRESS NOTES
Laterality Date    BRONCHOSCOPY Bilateral 10/09/2019    EBUS    BRONCHOSCOPY N/A 10/9/2019    EBUS NF W/ANES. (07:00) NO LABS performed by Natasha Snell MD at [address]  10/9/2019    BRONCHOSCOPY ALVEOLAR LAVAGE performed by Natasha Snell MD at [address]  10/9/2019    BRONCHOSCOPY/TRANSBRONCHIAL NEEDLE BIOPSY performed by Natasha Snell MD at [address]  10/9/2019    BRONCHOSCOPY/TRANSBRONCHIAL LUNG BIOPSY ADDL LOBE performed by Natasha Snell MD at [address] N/A 4/22/2020    EBUS NF W/ANES.  (10:30) NO LABS performed by Natasha Snell MD at [address]  4/22/2020    BRONCHOSCOPY ALVEOLAR LAVAGE performed by Natasha Snell MD at [address]  4/22/2020    BRONCHOSCOPY BIOPSY BRONCHUS performed by Natasha Snell MD at [address]  4/22/2020    BRONCHOSCOPY BRUSHINGS performed by Natasha Snell MD at [address]  4/22/2020    BRONCHOSCOPY/TRANSBRONCHIAL NEEDLE BIOPSY performed by Natasha Snell MD at [address]  4/22/2020    BRONCHOSCOPY/TRANSBRONCHIAL NEEDLE BIOPSY ADDL LOBE performed by Natasha Snell MD at [address] N/A 5/19/2020    BRONCHOSCOPY ALVEOLAR LAVAGE performed by Arelis Pillai MD at [address]  5/19/2020    BRONCHOSCOPY THERAPUTIC ASPIRATION INITIAL performed by Arelis Pillai MD at [facility]      stents x4    CIRCUMCISION      CORONARY ARTERY BYPASS GRAFT MAZE PROCEDURE Right 5/20/2020    ULTRASOUND GUIDED RIGHT PLEUR-X CATHETER INSERTION performed by Dirk Severino MD at [address] Bilateral 12/17/2018    BILATERAL LUMBAR THREE FOUR EPIDURAL STEROID INJECTION SITE CONFIRMED BY FLUOROSCOPY performed by Santosh Long MD at [facility]

## 2020-05-21 NOTE — PROGRESS NOTES
Hospitalist Progress Note    Date of Admission: 5/18/2020    Chief Complaint: Shortness of breath    Hospital Course: 70 y.o. male who presented to [facility] with with shortness of breath. He has a history of small cell carcinoma and being seen by oncologist.  He was recently admitted to [facility] with respiratory failure and discharged home on 2 L oxygen. He experienced more and more short of breath lately and he ended up needing more oxygen lately. He was seen at [facility] ER and found to have large pleural effusion and was transferred over here for for possible thoracentesis. In route he developed A. fibwith rapid ventricular rate and was very diaphoretic. As soon as patient reached hospital rapid response was called. He was found to be on A. fib with rapid ventricular rate. He was treated with Cardizem and transferred to the ICU for further management. He underwent urgent thoracentesis with removal of 600 mils of fluid per pulmonology. The patient reports that he is being followed by oncology at [facility]. He had his Mediport placed last week and has had 3 radiation treatments so far. He was supposed to start chemotherapy on 5/18/2020 although ended up in the hospital instead. Subjective: On Vapotherm. Reports overall improvement in SOB. Less chest pain today. Afib rate controlled. Labs:   Recent Labs     05/18/20  1830 05/19/20  0232 05/21/20  0315   WBC  --  10.2  --    HGB 13.7 11.6*  --    HCT  --  34.8*  --    PLT  --  459* 452*     Recent Labs     05/19/20  0232   *   K 5.1   CL 94*   CO2 27   BUN 63*   CREATININE 0.7*   CALCIUM 9.9     Recent Labs     05/19/20  0232   AST 51*   ALT 11   BILITOT 0.4   ALKPHOS 91     No results for input(s): INR in the last 72 hours.     Physical Exam Performed:    BP (!) 111/46   Pulse 89   Temp 99 °F (37.2 °C) (Axillary)   Resp 16   Ht 5' 11\" (1.803 m)   Wt 180 lb 8.9 oz (81.9 kg)   SpO2 95% BMI 25.18 kg/m²     General appearance: NAD, appears stated age and cooperative. HEENT:  Normal cephalic, atraumatic without obvious deformity. Pupils equal, round, and reactive to light. Extra ocular muscles intact. Conjunctivae/corneas clear. Neck: Supple, with full range of motion. No jugular venous distention. Trachea midline. Respiratory: Normal respiratory effort. Diminished on the right, otherwise clear. Right PleurX. Cardiovascular: Irregular rhythm, normal rate, with normal S1/S2 without murmurs, rubs or gallops. Abdomen: Soft, non-tender, non-distended with normal bowel sounds. Musculoskeletal:  No clubbing, cyanosis or edema bilaterally. Full range of motion without deformity. Skin: Skin color, texture, turgor normal.  No rashes or lesions. Neurologic:  Neurovascularly intact without any focal sensory/motor deficits. Cranial nerves: II-XII intact, grossly non-focal.  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]    Hepatic metastasis (HCC) [C78.7]    Lung collapse [J98.19]    Atrial fibrillation with RVR (HCC) [I48.91]    Elevated lactic acid level [R79.89]    Cardiomyopathy ([facility] [address] [U81.2]    Diastolic dysfunction [B20.59]    Restrictive lung disease [J98.4]    Small cell lung cancer (HCC) [C34.90]    Shortness of breath [R06.02]    Mediastinal lymphadenopathy [R59.0]    Type 2 diabetes mellitus with complication (HCC) [O94.4]     Acute on chronic hypoxic respiratory failure: Secondary to small cell lung cancer with likely bronchial obstruction, mucous plugging and large right-sided malignant pleural effusion. PleurX catheter placed.  s/p bronchoscopy for mucous plugging. Continue high flow nasal cannula and wean O2 as tolerated. Pulmonology following. Right-sided malignant pleural effusion: Secondary to small cell lung cancer. Status post thoracentesis with 600 mL removal.  CT surgery consulted; s/p Pleurx catheter with 2.5 L fluid removed initially. Continue Pleurx to suction for now. Small cell lung cancer: Followed by oncology at [facility]. He recently completed his first 3 radiation treatments. He had a Mediport placed last week. Chemotherapy was planned for 5/18/2020 but missed due to his hospitalization. Atrial fibrillation with rapid ventricular rate: Uncontrolled. Cardiology consulted. Continue digoxin and Cardizem drip. TTE shows LVEF 35 to 40% which is similar to his prior. No significant valvular disease. Cardiology recommends anticoagulation. Can change heparin to Eliquis. Chronic combined diastolic and systolic CHF-cardiology following. Is TTE shows LVEF 35 to 40% which is similar to his prior. Continue medical management and monitor. Acute kidney injury-possibly secondary to prerenal as well as nephrotoxic medications, encourage p.o. intake hold lisinopril and metformin, monitor BMP. CAD stent-on aspirin beta-blocker and statin    Diabetes-hold metformin monitor blood sugar with low correction scale insulin.      COPD-not in exacerbation continue home inhalers    Hyperkalemia-not quite sure is a hemolyzed sample or real-repeat stat BMP    Leukocytosis-possibly secondary to hemoconcentration, started on empirical antibiotic for possible pneumonia, monitor closely    Poor appetite-consider dietitian evaluation,    DVT Prophylaxis: Eliquis  Diet: DIET CARB CONTROL; Carb Control: 5 carb choices (75 gms)/meal  Code Status: Full Code  PT/OT Eval Status: NA    Dispo -ICU    Ayo Silveira MD

## 2020-05-22 LAB
ANION GAP SERPL CALCULATED.3IONS-SCNC: 8 MMOL/L (ref 3–16)
BUN BLDV-MCNC: 37 MG/DL (ref 7–20)
CALCIUM SERPL-MCNC: 9.9 MG/DL (ref 8.3–10.6)
CHLORIDE BLD-SCNC: 96 MMOL/L (ref 99–110)
CO2: 31 MMOL/L (ref 21–32)
CREAT SERPL-MCNC: 0.7 MG/DL (ref 0.8–1.3)
GFR AFRICAN AMERICAN: >60
GFR NON-AFRICAN AMERICAN: >60
GLUCOSE BLD-MCNC: 125 MG/DL (ref 70–99)
GLUCOSE BLD-MCNC: 130 MG/DL (ref 70–99)
GLUCOSE BLD-MCNC: 252 MG/DL (ref 70–99)
GLUCOSE BLD-MCNC: 253 MG/DL (ref 70–99)
PERFORMED ON: ABNORMAL
POTASSIUM SERPL-SCNC: 5.3 MMOL/L (ref 3.5–5.1)
SODIUM BLD-SCNC: 135 MMOL/L (ref 136–145)

## 2020-05-22 PROCEDURE — 97530 THERAPEUTIC ACTIVITIES: CPT

## 2020-05-22 PROCEDURE — 6370000000 HC RX 637 (ALT 250 FOR IP): Performed by: INTERNAL MEDICINE

## 2020-05-22 PROCEDURE — 99233 SBSQ HOSP IP/OBS HIGH 50: CPT | Performed by: INTERNAL MEDICINE

## 2020-05-22 PROCEDURE — 2060000000 HC ICU INTERMEDIATE R&B

## 2020-05-22 PROCEDURE — 99024 POSTOP FOLLOW-UP VISIT: CPT | Performed by: THORACIC SURGERY (CARDIOTHORACIC VASCULAR SURGERY)

## 2020-05-22 PROCEDURE — 94761 N-INVAS EAR/PLS OXIMETRY MLT: CPT

## 2020-05-22 PROCEDURE — 6370000000 HC RX 637 (ALT 250 FOR IP): Performed by: THORACIC SURGERY (CARDIOTHORACIC VASCULAR SURGERY)

## 2020-05-22 PROCEDURE — 99232 SBSQ HOSP IP/OBS MODERATE 35: CPT | Performed by: INTERNAL MEDICINE

## 2020-05-22 PROCEDURE — 97110 THERAPEUTIC EXERCISES: CPT

## 2020-05-22 PROCEDURE — 36415 COLL VENOUS BLD VENIPUNCTURE: CPT

## 2020-05-22 PROCEDURE — 6360000002 HC RX W HCPCS: Performed by: THORACIC SURGERY (CARDIOTHORACIC VASCULAR SURGERY)

## 2020-05-22 PROCEDURE — 97163 PT EVAL HIGH COMPLEX 45 MIN: CPT

## 2020-05-22 PROCEDURE — 6360000002 HC RX W HCPCS: Performed by: INTERNAL MEDICINE

## 2020-05-22 PROCEDURE — 2580000003 HC RX 258: Performed by: INTERNAL MEDICINE

## 2020-05-22 PROCEDURE — 97535 SELF CARE MNGMENT TRAINING: CPT

## 2020-05-22 PROCEDURE — 2700000000 HC OXYGEN THERAPY PER DAY

## 2020-05-22 PROCEDURE — 97166 OT EVAL MOD COMPLEX 45 MIN: CPT

## 2020-05-22 PROCEDURE — 80048 BASIC METABOLIC PNL TOTAL CA: CPT

## 2020-05-22 PROCEDURE — 94640 AIRWAY INHALATION TREATMENT: CPT

## 2020-05-22 PROCEDURE — 94660 CPAP INITIATION&MGMT: CPT

## 2020-05-22 PROCEDURE — 94669 MECHANICAL CHEST WALL OSCILL: CPT

## 2020-05-22 RX ORDER — CHOLECALCIFEROL (VITAMIN D3) 125 MCG
5 CAPSULE ORAL NIGHTLY
Status: DISCONTINUED | OUTPATIENT
Start: 2020-05-22 | End: 2020-05-23 | Stop reason: HOSPADM

## 2020-05-22 RX ORDER — MEGESTROL ACETATE 20 MG/1
20 TABLET ORAL DAILY
Status: DISCONTINUED | OUTPATIENT
Start: 2020-05-22 | End: 2020-05-23 | Stop reason: HOSPADM

## 2020-05-22 RX ORDER — DIGOXIN 125 MCG
125 TABLET ORAL DAILY
Status: DISCONTINUED | OUTPATIENT
Start: 2020-05-23 | End: 2020-05-23 | Stop reason: HOSPADM

## 2020-05-22 RX ADMIN — MELATONIN TAB 5 MG 5 MG: 5 TAB at 21:37

## 2020-05-22 RX ADMIN — DILTIAZEM HYDROCHLORIDE 120 MG: 120 CAPSULE, COATED, EXTENDED RELEASE ORAL at 14:23

## 2020-05-22 RX ADMIN — ACETAMINOPHEN 650 MG: 325 TABLET ORAL at 16:35

## 2020-05-22 RX ADMIN — PANTOPRAZOLE SODIUM 40 MG: 40 TABLET, DELAYED RELEASE ORAL at 09:26

## 2020-05-22 RX ADMIN — ASPIRIN 81 MG: 81 TABLET, COATED ORAL at 09:27

## 2020-05-22 RX ADMIN — LEVALBUTEROL HYDROCHLORIDE 1.25 MG: 1.25 SOLUTION, CONCENTRATE RESPIRATORY (INHALATION) at 19:44

## 2020-05-22 RX ADMIN — APIXABAN 5 MG: 5 TABLET, FILM COATED ORAL at 20:56

## 2020-05-22 RX ADMIN — Medication 10 ML: at 20:56

## 2020-05-22 RX ADMIN — DIGOXIN 125 MCG: 250 INJECTION, SOLUTION INTRAMUSCULAR; INTRAVENOUS; PARENTERAL at 09:29

## 2020-05-22 RX ADMIN — INSULIN LISPRO 3 UNITS: 100 INJECTION, SOLUTION INTRAVENOUS; SUBCUTANEOUS at 16:36

## 2020-05-22 RX ADMIN — CITALOPRAM HYDROBROMIDE 20 MG: 20 TABLET ORAL at 09:27

## 2020-05-22 RX ADMIN — IPRATROPIUM BROMIDE 0.5 MG: 0.5 SOLUTION RESPIRATORY (INHALATION) at 19:44

## 2020-05-22 RX ADMIN — APIXABAN 5 MG: 5 TABLET, FILM COATED ORAL at 09:27

## 2020-05-22 RX ADMIN — IPRATROPIUM BROMIDE 0.5 MG: 0.5 SOLUTION RESPIRATORY (INHALATION) at 08:58

## 2020-05-22 RX ADMIN — LEVALBUTEROL HYDROCHLORIDE 0.63 MG: 1.25 SOLUTION, CONCENTRATE RESPIRATORY (INHALATION) at 08:57

## 2020-05-22 RX ADMIN — METOPROLOL SUCCINATE 50 MG: 50 TABLET, EXTENDED RELEASE ORAL at 16:35

## 2020-05-22 RX ADMIN — MEGESTROL ACETATE 20 MG: 20 TABLET ORAL at 09:26

## 2020-05-22 ASSESSMENT — PAIN SCALES - GENERAL
PAINLEVEL_OUTOF10: 7
PAINLEVEL_OUTOF10: 0

## 2020-05-22 NOTE — PLAN OF CARE
Palliative Care Progress Note  Palliative Care Admit date:  5/20/2020    Plan of care/goals:  pts wife and sister are present to speak w/ pt who is sitting up in chair but quite obviously weak. Pt has been clear to express his wish for hospice but, primarily, to \"get home. \"  Unfortunately, Mrs. Rosita Jean has informed pt that she cannot care for pt at home. Pt has grudgingly agreed to [facility] placement though this is an appropriate level of care despite his desire to go right home. They first wanted to meet w/ [facility] given their [facility] is so close to their home. However, the [facility] is closed due to covid and they are unsure of when it will open. In talking further w/ Mrs. Rosita Jean and her sister, they have indicated the desire for ref to be called to [facility] given their [facility] [employer] and fact that the sister (where spouse is staying) lives in [facility]. D/w shift RN & hospitalist    Plan:  HOC called w/ referral and will apprise us of time they will visit.   Pt being moved to C4 and Vapo being switched to high flow, will need to verify how pt tolerates          Reason for consult:    ___ Advance Care Planning  _X_ Transition of Care Planning  ___ Psychosocial/Spiritual Support  ___ Symptom Management                                                                                                                        Dominic Raman RN

## 2020-05-22 NOTE — PLAN OF CARE
Palliative Care Progress Note  Palliative Care Admit date:  5/20/2020    Plan:   HOC met w/ spouse and pt to discuss d/c w/ hospice. All are in agreement for pts transfer to the hospice [facility]) tomorrow, 5/23. HOC will return Saturday to facilitate transfer. Physician has already signed the [place] DNR    I have returned to pts room on C4, he is sleeping and his spouse has left.          Reason for consult:    ___ Advance Care Planning  _X_ Transition of Care Planning  ___ Psychosocial/Spiritual Support  ___ Symptom Management                                                                                                              Hans Mancia RN

## 2020-05-22 NOTE — PROGRESS NOTES
Physical Therapy    Facility/Department: [facility] B2 - ICU  Initial Assessment    NAME: Kings Tariq  : 1945  MRN: [id number]    Date of Service: 2020    Discharge Recommendations:  24 hour supervision or assist, Home with Home health PT   PT Equipment Recommendations  Equipment Needed: (potenital w/c pending progress while IP)    Assessment   Body structures, Functions, Activity limitations: Decreased functional mobility ; Decreased balance;Decreased ROM; Decreased endurance;Decreased strength;Decreased posture  Assessment: 70 y.o. male recently diagnosed with small cell lung cancer following EBUS .  presented to [facility] ER for SOB, transferred to [facility] [place]. Had pleurex catheter procedure . Pt reports at baseline is home bound and able to transfer ambulate short distances around the house I without AD. Reports wife assists with housework, cooking, cleaning. Pt currently CGA for transfers and very short ambulation distances from bed to chair with RW. Reports has 4 WW at home with seat on it that is available for use. Per chart and pt, pt goal is to return home with family and be comfortable. May be receiving home hospice care. Rec pt dc  with 24hr A/supervision and HHPT to improve safety and independence with mobility. Treatment Diagnosis: Impaired functional mobility  Prognosis: Fair  Decision Making: High Complexity  PT Education: Goals;Transfer Training;PT Role;Energy Conservation; Functional Mobility Training;Plan of Care;General Safety;Home Exercise Program;Gait Training;Precautions  Barriers to Learning: none  REQUIRES PT FOLLOW UP: Yes  Activity Tolerance  Activity Tolerance: Patient limited by fatigue;Patient limited by endurance       Patient Diagnosis(es): There were no encounter diagnoses.      has a past medical history of Abnormal CT of the chest, Anxiety, Back pain, CAD (coronary artery disease), Cancer ([address] CHF (congestive heart failure) ([address] COPD (chronic obstructive

## 2020-05-22 NOTE — PROGRESS NOTES
seen.    Assessment:  Active Problems:    Type 2 diabetes mellitus with complication (HCC)    Mediastinal lymphadenopathy    Shortness of breath    Small cell lung cancer (HCC)    Malignant pleural effusion    Acute on chronic respiratory failure with hypoxia (HCC)    Pleural metastasis (HCC)    Hepatic metastasis (HCC)    Lung collapse    Atrial fibrillation with RVR (HCC)    Elevated lactic acid level    Cardiomyopathy (HCC)    Diastolic dysfunction    Restrictive lung disease  Resolved Problems:    * No resolved hospital problems.  *          Plan:   · Oxygen supplementation to keep saturation between 90 to 94% only-patient continues to be on vapotherm oxygenation -oxygen requirements have decreased and RT requested to try high flow oxygen  · Patient is off cardiazem drip and is on PO cardizem and has sinus rhythm on the monitor   ·  s/p emergent thoracentesis which shows exudative fluid and rest of the analysis including  Cytology is suspicious of malignancy   · Patient is s/p bronchoscopy -results are benign  · BGM with SSI   · Patient has had extensive antibiotic therapy in the recent past at [facility] and the bronchoscopy cultures were negative for any pneumonic process at that time also  · Bronchoscopy done here is suggestive of candida albicans   · Bronchodilators-albuterol changed to Xopenex   · Patient does not have any acute bronchospasm requiring patient to be given any IV steroids  · S/P pleurX catheter insertion with good o/p   · Heparin infusion was changed to Eliquis by cardiology  · Monitor for any bleeding   · Echocardiogram results reviewed   · PUD prophylaxis as per IM       Case d/w ICU team and CT surgery         Patient has guarded long-term prognosis given that patient has metastatic small cell lung cancer-patient wants to be full code at this time-would recommend palliative care consult if deemed appropriate to discuss again about the goals of care and CODE STATUS-palliative

## 2020-05-22 NOTE — PROGRESS NOTES
[address] Daily Progress Note      Admit Date:  5/18/2020    Subjective:  Mr. Dori Junior is seen for cardiology follow up for aflutter RVR, rate is better controlled and he is off cardizem drip. He is on 30 l/min O2. He wants to go home. He has chest tubes in. He still insists on going home today. He is alert and awake. Abdomen no longer painful. Mild resp distress. Underwent pleural drainage procedure on right and is draining slightly turbid  clear fluid. He feels better today Still has tachypnea  Denies chest pain no fever  History of Present Illness:  Manny Fisher is a 70 y.o. patient who presented to the hospital with complaints of shortness of breath for last two months. Two months ago he was diagnosed with small cell lung cancer. Early part of May 2020 patient had right thoracentesis at [facility]. Patient had left AMA. He had gotten worse in terms of SOB. He was admitted to [facility]. Patient says he has received two radiation treatments of his rt lung. He is transferred here from [facility] for further care on 5.18.20. Today I have been asked to see him for his cardiac arrhythmia. He has known history of parox afib. CAD old anterior wall MI and stents in 2001. He does not know the details and is visibly short of breath to carry out long conversations.     ROS:  12 point ROS negative in all areas as listed below except as in [address]  Constitutional, EENT, Cardiovascular, pulmonary, GI, , Musculoskeletal, skin, neurological, hematological, endocrine, Psychiatric    Past Medical History:   Diagnosis Date    Abnormal CT of the chest     Anxiety     Back pain     CAD (coronary artery disease)     Cancer (HCC)     skin    CHF (congestive heart failure) (HCC)     COPD ([facility] obstructive pulmonary disease) [facility]     Depression     Diabetes mellitus ([facility] [address]     Heart disease     Hernia     History of blood transfusion     Hyperlipidemia     Hypertension  Osteoarthritis     Prolonged emergence from general anesthesia     Pulmonary nodule     Rheumatic fever     Small cell lung cancer (HCC)     Type II or unspecified type diabetes mellitus without mention of complication, not stated as uncontrolled      Past Surgical History:   Procedure Laterality Date    BRONCHOSCOPY Bilateral 10/09/2019    EBUS    BRONCHOSCOPY N/A 10/9/2019    EBUS NF W/ANES. (07:00) NO LABS performed by Donya Cordoba MD at [address]  10/9/2019    BRONCHOSCOPY ALVEOLAR LAVAGE performed by Donya Cordoba MD at [address]  10/9/2019    BRONCHOSCOPY/TRANSBRONCHIAL NEEDLE BIOPSY performed by Donya Cordoba MD at [address]  10/9/2019    BRONCHOSCOPY/TRANSBRONCHIAL LUNG BIOPSY ADDL LOBE performed by Donya Cordoba MD at [address] N/A 4/22/2020    EBUS NF W/ANES.  (10:30) NO LABS performed by Donya Cordoba MD at [address]  4/22/2020    BRONCHOSCOPY ALVEOLAR LAVAGE performed by Donya Cordoba MD at [address]  4/22/2020    BRONCHOSCOPY BIOPSY BRONCHUS performed by Donya Cordoba MD at [address]  4/22/2020    BRONCHOSCOPY BRUSHINGS performed by Donya Cordoba MD at [address]  4/22/2020    BRONCHOSCOPY/TRANSBRONCHIAL NEEDLE BIOPSY performed by Donya Cordoba MD at [address]  4/22/2020    BRONCHOSCOPY/TRANSBRONCHIAL NEEDLE BIOPSY ADDL LOBE performed by Donya Cordoba MD at [address] N/A 5/19/2020    BRONCHOSCOPY ALVEOLAR LAVAGE performed by Haja Gutiérrez MD at [address]  5/19/2020    BRONCHOSCOPY THERAPUTIC ASPIRATION INITIAL performed by Haja Gutiérrez MD at [facility]      stents x4    CIRCUMCISION      CORONARY ARTERY BYPASS GRAFT MAZE PROCEDURE Right 5/20/2020    ULTRASOUND GUIDED RIGHT PLEUR-X CATHETER INSERTION performed by Roberto Eldridge MD at [address] Bilateral 12/17/2018    BILATERAL LUMBAR THREE FOUR EPIDURAL STEROID INJECTION SITE CONFIRMED BY FLUOROSCOPY performed by Jarek Cohen MD at [facility] DX/THER SBST INTRLMNR CRV/THRC W/IMG GDN Right 7/31/2018    RIGHT LUMBAR FIVE SIX EPIDURAL STEROID INJECTION SITE CONFIRMED BY FLUOROSCOPY performed by Jarek Cohen MD at [facility] DX/THER SBST INTRLMNR CRV/THRC W/IMG GDN Bilateral 9/18/2018    RIGHT LUMBAR THREE, LUBMAR FOUR, LUMBAR FIVE MEDIAL BRANCH BLOCK SITE CONFIRMED BY FLUOROSCOPY performed by Jarek Cohen MD at [facility] DX/THER SBST INTRLMNR CRV/THRC W/IMG GDN Bilateral 10/8/2018    BILATERAL LUMBAR THREE, LUMBAR FOUR, LUMBAR FIVE RADIOFREQUENCY ABLATION SITE CONFIRMED BY FLUOROSCOPY performed by Jarek Cohen MD at [facility] N/A 8/27/2019    MIDLINE LUMBAR SPINAL CORD STIMULATOR TRIAL WITH [employer] performed by Silvia Harris MD at [place] [address]       Objective:   BP 93/64   Pulse 116   Temp 98.4 °F (36.9 °C) (Oral)   Resp 29   Ht 5' 11\" (1.803 m)   Wt 180 lb 8.9 oz (81.9 kg)   SpO2 92%   BMI 25.18 kg/m²       Intake/Output Summary (Last 24 hours) at 5/22/2020 1007  Last data filed at 5/22/2020 0631  Gross per 24 hour   Intake 382 ml   Output 1010 ml   Net -628 ml       TELEMETRY:  NSR  Physical Exam:  General Appearance:  Alert, cooperative, Mild resp distress, appears stated age   Head:  Normocephalic, without obvious abnormality, atraumatic   Eyes:  PERRL, conjunctiva/corneas clear         Nose: Nares normal, no drainage or sinus tenderness   Throat: Lips, mucosa, and tongue normal   Neck: Supple, symmetrical, trachea midline, no adenopathy, thyroid: not enlarged, symmetric, no tenderness/mass/nodules, no carotid bruit or JVD         Lungs: Clear to auscultation on left. Dullness and absent breath sounds on right. Chest Wall:  No tenderness or deformity   Heart:  Irregular rate and rhythm, S1, S2 normal, no murmur, rub or gallop   Abdomen:   Soft, liver is markedly enlarged and non tender, bowel sounds active all four quadrants,  no masses,      He has ascites.         Extremities: Extremities normal, atraumatic, no cyanosis or edema   Pulses: 1+ femorals but neg in feet . Feet are cold. Skin: Skin color, texture, turgor normal, no rashes or lesions   Pysch: Normal mood and affect   Neurologic: Normal gross motor and sensory exam.           Medications:    melatonin  5 mg Oral Nightly    megestrol  20 mg Oral Daily    dilTIAZem  120 mg Oral Daily    apixaban  5 mg Oral BID    digoxin  125 mcg Intravenous Daily    ipratropium  0.5 mg Nebulization BID    levalbuterol  0.63 mg Nebulization BID    mupirocin   Nasal BID    aspirin  81 mg Oral Daily    citalopram  20 mg Oral Daily    metoprolol succinate  50 mg Oral Daily    pantoprazole  40 mg Oral Daily    sodium chloride flush  10 mL Intravenous 2 times per day    insulin lispro  0-6 Units Subcutaneous TID WC    insulin lispro  0-3 Units Subcutaneous Nightly      dextrose       oxyCODONE-acetaminophen **OR** oxyCODONE-acetaminophen, albuterol sulfate HFA, sodium chloride flush, acetaminophen **OR** acetaminophen, polyethylene glycol, promethazine **OR** ondansetron, glucose, dextrose, glucagon (rDNA), dextrose, perflutren lipid microspheres, melatonin    Lab Data:  CBC:   Recent Labs     05/21/20  0315   *     BMP:   Recent Labs     05/22/20  0626   *   K 5.3*   CL 96*   CO2 31   BUN 37*   CREATININE 0.7*     LIVER PROFILE:   No results for input(s): AST, ALT, LIPASE, BILIDIR, BILITOT, ALKPHOS in the last 72 hours. Invalid input(s): AMYLASE,  ALB  PT/INR: No results for input(s): PROTIME, INR in the last 72 hours.   APTT:   Recent Labs     05/20/20  0759 05/21/20  0315

## 2020-05-22 NOTE — PROGRESS NOTES
Report to Linden Reyes RN from . All questions answered.  Pt transfer to  per bed and [address]

## 2020-05-22 NOTE — CARE COORDINATION
See palliative RN, Adore, note. Plan is for patient to discharge to [place] [address] tomorrow. Unsure if Papa or Susi Will revisit tomorrow and HOC RN will be at the hospital to help make arrangements.

## 2020-05-22 NOTE — PROGRESS NOTES
Progress Note    S/P RIGHT pleurX    CC: sob    Hospital Course:  5/21 - laying in bed  5/22  No appetite. Breathing better than before drainage procedure. Not sleeping. Vital Signs:                                                 BP (!) 76/58   Pulse 99   Temp 98.4 °F (36.9 °C) (Oral)   Resp (!) 31   Ht 5' 11\" (1.803 m)   Wt 180 lb 8.9 oz (81.9 kg)   SpO2 91%   BMI 25.18 kg/m²  O2 Flow Rate (L/min): 30 L/min        Admission Weight: 192 lb 7.4 oz (87.3 kg)      I/O:      Intake/Output Summary (Last 24 hours) at 5/22/2020 0753  Last data filed at 5/22/2020 0631  Gross per 24 hour   Intake 392 ml   Output 1060 ml   Net -668 ml     Chest Tube:  296- ml/shift    O/E  GEN: aao x 3  HEENT: unremkarable  CV: reg, wound c/d/i  Pulm: decreased BS on R side  Abd: soft, nt, nd, no peritoneal signs  Ext: warm, edema, wound c/d/i    Data Review:  CBC:   Recent Labs     05/21/20  0315   *     BMP:   Recent Labs     05/22/20  0626   *   K 5.3*   CL 96*   CO2 31   BUN 37*   CREATININE 0.7*   CALCIUM 9.9     Cardiac Enzymes:   No results for input(s): CKTOTAL, CKMB, CKMBINDEX, TROPONINI in the last 72 hours. PT/INR: No results for input(s): PROTIME, INR in the last 72 hours. APTT:   Recent Labs     05/20/20  0759 05/21/20  0315 05/21/20  1102   APTT 32.7 43.1* 48.6*       Assessment/Plan:  No acute events   Overnight pleurX with 635 ml of straw-colored fluid  Water seal today. Leave connected to [facility] for discharge [address] [facility] to d/c from my standpoint   dispo plan:  case mx updated.   F/u with my office in 3 week post discharge       Sita French MD  Cardiothoracic Surgery  5/22/2020  7:53 AM

## 2020-05-22 NOTE — PLAN OF CARE
Problem: Falls - Risk of:  Goal: Will remain free from falls  Description: Will remain free from falls  Outcome: Ongoing     Problem: Falls - Risk of:  Goal: Absence of physical injury  Description: Absence of physical injury  Outcome: Ongoing     Problem: Pain:  Goal: Pain level will decrease  Description: Pain level will decrease  Outcome: Ongoing     Problem:  Activity:  Goal: Ability to tolerate increased activity will improve  Description: Ability to tolerate increased activity will improve  Outcome: Ongoing     Problem: Breathing Pattern - Ineffective:  Goal: Ability to achieve and maintain a regular respiratory rate will improve  Description: Ability to achieve and maintain a regular respiratory rate will improve  Outcome: Ongoing

## 2020-05-22 NOTE — PLAN OF CARE
Problem: Falls - Risk of:  Goal: Will remain free from falls  Description: Will remain free from falls  5/22/2020 1105 by Danyell Lozano RN  Outcome: Ongoing  5/22/2020 0015 by Magi Bacon RN  Outcome: Ongoing  Goal: Absence of physical injury  Description: Absence of physical injury  5/22/2020 1105 by Danyell Lozano RN  Outcome: Ongoing  5/22/2020 0015 by Magi Bacon RN  Outcome: Ongoing     Problem: Pain:  Description: Pain management should include both nonpharmacologic and pharmacologic interventions. Goal: Pain level will decrease  Description: Pain level will decrease  5/22/2020 1105 by Danyell Lozano RN  Outcome: Ongoing  5/22/2020 0015 by Magi Bacon RN  Outcome: Ongoing  Goal: Control of acute pain  Description: Control of acute pain  Outcome: Ongoing  Goal: Control of chronic pain  Description: Control of chronic pain  Outcome: Ongoing     Problem:  Activity:  Goal: Ability to tolerate increased activity will improve  Description: Ability to tolerate increased activity will improve  5/22/2020 1105 by Danyell Lozano RN  Outcome: Ongoing  5/22/2020 0015 by Magi Bacon RN  Outcome: Ongoing     Problem: Breathing Pattern - Ineffective:  Goal: Ability to achieve and maintain a regular respiratory rate will improve  Description: Ability to achieve and maintain a regular respiratory rate will improve  5/22/2020 1105 by Danyell Lozano RN  Outcome: Ongoing  5/22/2020 0015 by Magi Bacon RN  Outcome: Ongoing

## 2020-05-22 NOTE — PROGRESS NOTES
Occupational Therapy   Occupational Therapy Initial Assessment and Treatment Note  Date: 2020   Patient Name: Bam Coulter  MRN: [id number]     : 1945    Date of Service: 2020    Discharge Recommendations:  Continue to assess pending progress, 24 hour supervision or assist, Home with Home health OT, Home with nursing aide  OT Equipment Recommendations  Equipment Needed: No    Assessment   Performance deficits / Impairments: Decreased functional mobility ; Decreased balance;Decreased ADL status; Decreased endurance;Decreased strength;Decreased posture  Assessment: Pt reports typically independent with functional mobility/transfers within home, but has been requiring assistance from his wife more recently d/t weakness/fatigue. Pt presents grossly at [facility] for functional mobility/transfers with use of RW, and believe pt would be safe to return home with 24/7 supervision/assist and home health services. Continue to assess with OT tx. Prognosis: Fair  Decision Making: Medium Complexity  OT Education: OT Role;Plan of Care;ADL Adaptive Strategies;Orientation;Transfer Training;Energy Conservation;Precautions; Equipment  REQUIRES OT FOLLOW UP: Yes  Activity Tolerance  Activity Tolerance: Patient limited by fatigue  Activity Tolerance: Pt SpO2 >/= 90% on 30L high flow VapoTherm during session, HR 110s; BP WFL at EOB and up in chair; RN aware  Safety Devices  Safety Devices in place: Yes  Type of devices: Left in chair;Call light within reach;Gait belt;Nurse notified(no alarm okay per RN)           Patient Diagnosis(es): There were no encounter diagnoses.      has a past medical history of Abnormal CT of the chest, Anxiety, Back pain, CAD (coronary artery disease), Cancer ([address] CHF (congestive heart failure) ([address] COPD (chronic obstructive pulmonary disease) ([facility] Depression, Diabetes mellitus ([facility] Heart disease, Hernia, History of blood transfusion, Hyperlipidemia, Hypertension, Osteoarthritis, Prolonged Requires cues for some        Sensation  Overall Sensation Status: WFL        LUE AROM (degrees)  LUE AROM : WFL  Left Hand AROM (degrees)  Left Hand AROM: WFL  RUE AROM (degrees)  RUE AROM : WFL  Right Hand AROM (degrees)  Right Hand AROM: WFL                Plan   Plan  Times per week: 3-5x/week   Current Treatment Recommendations: Strengthening, Balance Training, Functional Mobility Training, Endurance Training, Patient/Caregiver Education & Training, Equipment Evaluation, Education, & procurement, Self-Care / ADL, Pain Management, Safety Education & Training, Positioning, Gait Training, Wheelchair Mobility Training    AM-PAC Score        AM-PAC Inpatient Daily Activity Raw Score: 14 (05/22/20 1154)  AM-PAC Inpatient ADL T-Scale Score : 33.39 (05/22/20 1154)  ADL Inpatient CMS 0-100% Score: 59.67 (05/22/20 1154)  ADL Inpatient CMS G-Code Modifier : CK (05/22/20 1154)    Goals  Short term goals  Time Frame for Short term goals: 1 week (by 5/29/20)  Short term goal 1: Pt will complete functional transfers with Supervision  Short term goal 2: Pt will complete LE dressing with Min A  Short term goal 3: Pt will perform 3 minutes standing activity with SpO2 WFL and SBA by 5/26  Patient Goals   Patient goals : \"to go home\"       Therapy Time   Individual Concurrent Group Co-treatment   Time In 0805(10 minutes for eval)         Time Out 0855         Minutes 50         Timed Code Treatment Minutes: 40 Minutes     This note to serve as d/c summary should pt d/c prior to next session.     Hali Landry OTR/L

## 2020-05-22 NOTE — PROGRESS NOTES
4 Eyes Skin Assessment     The patient is being assess for   low jorgito    I agree that 2 RN's have performed a thorough Head to Toe Skin Assessment on the patient. ALL assessment sites listed below have been assessed. Areas assessed by both nurses:   [x]   Head, Face, and Ears   [x]   Shoulders, Back, and Chest, Abdomen  [x]   Arms, Elbows, and Hands   [x]   Coccyx, Sacrum, and Ischium  [x]   Legs, Feet, and Heels        Right pleur-x drain, scatt bruising    **SHARE this note so that the co-signing nurse is able to place an eSignature**    Co-signer eSignature: Electronically signed by Jarrde Julian RN on 5/22/20 at 7:48 PM EDT    Does the Patient have Skin Breakdown?   No          Jorgito Prevention initiated:  Yes   Wound Care Orders initiated:  No      WOC nurse consulted for Pressure Injury (Stage 3,4, Unstageable, DTI, NWPT, Complex wounds)and New or Established Ostomies:  NA      Primary Nurse eSignature: Electronically signed by Carmelo Stafford RN on 5/22/20 at 7:26 PM EDT

## 2020-05-22 NOTE — PROGRESS NOTES
Pulmonology following. Right-sided malignant pleural effusion: Secondary to small cell lung cancer. Status post thoracentesis with 600 mL removal.  CT surgery consulted; s/p Pleurx catheter with 2.5 L fluid removed initially. Continue Pleurx to water seal at discharge. Small cell lung cancer: Followed by oncology at [facility]. He recently completed his first 3 radiation treatments. He had a Mediport placed last week. Chemotherapy was planned for 5/18/2020 but missed due to his hospitalization. Patient and family wish to proceed with Hospice care. Atrial fibrillation with rapid ventricular rate: Uncontrolled. Cardiology consulted. Continue digoxin and Cardizem drip. TTE shows LVEF 35 to 40% which is similar to his prior. No significant valvular disease. Cardiology recommends anticoagulation. Can change heparin to Eliquis. Chronic combined diastolic and systolic CHF-cardiology following. Is TTE shows LVEF 35 to 40% which is similar to his prior. Continue medical management and monitor. Acute kidney injury-possibly secondary to prerenal as well as nephrotoxic medications, encourage p.o. intake hold lisinopril and metformin, monitor BMP. CAD stent-on aspirin beta-blocker and statin    Diabetes-hold metformin monitor blood sugar with low correction scale insulin. COPD-not in exacerbation continue home inhalers    Hyperkalemia-not quite sure is a hemolyzed sample or real-repeat stat BMP    Leukocytosis-possibly secondary to hemoconcentration, started on empirical antibiotic for possible pneumonia, monitor closely    Poor appetite-consider dietitian evaluation,    DVT Prophylaxis: Eliquis  Diet: DIET CARB CONTROL; Carb Control: 5 carb choices (75 gms)/meal  Dietary Nutrition Supplements: Standard High Calorie Oral Supplement  Code Status: Limited  PT/OT Eval Status: NA    Dispo -Plan for discharge to [address], likely on 5/23 once arrangements in place.  Will try to

## 2020-05-23 VITALS
BODY MASS INDEX: 25.19 KG/M2 | OXYGEN SATURATION: 96 % | DIASTOLIC BLOOD PRESSURE: 76 MMHG | WEIGHT: 179.9 LBS | HEART RATE: 96 BPM | HEIGHT: 71 IN | RESPIRATION RATE: 20 BRPM | SYSTOLIC BLOOD PRESSURE: 113 MMHG | TEMPERATURE: 98.4 F

## 2020-05-23 LAB
ANION GAP SERPL CALCULATED.3IONS-SCNC: 9 MMOL/L (ref 3–16)
BUN BLDV-MCNC: 41 MG/DL (ref 7–20)
CALCIUM SERPL-MCNC: 9.7 MG/DL (ref 8.3–10.6)
CHLORIDE BLD-SCNC: 92 MMOL/L (ref 99–110)
CO2: 27 MMOL/L (ref 21–32)
CREAT SERPL-MCNC: 0.7 MG/DL (ref 0.8–1.3)
GFR AFRICAN AMERICAN: >60
GFR NON-AFRICAN AMERICAN: >60
GLUCOSE BLD-MCNC: 109 MG/DL (ref 70–99)
GLUCOSE BLD-MCNC: 116 MG/DL (ref 70–99)
PERFORMED ON: ABNORMAL
PLATELET # BLD: 405 K/UL (ref 135–450)
POTASSIUM SERPL-SCNC: 4.6 MMOL/L (ref 3.5–5.1)
SODIUM BLD-SCNC: 128 MMOL/L (ref 136–145)

## 2020-05-23 PROCEDURE — 80048 BASIC METABOLIC PNL TOTAL CA: CPT

## 2020-05-23 PROCEDURE — 6370000000 HC RX 637 (ALT 250 FOR IP): Performed by: THORACIC SURGERY (CARDIOTHORACIC VASCULAR SURGERY)

## 2020-05-23 PROCEDURE — 36415 COLL VENOUS BLD VENIPUNCTURE: CPT

## 2020-05-23 PROCEDURE — 2700000000 HC OXYGEN THERAPY PER DAY

## 2020-05-23 PROCEDURE — 94761 N-INVAS EAR/PLS OXIMETRY MLT: CPT

## 2020-05-23 PROCEDURE — 6370000000 HC RX 637 (ALT 250 FOR IP): Performed by: INTERNAL MEDICINE

## 2020-05-23 PROCEDURE — 99231 SBSQ HOSP IP/OBS SF/LOW 25: CPT | Performed by: INTERNAL MEDICINE

## 2020-05-23 PROCEDURE — 85049 AUTOMATED PLATELET COUNT: CPT

## 2020-05-23 PROCEDURE — 2580000003 HC RX 258: Performed by: INTERNAL MEDICINE

## 2020-05-23 RX ORDER — LEVALBUTEROL 1.25 MG/.5ML
0.63 SOLUTION, CONCENTRATE RESPIRATORY (INHALATION) EVERY 6 HOURS PRN
Status: DISCONTINUED | OUTPATIENT
Start: 2020-05-23 | End: 2020-05-23 | Stop reason: HOSPADM

## 2020-05-23 RX ADMIN — DIGOXIN 125 MCG: 125 TABLET ORAL at 09:33

## 2020-05-23 RX ADMIN — MEGESTROL ACETATE 20 MG: 20 TABLET ORAL at 09:36

## 2020-05-23 RX ADMIN — CITALOPRAM HYDROBROMIDE 20 MG: 20 TABLET ORAL at 09:32

## 2020-05-23 RX ADMIN — METOPROLOL SUCCINATE 50 MG: 50 TABLET, EXTENDED RELEASE ORAL at 09:32

## 2020-05-23 RX ADMIN — OXYCODONE HYDROCHLORIDE AND ACETAMINOPHEN 1 TABLET: 5; 325 TABLET ORAL at 10:45

## 2020-05-23 RX ADMIN — DILTIAZEM HYDROCHLORIDE 120 MG: 120 CAPSULE, COATED, EXTENDED RELEASE ORAL at 09:33

## 2020-05-23 RX ADMIN — APIXABAN 5 MG: 5 TABLET, FILM COATED ORAL at 09:33

## 2020-05-23 RX ADMIN — Medication 10 ML: at 09:34

## 2020-05-23 RX ADMIN — PANTOPRAZOLE SODIUM 40 MG: 40 TABLET, DELAYED RELEASE ORAL at 09:33

## 2020-05-23 ASSESSMENT — PAIN SCALES - GENERAL: PAINLEVEL_OUTOF10: 3

## 2020-05-23 NOTE — PROGRESS NOTES
[facility] RN  requesting if you can please do discharge and sign DNR that's in chart as pt has a pickup time to go to [address] at 1130am. Please and thank you.  Message sent to Dr. Anthony Hall

## 2020-05-23 NOTE — PROGRESS NOTES
INPATIENT PULMONARY CRITICAL CARE PROGRESS NOTE      Reason for visit    Acute on chronic resp failure, massive right-sided pleural effusion    SUBJECTIVE: Patient underwent Pleurx catheter placement and about 315 mL of fluid came out overnight, patient now on high flow oxygen @13 lts/min with saO2 of 96%, patient was sleeping when seen , patient is essentially afebrile this morning, patient was hemodynamically maintained, patient's blood sugar control are better controlled , patient's rhythm has changed to a sinus rhythm on the monitor, patient urine output was on lower side overnight  As documented in the Epic with cumulative fluid balance of -4.3 L, no increased cough/expectoration/sob/wheezing /pleuritic chest pain when seen ;no other pertinent review of system of concern        Physical Exam:  Blood pressure 113/76, pulse 96, temperature 98.4 °F (36.9 °C), temperature source Oral, resp. rate 20, height 5' 11\" (1.803 m), weight 179 lb 14.3 oz (81.6 kg), SpO2 96 %.'     Constitutional: In no  respiratory distress when seen  HENT:  Oropharynx is clear and moist. No thyromegaly. Eyes:  Conjunctivae are normal. Pupils equal, round, and reactive to light. No scleral icterus. Neck: . No tracheal deviation present. No obvious thyroid mass. Cardiovascular: S1S2 RR, normal heart sounds. No right ventricular heave. No lower extremity edema. Pulmonary/Chest: No wheezes. Scattered rales. Much decreased chest congestion Chest wall is  dull to percussion on right side. (-) accessory muscle usage, no stridor. Decreased BSI ;port(+), right sided Pleurx catheter with atrium present  Abdominal: Soft. Bowel sounds present. No distension or hernia. No tenderness. Musculoskeletal: No cyanosis. No clubbing. No obvious joint deformity. Lymphadenopathy: No cervical or supraclavicular adenopathy. Skin: Skin is warm and dry. No rash or nodules on the exposed extremities.   Neurologic:  sleeping when seen cancer ([facility] [address]    Malignant pleural effusion    Acute on chronic respiratory failure with hypoxia (HCC)    Pleural metastasis (HCC)    Hepatic metastasis (HCC)    Lung collapse    Atrial fibrillation with RVR (HCC)    Elevated lactic acid level    Cardiomyopathy (HCC)    Diastolic dysfunction    Restrictive lung disease  Resolved Problems:    * No resolved hospital problems.  *          Plan:   · Oxygen supplementation to keep saturation between 90 to 94% only-patient continues to be on vapotherm oxygenation -oxygen requirements have decreased and RT requested to try high flow oxygen  ·  s/p emergent thoracentesis which shows exudative fluid and rest of the analysis including  Cytology is suspicious of malignancy   · Patient is s/p bronchoscopy -results are benign  · BGM with SSI   · Bronchoscopy done here is suggestive of candida albicans   · Bronchodilators-albuterol changed to Xopenex   · Patient does not have any acute bronchospasm requiring patient to be given any IV steroids  · S/P pleurX catheter insertion with good o/p   · Heparin infusion was changed to Eliquis by cardiology  · Monitor for any bleeding   · Echocardiogram results reviewed   · PUD prophylaxis as per IM       Case d/w ICU team and CT surgery         Patient has guarded long-term prognosis given that patient has metastatic small cell lung cancer-patient wants to be full code at this time-would recommend palliative care consult if deemed appropriate to discuss again about the goals of care and CODE STATUS-palliative care saw patient and is now a limited code    Patient and family have decided upon hospice care     Will sign off         Electronically signed by:  Fannie Mathew MD    5/23/2020    11:45 AM.

## 2020-05-23 NOTE — FLOWSHEET NOTE
05/22/20 2052   Assessment   Charting Type Shift assessment   Neurological   Level of Consciousness 0   Orientation Level Oriented X4   Cognition Appropriate judgement; Appropriate safety awareness; Appropriate attention/concentration; Appropriate for developmental age   Language Clear; Appropriate for developmental age   Size R Pupil (mm) 3   R Pupil Shape Round   R Pupil Reaction Brisk   Size L Pupil (mm) 3   L Pupil Shape Round   L Pupil Reaction Brisk   R Hand  Moderate   L Hand  Moderate   R Foot Dorsiflexion Moderate   L Foot Dorsiflexion Moderate   R Foot Plantar Flexion Moderate   L Foot Plantar Flexion Moderate   RUE Motor Response Responds to command;Normal extension;Normal flexion   Sensation RUE Full sensation   LUE Motor Response Responds to command;Normal extension;Normal flexion   Sensation LUE Full sensation   RLE Motor Response Responds to command;Normal extension;Normal flexion   Sensation RLE Full sensation   LLE Motor Response Responds to command;Normal extension;Normal flexion   Sensation LLE Full sensation   Gag Present   Tongue Deviation None   Swallow Screening   Is the patient able to remain alert for testing? Yes   Was the Patient Eating a Modified Diet Prior to being Admitted? No   Is there an Existing PEG or Abdominal Feeding Tube? No   Does the patient have a Head of Bed [facility] restriction <30°? No   Is there a Tracheostomy Tube Present? No   3 oz Water Swallow Screen Pass   Paige Coma Scale   Eye Opening 4   Best Verbal Response 5   Best Motor Response 6   Paige Coma Scale Score 15   HEENT   Right Eye Intact; Impaired vision   Left Eye Intact; Impaired vision   Right Ear Impaired hearing   Left Ear Impaired hearing   Nose Intact   Throat Intact;Dry   Neck Trachea midline   Mucous Membrane Intact;Dry   Teeth Missing teeth   Respiratory   Respiratory Pattern Regular   Respiratory Depth Normal   Respiratory Quality/Effort Dyspnea with exertion;Dyspnea at rest   Chest Assessment

## 2020-06-04 ENCOUNTER — TELEPHONE (OUTPATIENT)
Dept: CARDIOTHORACIC SURGERY | Age: 75
End: 2020-06-04

## 2020-06-06 NOTE — DISCHARGE SUMMARY
short of breath . There is no change in mental status. No fever, nausea vomiting or diarrhea. His appetite is not that great and he is on protein supplements. Whenever he swallow his son has some pain in the throat as well. He has chronic cough and white sputum. Stat x-ray and EKG were done at bedside. He was seen by pulmonologist and advised stat CAT scan of the chest.       Assessment/Plan:         Active Hospital Problems     Diagnosis    Malignant pleural effusion [J91.0]    Acute on chronic respiratory failure with hypoxia (HCC) [J96.21]    Pleural metastasis (HCC) [C78.2]    Hepatic metastasis ([address] [C78.7]    Lung collapse [J98.19]    Atrial fibrillation with RVR ([address] [I48.91]    Elevated lactic acid level [R79.89]    Cardiomyopathy ([address] [X17.0]    Diastolic dysfunction [Y94.91]    Restrictive lung disease [J98.4]    Small cell lung cancer (HCC) [C34.90]    Shortness of breath [R06.02]    Mediastinal lymphadenopathy [R59.0]    Type 2 diabetes mellitus with complication (HCC) [J70.2]      Acute on chronic hypoxic respiratory failure: Secondary to small cell lung cancer with likely bronchial obstruction, mucous plugging and large right-sided malignant pleural effusion. PleurX catheter placed. s/p bronchoscopy for mucous plugging. Will wean Vapotherm to HFNC in preparation for transfer to [address] [place] [address]. Pulmonology following.     Right-sided malignant pleural effusion: Secondary to small cell lung cancer. Status post thoracentesis with 600 mL removal.  CT surgery consulted; s/p Pleurx catheter with 2.5 L fluid removed initially. Continue Pleurx to water seal at discharge.       Small cell lung cancer: Followed by oncology at [facility]. He recently completed his first 3 radiation treatments. He had a Mediport placed last week. Chemotherapy was planned for 5/18/2020 but missed due to his hospitalization.   Patient and family wish to proceed with Hospice care.      Atrial fibrillation with rapid ventricular rate: Uncontrolled. Cardiology consulted. Continue digoxin and Cardizem drip. TTE shows LVEF 35 to 40% which is similar to his prior. No significant valvular disease. Cardiology recommends anticoagulation. Can change heparin to Eliquis.       Chronic combined diastolic and systolic CHF-cardiology following. Is TTE shows LVEF 35 to 40% which is similar to his prior. Continue medical management and monitor.     Acute kidney injury-possibly secondary to prerenal as well as nephrotoxic medications, encourage p.o. intake hold lisinopril and metformin, monitor BMP.    CAD stent-on aspirin beta-blocker and statin     Diabetes-hold metformin monitor blood sugar with low correction scale insulin.      COPD-not in exacerbation continue home inhalers     Hyperkalemia-not quite sure is a hemolyzed sample or real-repeat stat BMP     Leukocytosis-possibly secondary to hemoconcentration, started on empirical antibiotic for possible pneumonia, monitor closely     Poor appetite-consider dietitian evaluation,    Plan for IP hospice     Physical Exam Performed:     /76   Pulse 96   Temp 98.4 °F (36.9 °C) (Oral)   Resp 20   Ht 5' 11\" (1.803 m)   Wt 179 lb 14.3 oz (81.6 kg)   SpO2 96%   BMI 25.09 kg/m²       General appearance:  No apparent distress, appears stated age and cooperative. HEENT:  Normal cephalic, atraumatic without obvious deformity. Pupils equal, round, and reactive to light. Extra ocular muscles intact. Conjunctivae/corneas clear. Neck: Supple, with full range of motion. No jugular venous distention. Trachea midline. Respiratory:  Normal respiratory effort. Clear to auscultation, bilaterally without Rales/Wheezes/Rhonchi. Cardiovascular:  Regular rate and rhythm with normal S1/S2 without murmurs, rubs or gallops. Abdomen: Soft, non-tender, non-distended with normal bowel sounds. Musculoskeletal:  No clubbing, cyanosis or edema bilaterally.     Skin: Skin

## 2020-06-08 LAB
FUNGUS (MYCOLOGY) CULTURE: NORMAL
FUNGUS STAIN: NORMAL

## 2020-06-16 LAB
AFB CULTURE (MYCOBACTERIA): NORMAL
AFB SMEAR: NORMAL

## 2020-06-22 LAB
FUNGUS (MYCOLOGY) CULTURE: ABNORMAL
FUNGUS (MYCOLOGY) CULTURE: ABNORMAL
FUNGUS STAIN: ABNORMAL
ORGANISM: ABNORMAL
ORGANISM: ABNORMAL

## (undated) DEVICE — SYRINGE MED 10ML SLIP TIP BLNT FILL AND LUERLOCK DISP

## (undated) DEVICE — SINGLE USE BIOPSY VALVE MAJ-210: Brand: SINGLE USE BIOPSY VALVE (STERILE)

## (undated) DEVICE — ELECTRODE,RADIOTRANSLUCENT,FOAM,3PK: Brand: MEDLINE

## (undated) DEVICE — TOWEL OR BLUEE 16X26IN ST 8 PACK ORB08 16X26ORTWL

## (undated) DEVICE — SINGLE USE SUCTION VALVE MAJ-209: Brand: SINGLE USE SUCTION VALVE (STERILE)

## (undated) DEVICE — CANNULA,OXY,ADULT,SUPERSOFT,W/7'TUB,SC: Brand: MEDLINE INDUSTRIES, INC.

## (undated) DEVICE — STERILE POLYISOPRENE POWDER-FREE SURGICAL GLOVES: Brand: PROTEXIS

## (undated) DEVICE — GAUZE,SPONGE,4"X4",16PLY,STRL,LF,10/TRAY: Brand: MEDLINE

## (undated) DEVICE — BRUSH CYTO L150CM CHN L2MM BRIST DIA1.9MM PTFE S STL BULL

## (undated) DEVICE — BOWL MED M 16OZ PLAS CAP GRAD

## (undated) DEVICE — TOWEL,OR,DSP,ST,BLUE,STD,6/PK,12PK/CS: Brand: MEDLINE

## (undated) DEVICE — CHLORAPREP 26ML ORANGE

## (undated) DEVICE — UNIVERSAL BLOCK TRAY: Brand: MEDLINE INDUSTRIES, INC.

## (undated) DEVICE — SINGLE USE ASPIRATION NEEDLE: Brand: SINGLE USE ASPIRATION NEEDLE

## (undated) DEVICE — TUBING, SUCTION, 3/16" X 6', STRAIGHT: Brand: MEDLINE

## (undated) DEVICE — DRAIN SURG SGL COLL PT TB FOR ATS BG OASIS

## (undated) DEVICE — KIT CATH PLEUR CHLORAPREP FEN DRP FLTR STRW FOAM CATH PD

## (undated) DEVICE — O.R. CABLE 1X16, 61CM AND EXTENSION

## (undated) DEVICE — SYRINGE MED 50ML LUERSLIP TIP

## (undated) DEVICE — ENHANCED STEERING KIT WITH STYLET CAP 50CM

## (undated) DEVICE — ADHESIVE SKIN CLSR 0.7ML TOP DERMBND ADV

## (undated) DEVICE — AIRLIFE™ ADULT AEROSOL MASK VINYL, UNDER-THE-CHIN STYLE: Brand: AIRLIFE™

## (undated) DEVICE — SHEET,DRAPE,40X58,STERILE: Brand: MEDLINE

## (undated) DEVICE — ALCOHOL RUBBING 16OZ 70% ISO

## (undated) DEVICE — FRAME EYEWR PROTCT ASST CLR DISP SAFEVIEW

## (undated) DEVICE — DISPOSABLE BIOPSY FORCEPS: Brand: DISPOSABLE BIOPSY FORCEPS

## (undated) DEVICE — TRAP,MUCUS SPECIMEN, 80CC: Brand: MEDLINE

## (undated) DEVICE — STERILE LATEX POWDER-FREE SURGICAL GLOVESWITH NITRILE COATING: Brand: PROTEXIS

## (undated) DEVICE — CONMED SCOPE SAVER BITE BLOCK, 20X27 MM: Brand: SCOPE SAVER

## (undated) DEVICE — NEBULIZER AEROSOL TBNG 7 FT FOR ACUTE CARE NEBUTECH

## (undated) DEVICE — TUBING, SUCTION, 3/16" X 12', STRAIGHT: Brand: MEDLINE

## (undated) DEVICE — YANKAUER,BULB TIP,W/O VENT,RIGID,STERILE: Brand: MEDLINE

## (undated) DEVICE — GOWN SIRUS NONREIN XL W/TWL: Brand: MEDLINE INDUSTRIES, INC.

## (undated) DEVICE — LINER,SOFT,SUCTION CANISTER,1500CC: Brand: MEDLINE

## (undated) DEVICE — TUBING, SUCTION, 3/16" X 10', STRAIGHT: Brand: MEDLINE

## (undated) DEVICE — AVANOS* UNIVERSAL BLOCK TRAYS: Brand: AVANOS